# Patient Record
Sex: FEMALE | Race: WHITE | Employment: OTHER
[De-identification: names, ages, dates, MRNs, and addresses within clinical notes are randomized per-mention and may not be internally consistent; named-entity substitution may affect disease eponyms.]

---

## 2017-01-05 ENCOUNTER — OFFICE VISIT (OUTPATIENT)
Dept: ORTHOPEDIC SURGERY | Facility: CLINIC | Age: 62
End: 2017-01-05

## 2017-01-05 DIAGNOSIS — S82.142D TIBIAL PLATEAU FRACTURE, LEFT, CLOSED, WITH ROUTINE HEALING, SUBSEQUENT ENCOUNTER: Primary | ICD-10-CM

## 2017-01-05 PROCEDURE — 99024 POSTOP FOLLOW-UP VISIT: CPT | Performed by: ORTHOPAEDIC SURGERY

## 2017-01-11 ENCOUNTER — CARE COORDINATION (OUTPATIENT)
Dept: INTERNAL MEDICINE | Facility: CLINIC | Age: 62
End: 2017-01-11

## 2017-01-16 ENCOUNTER — CARE COORDINATION (OUTPATIENT)
Dept: CARE COORDINATION | Facility: CLINIC | Age: 62
End: 2017-01-16

## 2017-01-16 RX ORDER — TRAMADOL HYDROCHLORIDE 50 MG/1
50 TABLET ORAL EVERY 6 HOURS PRN
COMMUNITY
End: 2017-04-22 | Stop reason: SDUPTHER

## 2017-01-16 RX ORDER — CHOLECALCIFEROL (VITAMIN D3) 1250 MCG
1 CAPSULE ORAL WEEKLY
COMMUNITY
End: 2017-08-21 | Stop reason: ALTCHOICE

## 2017-01-16 RX ORDER — SENNA PLUS 8.6 MG/1
2 TABLET ORAL DAILY
COMMUNITY
End: 2018-01-19 | Stop reason: ALTCHOICE

## 2017-01-23 ENCOUNTER — OFFICE VISIT (OUTPATIENT)
Dept: INTERNAL MEDICINE | Facility: CLINIC | Age: 62
End: 2017-01-23

## 2017-01-23 VITALS
WEIGHT: 164 LBS | HEIGHT: 61 IN | DIASTOLIC BLOOD PRESSURE: 68 MMHG | RESPIRATION RATE: 14 BRPM | BODY MASS INDEX: 30.96 KG/M2 | SYSTOLIC BLOOD PRESSURE: 132 MMHG

## 2017-01-23 DIAGNOSIS — S50.02XA LEFT ELBOW CONTUSION: ICD-10-CM

## 2017-01-23 DIAGNOSIS — S82.142A TIBIAL PLATEAU FRACTURE, LEFT, CLOSED, INITIAL ENCOUNTER: Primary | ICD-10-CM

## 2017-01-23 DIAGNOSIS — Z12.9 CANCER SCREENING: ICD-10-CM

## 2017-01-23 DIAGNOSIS — J43.1 PANLOBULAR EMPHYSEMA (HCC): ICD-10-CM

## 2017-01-23 DIAGNOSIS — F31.9 BIPOLAR DISORDER WITHOUT PSYCHOTIC FEATURES (HCC): ICD-10-CM

## 2017-01-23 PROCEDURE — G8419 CALC BMI OUT NRM PARAM NOF/U: HCPCS | Performed by: INTERNAL MEDICINE

## 2017-01-23 PROCEDURE — G8926 SPIRO NO PERF OR DOC: HCPCS | Performed by: INTERNAL MEDICINE

## 2017-01-23 PROCEDURE — 3014F SCREEN MAMMO DOC REV: CPT | Performed by: INTERNAL MEDICINE

## 2017-01-23 PROCEDURE — 3017F COLORECTAL CA SCREEN DOC REV: CPT | Performed by: INTERNAL MEDICINE

## 2017-01-23 PROCEDURE — 99213 OFFICE O/P EST LOW 20 MIN: CPT | Performed by: INTERNAL MEDICINE

## 2017-01-23 PROCEDURE — 4004F PT TOBACCO SCREEN RCVD TLK: CPT | Performed by: INTERNAL MEDICINE

## 2017-01-23 PROCEDURE — G8484 FLU IMMUNIZE NO ADMIN: HCPCS | Performed by: INTERNAL MEDICINE

## 2017-01-23 PROCEDURE — 3023F SPIROM DOC REV: CPT | Performed by: INTERNAL MEDICINE

## 2017-01-23 PROCEDURE — G8427 DOCREV CUR MEDS BY ELIG CLIN: HCPCS | Performed by: INTERNAL MEDICINE

## 2017-01-23 RX ORDER — SIMVASTATIN 40 MG
TABLET ORAL
Qty: 90 TABLET | Refills: 3 | Status: SHIPPED | OUTPATIENT
Start: 2017-01-23 | End: 2018-03-07 | Stop reason: SDUPTHER

## 2017-01-23 RX ORDER — DOCUSATE SODIUM 100 MG/1
100 CAPSULE, LIQUID FILLED ORAL 2 TIMES DAILY
Qty: 60 CAPSULE | Refills: 3 | Status: SHIPPED | OUTPATIENT
Start: 2017-01-23 | End: 2017-08-21

## 2017-01-23 RX ORDER — ESCITALOPRAM OXALATE 10 MG/1
10 TABLET ORAL DAILY
Qty: 30 TABLET | Refills: 3 | Status: ON HOLD | OUTPATIENT
Start: 2017-01-23 | End: 2019-01-01 | Stop reason: HOSPADM

## 2017-01-23 RX ORDER — TRAMADOL HYDROCHLORIDE 50 MG/1
50 TABLET ORAL EVERY 6 HOURS PRN
Qty: 30 TABLET | Refills: 0 | Status: SHIPPED | OUTPATIENT
Start: 2017-01-23 | End: 2017-01-30

## 2017-01-23 RX ORDER — ESCITALOPRAM OXALATE 20 MG/1
20 TABLET ORAL DAILY
Qty: 30 TABLET | Refills: 3 | Status: ON HOLD | OUTPATIENT
Start: 2017-01-23 | End: 2019-01-01 | Stop reason: HOSPADM

## 2017-01-23 ASSESSMENT — ENCOUNTER SYMPTOMS
ABDOMINAL PAIN: 0
ALLERGIC/IMMUNOLOGIC NEGATIVE: 1
EYES NEGATIVE: 1
RESPIRATORY NEGATIVE: 1

## 2017-02-02 ENCOUNTER — OFFICE VISIT (OUTPATIENT)
Dept: ORTHOPEDIC SURGERY | Facility: CLINIC | Age: 62
End: 2017-02-02

## 2017-02-02 VITALS — HEIGHT: 61 IN | BODY MASS INDEX: 31.34 KG/M2 | WEIGHT: 166 LBS

## 2017-02-02 DIAGNOSIS — S82.142D TIBIAL PLATEAU FRACTURE, LEFT, CLOSED, WITH ROUTINE HEALING, SUBSEQUENT ENCOUNTER: Primary | ICD-10-CM

## 2017-02-02 PROCEDURE — 99024 POSTOP FOLLOW-UP VISIT: CPT | Performed by: ORTHOPAEDIC SURGERY

## 2017-04-03 ENCOUNTER — CARE COORDINATION (OUTPATIENT)
Dept: CARE COORDINATION | Age: 62
End: 2017-04-03

## 2017-04-03 DIAGNOSIS — E55.9 VITAMIN D DEFICIENCY: Primary | ICD-10-CM

## 2017-04-04 RX ORDER — CHOLECALCIFEROL (VITAMIN D3) 1250 MCG
1 CAPSULE ORAL WEEKLY
Qty: 1 CAPSULE | Status: CANCELLED | OUTPATIENT
Start: 2017-04-04

## 2017-04-04 RX ORDER — B-COMPLEX WITH VITAMIN C
1 TABLET ORAL DAILY
Qty: 90 TABLET | Refills: 3 | Status: SHIPPED | OUTPATIENT
Start: 2017-04-04 | End: 2018-01-19 | Stop reason: ALTCHOICE

## 2017-04-24 RX ORDER — TRAMADOL HYDROCHLORIDE 50 MG/1
TABLET ORAL
Qty: 30 TABLET | Refills: 0 | Status: SHIPPED | OUTPATIENT
Start: 2017-04-24 | End: 2017-06-19 | Stop reason: SDUPTHER

## 2017-05-19 RX ORDER — NICOTINE 21 MG/24HR
1 PATCH, TRANSDERMAL 24 HOURS TRANSDERMAL EVERY 24 HOURS
Qty: 30 PATCH | Refills: 0 | Status: SHIPPED | OUTPATIENT
Start: 2017-05-19 | End: 2017-08-01 | Stop reason: SDUPTHER

## 2017-06-05 RX ORDER — MIDODRINE HYDROCHLORIDE 10 MG/1
TABLET ORAL
Qty: 180 TABLET | Refills: 0 | Status: SHIPPED | OUTPATIENT
Start: 2017-06-05 | End: 2017-09-08 | Stop reason: SDUPTHER

## 2017-06-15 ENCOUNTER — CARE COORDINATION (OUTPATIENT)
Dept: CARE COORDINATION | Age: 62
End: 2017-06-15

## 2017-06-15 RX ORDER — LORAZEPAM 1 MG/1
0.5 TABLET ORAL 2 TIMES DAILY PRN
Refills: 1 | COMMUNITY
Start: 2017-04-22 | End: 2019-01-01 | Stop reason: ALTCHOICE

## 2017-06-19 ENCOUNTER — OFFICE VISIT (OUTPATIENT)
Dept: INTERNAL MEDICINE CLINIC | Age: 62
End: 2017-06-19
Payer: COMMERCIAL

## 2017-06-19 VITALS
HEIGHT: 61 IN | DIASTOLIC BLOOD PRESSURE: 68 MMHG | BODY MASS INDEX: 33.61 KG/M2 | RESPIRATION RATE: 14 BRPM | SYSTOLIC BLOOD PRESSURE: 110 MMHG | WEIGHT: 178 LBS

## 2017-06-19 DIAGNOSIS — M26.609 TEMPOROMANDIBULAR JOINT DISORDER: ICD-10-CM

## 2017-06-19 DIAGNOSIS — F09 MILD COGNITIVE DISORDER: ICD-10-CM

## 2017-06-19 DIAGNOSIS — F31.70 BIPOLAR DISORDER IN FULL REMISSION, MOST RECENT EPISODE UNSPECIFIED TYPE (HCC): ICD-10-CM

## 2017-06-19 DIAGNOSIS — I95.0 IDIOPATHIC HYPOTENSION: Primary | Chronic | ICD-10-CM

## 2017-06-19 PROCEDURE — 3014F SCREEN MAMMO DOC REV: CPT | Performed by: INTERNAL MEDICINE

## 2017-06-19 PROCEDURE — 4004F PT TOBACCO SCREEN RCVD TLK: CPT | Performed by: INTERNAL MEDICINE

## 2017-06-19 PROCEDURE — G8427 DOCREV CUR MEDS BY ELIG CLIN: HCPCS | Performed by: INTERNAL MEDICINE

## 2017-06-19 PROCEDURE — G8419 CALC BMI OUT NRM PARAM NOF/U: HCPCS | Performed by: INTERNAL MEDICINE

## 2017-06-19 PROCEDURE — 99214 OFFICE O/P EST MOD 30 MIN: CPT | Performed by: INTERNAL MEDICINE

## 2017-06-19 PROCEDURE — 3017F COLORECTAL CA SCREEN DOC REV: CPT | Performed by: INTERNAL MEDICINE

## 2017-06-19 RX ORDER — TRAMADOL HYDROCHLORIDE 50 MG/1
TABLET ORAL
Qty: 30 TABLET | Refills: 0 | Status: SHIPPED | OUTPATIENT
Start: 2017-06-19 | End: 2017-07-18 | Stop reason: SDUPTHER

## 2017-06-19 ASSESSMENT — PATIENT HEALTH QUESTIONNAIRE - PHQ9
1. LITTLE INTEREST OR PLEASURE IN DOING THINGS: 1
SUM OF ALL RESPONSES TO PHQ QUESTIONS 1-9: 2
2. FEELING DOWN, DEPRESSED OR HOPELESS: 1
SUM OF ALL RESPONSES TO PHQ9 QUESTIONS 1 & 2: 2

## 2017-06-19 ASSESSMENT — ENCOUNTER SYMPTOMS
ABDOMINAL PAIN: 0
DIFFICULTY BREATHING: 1
NAUSEA: 0

## 2017-06-19 ASSESSMENT — COPD QUESTIONNAIRES: COPD: 1

## 2017-07-19 RX ORDER — TRAMADOL HYDROCHLORIDE 50 MG/1
TABLET ORAL
Qty: 30 TABLET | Refills: 0 | Status: SHIPPED | OUTPATIENT
Start: 2017-07-19 | End: 2017-09-19 | Stop reason: SDUPTHER

## 2017-08-21 ENCOUNTER — CARE COORDINATION (OUTPATIENT)
Dept: CARE COORDINATION | Age: 62
End: 2017-08-21

## 2017-08-25 ENCOUNTER — OFFICE VISIT (OUTPATIENT)
Dept: PSYCHOLOGY | Age: 62
End: 2017-08-25
Payer: COMMERCIAL

## 2017-08-25 DIAGNOSIS — F33.1 DEPRESSION, MAJOR, RECURRENT, MODERATE (HCC): ICD-10-CM

## 2017-08-25 DIAGNOSIS — F17.219 CIGARETTE NICOTINE DEPENDENCE WITH NICOTINE-INDUCED DISORDER: Primary | ICD-10-CM

## 2017-08-25 PROCEDURE — 90791 PSYCH DIAGNOSTIC EVALUATION: CPT | Performed by: SOCIAL WORKER

## 2017-08-25 ASSESSMENT — PATIENT HEALTH QUESTIONNAIRE - PHQ9
9. THOUGHTS THAT YOU WOULD BE BETTER OFF DEAD, OR OF HURTING YOURSELF: 0
4. FEELING TIRED OR HAVING LITTLE ENERGY: 3
1. LITTLE INTEREST OR PLEASURE IN DOING THINGS: 0
10. IF YOU CHECKED OFF ANY PROBLEMS, HOW DIFFICULT HAVE THESE PROBLEMS MADE IT FOR YOU TO DO YOUR WORK, TAKE CARE OF THINGS AT HOME, OR GET ALONG WITH OTHER PEOPLE: 0
2. FEELING DOWN, DEPRESSED OR HOPELESS: 3
3. TROUBLE FALLING OR STAYING ASLEEP: 0
8. MOVING OR SPEAKING SO SLOWLY THAT OTHER PEOPLE COULD HAVE NOTICED. OR THE OPPOSITE, BEING SO FIGETY OR RESTLESS THAT YOU HAVE BEEN MOVING AROUND A LOT MORE THAN USUAL: 2
7. TROUBLE CONCENTRATING ON THINGS, SUCH AS READING THE NEWSPAPER OR WATCHING TELEVISION: 3
5. POOR APPETITE OR OVEREATING: 3
SUM OF ALL RESPONSES TO PHQ QUESTIONS 1-9: 14
SUM OF ALL RESPONSES TO PHQ9 QUESTIONS 1 & 2: 3
6. FEELING BAD ABOUT YOURSELF - OR THAT YOU ARE A FAILURE OR HAVE LET YOURSELF OR YOUR FAMILY DOWN: 0

## 2017-09-08 RX ORDER — MIDODRINE HYDROCHLORIDE 10 MG/1
TABLET ORAL
Qty: 180 TABLET | Refills: 2 | Status: SHIPPED | OUTPATIENT
Start: 2017-09-08 | End: 2018-06-05 | Stop reason: SDUPTHER

## 2017-09-19 ENCOUNTER — OFFICE VISIT (OUTPATIENT)
Dept: INTERNAL MEDICINE CLINIC | Age: 62
End: 2017-09-19
Payer: COMMERCIAL

## 2017-09-19 VITALS
DIASTOLIC BLOOD PRESSURE: 60 MMHG | SYSTOLIC BLOOD PRESSURE: 112 MMHG | HEART RATE: 90 BPM | WEIGHT: 177 LBS | HEIGHT: 61 IN | BODY MASS INDEX: 33.42 KG/M2 | OXYGEN SATURATION: 96 %

## 2017-09-19 DIAGNOSIS — J43.1 PANLOBULAR EMPHYSEMA (HCC): ICD-10-CM

## 2017-09-19 DIAGNOSIS — M25.562 CHRONIC PAIN OF LEFT KNEE: ICD-10-CM

## 2017-09-19 DIAGNOSIS — Z23 NEED FOR VACCINATION: Primary | ICD-10-CM

## 2017-09-19 DIAGNOSIS — F17.200 SMOKER: ICD-10-CM

## 2017-09-19 DIAGNOSIS — G89.29 CHRONIC PAIN OF LEFT KNEE: ICD-10-CM

## 2017-09-19 DIAGNOSIS — Z00.00 ROUTINE HEALTH MAINTENANCE: ICD-10-CM

## 2017-09-19 PROCEDURE — 99214 OFFICE O/P EST MOD 30 MIN: CPT | Performed by: INTERNAL MEDICINE

## 2017-09-19 PROCEDURE — 90686 IIV4 VACC NO PRSV 0.5 ML IM: CPT | Performed by: INTERNAL MEDICINE

## 2017-09-19 PROCEDURE — G0008 ADMIN INFLUENZA VIRUS VAC: HCPCS | Performed by: INTERNAL MEDICINE

## 2017-09-19 RX ORDER — TRAMADOL HYDROCHLORIDE 50 MG/1
TABLET ORAL
Qty: 30 TABLET | Refills: 0 | Status: SHIPPED | OUTPATIENT
Start: 2017-09-19 | End: 2018-01-19 | Stop reason: SDUPTHER

## 2017-09-19 ASSESSMENT — ENCOUNTER SYMPTOMS
SHORTNESS OF BREATH: 1
EYE ITCHING: 0
WHEEZING: 1
COUGH: 0
COLOR CHANGE: 0
CONSTIPATION: 0
ABDOMINAL PAIN: 0
EYE REDNESS: 0
ABDOMINAL DISTENTION: 0
CHEST TIGHTNESS: 0
CHOKING: 0
BACK PAIN: 0
DIARRHEA: 0
APNEA: 0
BLOOD IN STOOL: 0
EYE DISCHARGE: 0
EYE PAIN: 0

## 2017-09-29 ENCOUNTER — CARE COORDINATION (OUTPATIENT)
Dept: CARE COORDINATION | Age: 62
End: 2017-09-29

## 2017-10-08 ENCOUNTER — HOSPITAL ENCOUNTER (EMERGENCY)
Age: 62
Discharge: HOME | End: 2017-10-08
Attending: EMERGENCY MEDICINE
Payer: COMMERCIAL

## 2017-10-08 VITALS
WEIGHT: 180 LBS | RESPIRATION RATE: 17 BRPM | OXYGEN SATURATION: 95 % | HEART RATE: 106 BPM | HEIGHT: 61 IN | BODY MASS INDEX: 33.99 KG/M2 | TEMPERATURE: 98 F | DIASTOLIC BLOOD PRESSURE: 56 MMHG | SYSTOLIC BLOOD PRESSURE: 85 MMHG

## 2017-10-08 DIAGNOSIS — B02.9 HERPES ZOSTER WITHOUT COMPLICATION: Primary | ICD-10-CM

## 2017-10-08 PROCEDURE — 6370000000 HC RX 637 (ALT 250 FOR IP): Performed by: EMERGENCY MEDICINE

## 2017-10-08 PROCEDURE — 99282 EMERGENCY DEPT VISIT SF MDM: CPT

## 2017-10-08 RX ORDER — HYDROCODONE BITARTRATE AND ACETAMINOPHEN 5; 325 MG/1; MG/1
1 TABLET ORAL EVERY 8 HOURS PRN
Qty: 12 TABLET | Refills: 0 | Status: SHIPPED | OUTPATIENT
Start: 2017-10-08 | End: 2017-10-11 | Stop reason: SDUPTHER

## 2017-10-08 RX ORDER — TETRACAINE HYDROCHLORIDE 5 MG/ML
1 SOLUTION OPHTHALMIC ONCE
Status: COMPLETED | OUTPATIENT
Start: 2017-10-08 | End: 2017-10-08

## 2017-10-08 RX ORDER — HYDROCODONE BITARTRATE AND ACETAMINOPHEN 5; 325 MG/1; MG/1
1 TABLET ORAL ONCE
Status: COMPLETED | OUTPATIENT
Start: 2017-10-08 | End: 2017-10-08

## 2017-10-08 RX ORDER — HYDROCODONE BITARTRATE AND ACETAMINOPHEN 5; 325 MG/1; MG/1
1 TABLET ORAL EVERY 8 HOURS PRN
Qty: 12 TABLET | Refills: 0 | Status: SHIPPED | OUTPATIENT
Start: 2017-10-08 | End: 2017-10-08

## 2017-10-08 RX ORDER — VALACYCLOVIR HYDROCHLORIDE 500 MG/1
1000 TABLET, FILM COATED ORAL 3 TIMES DAILY
Qty: 21 TABLET | Refills: 0 | Status: SHIPPED | OUTPATIENT
Start: 2017-10-08 | End: 2017-10-15

## 2017-10-08 RX ORDER — VALACYCLOVIR HYDROCHLORIDE 500 MG/1
1000 TABLET, FILM COATED ORAL ONCE
Status: COMPLETED | OUTPATIENT
Start: 2017-10-08 | End: 2017-10-08

## 2017-10-08 RX ORDER — IBUPROFEN 600 MG/1
600 TABLET ORAL EVERY 8 HOURS PRN
Qty: 30 TABLET | Refills: 0 | Status: SHIPPED | OUTPATIENT
Start: 2017-10-08 | End: 2019-01-01

## 2017-10-08 RX ADMIN — HYDROCODONE BITARTRATE AND ACETAMINOPHEN 1 TABLET: 5; 325 TABLET ORAL at 18:10

## 2017-10-08 RX ADMIN — VALACYCLOVIR 1000 MG: 500 TABLET, FILM COATED ORAL at 18:11

## 2017-10-08 RX ADMIN — TETRACAINE HYDROCHLORIDE 1 DROP: 5 SOLUTION OPHTHALMIC at 19:12

## 2017-10-08 RX ADMIN — FLUORESCEIN SODIUM 1 MG: 1 STRIP OPHTHALMIC at 19:14

## 2017-10-08 ASSESSMENT — PAIN DESCRIPTION - ORIENTATION: ORIENTATION: RIGHT

## 2017-10-08 ASSESSMENT — PAIN SCALES - GENERAL
PAINLEVEL_OUTOF10: 9
PAINLEVEL_OUTOF10: 8

## 2017-10-08 ASSESSMENT — PAIN DESCRIPTION - LOCATION: LOCATION: EAR;EYE

## 2017-10-08 NOTE — ED PROVIDER NOTES
[address]  eMERGENCY dEPARTMENT eNCOUnter    Pt Name: Espinoza De León  MRN: [id number]  Birthdate 1955  Date of evaluation: 10/8/17  CHIEF COMPLAINT       Chief Complaint   Patient presents with    Rash     HISTORY OF PRESENT ILLNESS   HPI  41-year-old female past medical history as listed below presents to ER with right forehead, parietal and R ear pain. Patient states the pain started approximately 2 days ago prior to the rash. Rash is vesicular in appearance and very painful. Patient denies fevers, decreased hearing, changes in vision or any other complaints at this time  REVIEW OF SYSTEMS     Review of Systems   All other systems reviewed and are negative.     PAST MEDICAL HISTORY     Past Medical History:   Diagnosis Date    Atypical depressive disorder     Hypotension, unspecified     Mitral valve disorders     Obstructive chronic bronchitis with exacerbation (HCC)     Other and unspecified hyperlipidemia     Other malaise and fatigue     Temporomandibular joint disorders, unspecified     Unspecified breast disorder     Unspecified vitamin D deficiency      SURGICAL HISTORY       Past Surgical History:   Procedure Laterality Date    IRIS AND BSO  1995     CURRENT MEDICATIONS       Discharge Medication List as of 10/8/2017  6:04 PM      CONTINUE these medications which have NOT CHANGED    Details   NICOTINE STEP 1 21 MG/24HR UNWRAP AND APPLY 1 PATCH TO SKIN EVERY 24 HOURS, Disp-28 patch, R-3Normal      traMADol (ULTRAM) 50 MG tablet TAKE 1 TABLET BY MOUTH EVERY 6 HOURS AS NEEDED FOR PAIN, Disp-30 tablet, R-0Print      midodrine (PROAMATINE) 10 MG tablet TAKE 1 TABLET BY MOUTH TWICE DAILY, Disp-180 tablet, R-2Normal      LORazepam (ATIVAN) 1 MG tablet Take 1 mg by mouth daily as needed, R-1Historical Med      B Complex Vitamins (VITAMIN B COMPLEX) TABS Take 1 tablet by mouth daily, Disp-90 tablet, R-3Normal      !! escitalopram (LEXAPRO) 10 MG tablet Take 1 tablet by mouth daily Indications: (Pt takes a 20 mg + a 10 mg = 30 mg total per day), Disp-30 tablet, R-3      simvastatin (ZOCOR) 40 MG tablet TAKE 1 TABLET BY MOUTH EVERY NIGHT AT BEDTIME, Disp-90 tablet, R-3      !! escitalopram (LEXAPRO) 20 MG tablet Take 1 tablet by mouth daily Indications: (Pt takes a 20 mg + a 10 mg = 30 mg total per day), Disp-30 tablet, R-3      senna (SENOKOT) 8.6 MG tablet Take 2 tablets by mouth daily      QUEtiapine (SEROQUEL) 100 MG tablet Take 100-200 mg by mouth nightly      QUEtiapine (SEROQUEL XR) 300 MG extended release tablet Take 300 mg by mouth daily      budesonide-formoterol (SYMBICORT) 160-4.5 MCG/ACT AERO Inhale 2 puffs into the lungs 2 times daily, Disp-1 Inhaler, R-2      COMBIVENT RESPIMAT  MCG/ACT AERS inhaler INHALE 1 PUFF INTO THE LUNGS FOUR TIMES DAILY, Disp-4 g, R-5       !! - Potential duplicate medications found. Please discuss with provider. ALLERGIES     is allergic to vesicare [solifenacin]. FAMILY HISTORY     has no family status information on file. SOCIAL HISTORY      reports that she has been smoking Cigarettes. She has a 10.50 pack-year smoking history. She has never used smokeless tobacco. She reports that she does not drink alcohol or use drugs. PHYSICAL EXAM     INITIAL VITALS: BP (!) 85/56   Pulse 106   Temp 98 °F (36.7 °C)   Resp 17   Ht 5' 1\" (1.549 m)   Wt 180 lb (81.6 kg)   SpO2 95%   BMI 34.01 kg/m²    Physical Exam   Constitutional: She is oriented to person, place, and time. She appears well-developed and well-nourished. No distress. HENT:   Head: Normocephalic and atraumatic. Right Ear: External ear normal.   Nose: Nose normal.   Mouth/Throat: Oropharynx is clear and moist.   Vesicular rash noted over right forehead and right parietal area. No nasal or ocular involvement. Eyes: Conjunctivae and EOM are normal. Pupils are equal, round, and reactive to light. Neck: Normal range of motion. Neck supple.    Cardiovascular: Normal rate, regular fluorescein ophthalmic strip 1 mg    valACYclovir (VALTREX) tablet 1,000 mg    valACYclovir (VALTREX) 500 MG tablet     Sig: Take 2 tablets by mouth 3 times daily for 7 days     Dispense:  21 tablet     Refill:  0    DISCONTD: HYDROcodone-acetaminophen (NORCO) 5-325 MG per tablet     Sig: Take 1 tablet by mouth every 8 hours as needed for Pain . Dispense:  12 tablet     Refill:  0    ibuprofen (ADVIL;MOTRIN) 600 MG tablet     Sig: Take 1 tablet by mouth every 8 hours as needed for Pain     Dispense:  30 tablet     Refill:  0    HYDROcodone-acetaminophen (NORCO) 5-325 MG per tablet     Sig: Take 1 tablet by mouth every 8 hours as needed for Pain . Dispense:  12 tablet     Refill:  0     CONSULTS:  None    FINAL IMPRESSION      1. Herpes zoster without complication          DISPOSITION/PLAN   DISPOSITION Decision to Discharge    PATIENT REFERRED TO:  No follow-up provider specified.   DISCHARGE MEDICATIONS:  Discharge Medication List as of 10/8/2017  6:04 PM      START taking these medications    Details   valACYclovir (VALTREX) 500 MG tablet Take 2 tablets by mouth 3 times daily for 7 days, Disp-21 tablet, R-0Print      ibuprofen (ADVIL;MOTRIN) 600 MG tablet Take 1 tablet by mouth every 8 hours as needed for Pain, Disp-30 tablet, R-0Print      HYDROcodone-acetaminophen (NORCO) 5-325 MG per tablet Take 1 tablet by mouth every 8 hours as needed for Pain ., Disp-12 tablet, R-0Print           Yifan Osorio MD  Attending Emergency Physician                      Yifan Osorio MD  10/08/17 [id number]

## 2017-10-11 ENCOUNTER — OFFICE VISIT (OUTPATIENT)
Dept: INTERNAL MEDICINE CLINIC | Age: 62
End: 2017-10-11
Payer: COMMERCIAL

## 2017-10-11 ENCOUNTER — CARE COORDINATION (OUTPATIENT)
Dept: CARE COORDINATION | Age: 62
End: 2017-10-11

## 2017-10-11 VITALS
BODY MASS INDEX: 32.47 KG/M2 | HEART RATE: 111 BPM | DIASTOLIC BLOOD PRESSURE: 68 MMHG | OXYGEN SATURATION: 96 % | SYSTOLIC BLOOD PRESSURE: 110 MMHG | HEIGHT: 61 IN | WEIGHT: 172 LBS

## 2017-10-11 DIAGNOSIS — B02.9 HERPES ZOSTER WITHOUT COMPLICATION: ICD-10-CM

## 2017-10-11 DIAGNOSIS — Z13.220 SCREENING FOR HYPERLIPIDEMIA: ICD-10-CM

## 2017-10-11 DIAGNOSIS — Z12.11 SCREENING FOR COLON CANCER: ICD-10-CM

## 2017-10-11 PROCEDURE — 99213 OFFICE O/P EST LOW 20 MIN: CPT | Performed by: NURSE PRACTITIONER

## 2017-10-11 RX ORDER — PREDNISONE 20 MG/1
20 TABLET ORAL 2 TIMES DAILY
Qty: 10 TABLET | Refills: 0 | Status: SHIPPED | OUTPATIENT
Start: 2017-10-11 | End: 2017-10-16

## 2017-10-11 RX ORDER — HYDROCODONE BITARTRATE AND ACETAMINOPHEN 5; 325 MG/1; MG/1
1 TABLET ORAL EVERY 8 HOURS PRN
Qty: 12 TABLET | Refills: 0 | Status: SHIPPED | OUTPATIENT
Start: 2017-10-11 | End: 2017-10-15

## 2017-10-11 NOTE — PROGRESS NOTES
Visit Information    Have you changed or started any medications since your last visit including any over-the-counter medicines, vitamins, or herbal medicines? no   Are you having any side effects from any of your medications? -  no  Have you stopped taking any of your medications? Is so, why? -  no    Have you seen any other physician or provider since your last visit? No  Have you had any other diagnostic tests since your last visit? No  Have you been seen in the emergency room and/or had an admission to a hospital since we last saw you? No  Have you had your routine dental cleaning in the past 6 months? no    Have you activated your [employer] account? If not, what are your barriers? No: declined      Patient Care Team:  Pepe Bryan MD as PCP - General (Internal Medicine)  Luis Carlos Vazquez MD as PCP - S Attributed Provider  Erin Guardado RN as Care Coordinator  Yovany Dill MD as Consulting Physician (Orthopedic Surgery)  Svetlana Jacobson MD as Consulting Physician (Urology)    Medical History Review  Past Medical, Family, and Social History reviewed and does not contribute to the patient presenting condition    Health Maintenance   Topic Date Due    Hepatitis C screen  1955    HIV screen  09/02/1970    DTaP/Tdap/Td vaccine (1 - Tdap) 09/02/1974    Colon Cancer Screen FIT/FOBT  06/05/2013    Zostavax vaccine  09/02/2015    Lipid screen  05/17/2017    Diabetes screen  01/05/2019    Breast cancer screen  01/25/2019    Flu vaccine  Completed    Pneumococcal med risk  Completed       [address] [address]  Dept: [phone]  Dept Fax: [phone]    Annie Smith is a 58 y.o. female who presents today for her medical conditions/complaints as noted below.   Annie Smith is c/o of Rash (went to ER was Dx with shingles is still in a lot of pain,  )        HPI:     Patient presents with:  Rash: went to ER was Dx with shingles is still in a

## 2017-10-11 NOTE — CARE COORDINATION
RNCC met with patient her for f/u ED visit on 10/8/17 with shingles on her right forehead and eye. She was started on pain med, valtrex and ibuprofen. She states she is still having a lot of discomfort and eye pain. She was seen by CNP and started on prednisone and referral to eye dr today - asap d/t closeness of lesions to eye. Return for f/u in 2 weeks.      Future Appointments  Date Time Provider Caitlyn Rocio   10/25/2017 2:30 PM Karolina Oquendo NP [facility]   10/30/2017 1:00  [facility]  [facility]   1/19/2018 1:30 PM Semaj Escoto, RN  Ambulatory Care Coordinator

## 2017-10-25 ENCOUNTER — HOSPITAL ENCOUNTER (OUTPATIENT)
Age: 62
Setting detail: SPECIMEN
Discharge: HOME | End: 2017-10-25
Payer: COMMERCIAL

## 2017-10-25 ENCOUNTER — OFFICE VISIT (OUTPATIENT)
Dept: INTERNAL MEDICINE CLINIC | Age: 62
End: 2017-10-25
Payer: COMMERCIAL

## 2017-10-25 VITALS
WEIGHT: 171.96 LBS | DIASTOLIC BLOOD PRESSURE: 76 MMHG | BODY MASS INDEX: 32.47 KG/M2 | HEIGHT: 61 IN | SYSTOLIC BLOOD PRESSURE: 122 MMHG

## 2017-10-25 DIAGNOSIS — B02.9 HERPES ZOSTER WITHOUT COMPLICATION: Primary | ICD-10-CM

## 2017-10-25 DIAGNOSIS — R35.0 URINARY FREQUENCY: ICD-10-CM

## 2017-10-25 LAB
-: NORMAL
AMORPHOUS: NORMAL
BACTERIA: NORMAL
BILIRUBIN URINE: NEGATIVE
CASTS UA: NORMAL /LPF (ref 0–8)
COLOR: YELLOW
COMMENT UA: ABNORMAL
CRYSTALS, UA: NORMAL /HPF
EPITHELIAL CELLS UA: NORMAL /HPF (ref 0–5)
GLUCOSE URINE: NEGATIVE
KETONES, URINE: NEGATIVE
LEUKOCYTE ESTERASE, URINE: ABNORMAL
MUCUS: NORMAL
NITRITE, URINE: NEGATIVE
OTHER OBSERVATIONS UA: NORMAL
PH UA: 5 (ref 5–8)
PROTEIN UA: NEGATIVE
RBC UA: NORMAL /HPF (ref 0–4)
RENAL EPITHELIAL, UA: NORMAL /HPF
SPECIFIC GRAVITY UA: 1 (ref 1–1.03)
TRICHOMONAS: NORMAL
TURBIDITY: CLEAR
URINE HGB: NEGATIVE
UROBILINOGEN, URINE: NORMAL
WBC UA: NORMAL /HPF (ref 0–5)
YEAST: NORMAL

## 2017-10-25 PROCEDURE — 99213 OFFICE O/P EST LOW 20 MIN: CPT | Performed by: NURSE PRACTITIONER

## 2017-10-25 RX ORDER — TRAMADOL HYDROCHLORIDE 50 MG/1
TABLET ORAL
Qty: 30 TABLET | Refills: 0 | Status: CANCELLED | OUTPATIENT
Start: 2017-10-25

## 2017-10-25 ASSESSMENT — ENCOUNTER SYMPTOMS
COUGH: 0
ABDOMINAL PAIN: 0
SHORTNESS OF BREATH: 0
EYE DISCHARGE: 0
BLOOD IN STOOL: 0
RHINORRHEA: 0
SINUS PRESSURE: 0
WHEEZING: 0

## 2017-10-25 NOTE — PROGRESS NOTES
Take 1 mg by mouth daily as needed  1    B Complex Vitamins (VITAMIN B COMPLEX) TABS Take 1 tablet by mouth daily 90 tablet 3    escitalopram (LEXAPRO) 10 MG tablet Take 1 tablet by mouth daily Indications: (Pt takes a 20 mg + a 10 mg = 30 mg total per day) 30 tablet 3    simvastatin (ZOCOR) 40 MG tablet TAKE 1 TABLET BY MOUTH EVERY NIGHT AT BEDTIME 90 tablet 3    escitalopram (LEXAPRO) 20 MG tablet Take 1 tablet by mouth daily Indications: (Pt takes a 20 mg + a 10 mg = 30 mg total per day) 30 tablet 3    senna (SENOKOT) 8.6 MG tablet Take 2 tablets by mouth daily      QUEtiapine (SEROQUEL) 100 MG tablet Take 100-200 mg by mouth nightly      QUEtiapine (SEROQUEL XR) 300 MG extended release tablet Take 300 mg by mouth daily      budesonide-formoterol (SYMBICORT) 160-4.5 MCG/ACT AERO Inhale 2 puffs into the lungs 2 times daily 1 Inhaler 2    COMBIVENT RESPIMAT  MCG/ACT AERS inhaler INHALE 1 PUFF INTO THE LUNGS FOUR TIMES DAILY (Patient taking differently: INHALE 1 PUFF INTO THE LUNGS FOUR TIMES DAILY as needed) 4 g 5     No current facility-administered medications for this visit. Allergies   Allergen Reactions    Vesicare [Solifenacin] Nausea And Vomiting         Subjective:      Review of Systems   Constitutional: Negative for activity change, chills, fatigue and fever. Overall weakness    HENT: Negative for congestion, postnasal drip, rhinorrhea and sinus pressure. Eyes: Negative for discharge and visual disturbance. Respiratory: Negative for cough, shortness of breath and wheezing. Cardiovascular: Negative for chest pain and leg swelling. Gastrointestinal: Negative for abdominal pain and blood in stool. Endocrine: Negative for cold intolerance and heat intolerance. Genitourinary: Positive for decreased urine volume, dysuria, frequency and urgency. Negative for flank pain, hematuria and pelvic pain. Odor   Musculoskeletal: Negative for joint swelling and myalgias. involvemetn per optom    2. freq UA sent today  UA W/REFLEX CULTURE       Marlebronn Solders received counseling on Urinary frequency the following healthy behaviors: medication adherence  Reviewed prior labs and health maintenance  Continue current medications, diet and exercise. Discussed use, benefit, and side effects of prescribed medications. Barriers to medication compliance addressed. Patient given educational materials - see patient instructions  Was a self-tracking handout given in paper form or via [employer]? Yes    Requested Prescriptions      No prescriptions requested or ordered in this encounter       All patient questions answered. Patient voiced understanding. Quality Measures    Body mass index is 32.51 kg/m². Elevated. Weight control planned discussed Healthy diet and regular exercise. BP: 122/76 Blood pressure is normal. Treatment plan consists of No treatment change needed. Lab Results   Component Value Date    LDLCHOLESTEROL 78 05/17/2012    (goal LDL reduction with dx if diabetes is 50% LDL reduction)      PHQ Scores 8/25/2017 6/19/2017 8/1/2016 5/4/2016   PHQ2 Score 3 2 0 0   PHQ9 Score 14 2 0 0     Interpretation of Total Score Depression Severity: 1-4 = Minimal depression, 5-9 = Mild depression, 10-14 = Moderate depression, 15-19 = Moderately severe depression, 20-27 = Severe depression    Return if symptoms worsen or fail to improve. Orders Placed This Encounter   Procedures    UA W/REFLEX CULTURE     Standing Status:   Future     Standing Expiration Date:   10/26/2018     No orders of the defined types were placed in this encounter. Patient given verbal and/or written educational instructions. Follow up as directed. I have reviewed and agree with the nursing documentation.     Electronically signed by John Zee NP on 10/25/2017 at 2:39 PM

## 2017-10-26 LAB
CULTURE: NORMAL
CULTURE: NORMAL
Lab: NORMAL
SPECIMEN DESCRIPTION: NORMAL
STATUS: NORMAL

## 2017-10-30 ENCOUNTER — HOSPITAL ENCOUNTER (OUTPATIENT)
Age: 62
Discharge: HOME | End: 2017-10-30
Payer: COMMERCIAL

## 2017-10-30 ENCOUNTER — HOSPITAL ENCOUNTER (OUTPATIENT)
Dept: PULMONOLOGY | Age: 62
Discharge: HOME | End: 2017-10-30
Payer: COMMERCIAL

## 2017-10-30 DIAGNOSIS — Z00.00 ROUTINE HEALTH MAINTENANCE: ICD-10-CM

## 2017-10-30 DIAGNOSIS — J43.1 PANLOBULAR EMPHYSEMA (HCC): ICD-10-CM

## 2017-10-30 LAB
ANION GAP SERPL CALCULATED.3IONS-SCNC: 13 MMOL/L (ref 9–17)
BUN BLDV-MCNC: 9 MG/DL (ref 8–23)
BUN/CREAT BLD: ABNORMAL (ref 9–20)
CALCIUM SERPL-MCNC: 8.9 MG/DL (ref 8.6–10.4)
CHLORIDE BLD-SCNC: 102 MMOL/L (ref 98–107)
CO2: 24 MMOL/L (ref 20–31)
CREAT SERPL-MCNC: 0.79 MG/DL (ref 0.5–0.9)
GFR AFRICAN AMERICAN: >60 ML/MIN
GFR NON-AFRICAN AMERICAN: >60 ML/MIN
GFR SERPL CREATININE-BSD FRML MDRD: ABNORMAL ML/MIN/{1.73_M2}
GFR SERPL CREATININE-BSD FRML MDRD: ABNORMAL ML/MIN/{1.73_M2}
GLUCOSE BLD-MCNC: 109 MG/DL (ref 70–99)
HCT VFR BLD CALC: 38.9 % (ref 36–46)
HEMOGLOBIN: 12.8 G/DL (ref 12–16)
MCH RBC QN AUTO: 29.1 PG (ref 26–34)
MCHC RBC AUTO-ENTMCNC: 32.8 G/DL (ref 31–37)
MCV RBC AUTO: 88.8 FL (ref 80–100)
PDW BLD-RTO: 16.8 % (ref 11.5–14.9)
PLATELET # BLD: 306 K/UL (ref 150–450)
PMV BLD AUTO: 7.3 FL (ref 6–12)
POTASSIUM SERPL-SCNC: 4.1 MMOL/L (ref 3.7–5.3)
RBC # BLD: 4.38 M/UL (ref 4–5.2)
SODIUM BLD-SCNC: 139 MMOL/L (ref 135–144)
WBC # BLD: 12.1 K/UL (ref 3.5–11)

## 2017-10-30 PROCEDURE — 94729 DIFFUSING CAPACITY: CPT

## 2017-10-30 PROCEDURE — 85027 COMPLETE CBC AUTOMATED: CPT

## 2017-10-30 PROCEDURE — 94728 AIRWY RESIST BY OSCILLOMETRY: CPT

## 2017-10-30 PROCEDURE — 94727 GAS DIL/WSHOT DETER LNG VOL: CPT

## 2017-10-30 PROCEDURE — 94726 PLETHYSMOGRAPHY LUNG VOLUMES: CPT

## 2017-10-30 PROCEDURE — 83036 HEMOGLOBIN GLYCOSYLATED A1C: CPT

## 2017-10-30 PROCEDURE — 36415 COLL VENOUS BLD VENIPUNCTURE: CPT

## 2017-10-30 PROCEDURE — 80048 BASIC METABOLIC PNL TOTAL CA: CPT

## 2017-10-30 PROCEDURE — 94060 EVALUATION OF WHEEZING: CPT

## 2017-10-30 PROCEDURE — 6370000000 HC RX 637 (ALT 250 FOR IP): Performed by: INTERNAL MEDICINE

## 2017-10-30 RX ORDER — IPRATROPIUM BROMIDE AND ALBUTEROL SULFATE 2.5; .5 MG/3ML; MG/3ML
1 SOLUTION RESPIRATORY (INHALATION) ONCE
Status: COMPLETED | OUTPATIENT
Start: 2017-10-30 | End: 2017-10-30

## 2017-10-30 RX ADMIN — IPRATROPIUM BROMIDE AND ALBUTEROL SULFATE 1 AMPULE: .5; 3 SOLUTION RESPIRATORY (INHALATION) at 14:01

## 2017-10-31 LAB
ESTIMATED AVERAGE GLUCOSE: 114 MG/DL
HBA1C MFR BLD: 5.6 % (ref 4–6)

## 2017-12-07 ENCOUNTER — CARE COORDINATION (OUTPATIENT)
Dept: CARE COORDINATION | Age: 62
End: 2017-12-07

## 2017-12-07 NOTE — CARE COORDINATION
Ambulatory Care Coordination Note  12/7/2017  CM Risk Score: 5  Dimas Mortality Risk Score:      ACC: Yuli Dhillon RN    Summary Note: RNCC called and spoke to patient who states she is doing good. States her shingles all cleared up. She denied feeling any increase in sob, chest pain . She staes she has all her medications. Reminder for next appt with pcp and that we do have same day appointments. RNCC will reach out to patient in 2-3 weeks for follow up. Patient encouraged to contact [address] [address] for any questions or concerns regarding his health care, medications, or treatment plan, patient verbalized understanding. RNCC instructed patient that any concerns or issues will be addressed with patient's PCP and any new orders or instruction will be relayed to patient via [address] or [employer] once received. [address] contact information given to patient. Care Coordination Interventions    Program Enrollment:  Complex Care  Referral from Primary Care Provider:  Yes  Suggested Interventions and Community Resources  No Identified Needs  Behavorial Health:  Completed (Comment: [employer])  Zone Management Tools:  Completed (Comment: mailed COPD zone form.)  Other Services or Interventions:  referral to Behavioral Therapist-Ekta Brooks         Goals Addressed             Most Recent     Patient Stated (pt-stated)   On track (12/7/2017)             Patient will try to stop smoking. Barriers: lack of motivation and lack of support  Plan for overcoming my barriers: enc her sister to not smoke inside the house.  nicotine patches at pharm and use.   Confidence: 5/10  Anticipated Goal Completion Date: 11/30/17       Reduce Falls    On track (12/7/2017)             I will reduce my risk of falls by the following: Remove rugs or use non slip rugs  Install grab bars in bathroom  Use walking aids like cane or walker    Barriers: lack of motivation  Plan for overcoming my barriers: get someone to install grab bars in bathroom. Confidence: 6/10  Anticipated Goal Completion Date: 11/30/17            Prior to Admission medications    Medication Sig Start Date End Date Taking?  Authorizing Provider   ibuprofen (ADVIL;MOTRIN) 600 MG tablet Take 1 tablet by mouth every 8 hours as needed for Pain 10/8/17  Yes Ash Spencer MD   NICOTINE STEP 1 21 MG/24HR UNWRAP AND APPLY 1 PATCH TO SKIN EVERY 24 HOURS 10/6/17  Yes Andie Montejo MD   traMADol (ULTRAM) 50 MG tablet TAKE 1 TABLET BY MOUTH EVERY 6 HOURS AS NEEDED FOR PAIN 9/19/17  Yes Yuni Garcia MD   midodrine (PROAMATINE) 10 MG tablet TAKE 1 TABLET BY MOUTH TWICE DAILY 9/8/17  Yes Yuni Garcia MD   LORazepam (ATIVAN) 1 MG tablet Take 1 mg by mouth daily as needed 4/22/17  Yes Historical Provider, MD   B Complex Vitamins (VITAMIN B COMPLEX) TABS Take 1 tablet by mouth daily 4/4/17  Yes Andie Montejo MD   escitalopram (LEXAPRO) 10 MG tablet Take 1 tablet by mouth daily Indications: (Pt takes a 20 mg + a 10 mg = 30 mg total per day) 1/23/17  Yes Andie Montejo MD   simvastatin (ZOCOR) 40 MG tablet TAKE 1 TABLET BY MOUTH EVERY NIGHT AT BEDTIME 1/23/17  Yes Andie Montejo MD   escitalopram (LEXAPRO) 20 MG tablet Take 1 tablet by mouth daily Indications: (Pt takes a 20 mg + a 10 mg = 30 mg total per day) 1/23/17  Yes Andie Montejo MD   senna (SENOKOT) 8.6 MG tablet Take 2 tablets by mouth daily   Yes Historical Provider, MD   QUEtiapine (SEROQUEL) 100 MG tablet Take 100-200 mg by mouth nightly 8/23/16  Yes Historical Provider, MD   QUEtiapine (SEROQUEL XR) 300 MG extended release tablet Take 300 mg by mouth daily   Yes Historical Provider, MD   budesonide-formoterol (SYMBICORT) 160-4.5 MCG/ACT AERO Inhale 2 puffs into the lungs 2 times daily 9/20/16  Yes Andie Montejo MD   COMBIVENT RESPIMAT  MCG/ACT AERS inhaler INHALE 1 PUFF INTO THE LUNGS FOUR TIMES DAILY  Patient taking differently: INHALE 1 PUFF INTO THE LUNGS FOUR TIMES DAILY as needed 8/31/16  Yes

## 2018-01-01 ENCOUNTER — CARE COORDINATION (OUTPATIENT)
Dept: CARE COORDINATION | Age: 63
End: 2018-01-01

## 2018-01-01 ENCOUNTER — TELEPHONE (OUTPATIENT)
Dept: INTERNAL MEDICINE CLINIC | Age: 63
End: 2018-01-01

## 2018-01-01 ENCOUNTER — HOSPITAL ENCOUNTER (OUTPATIENT)
Dept: CT IMAGING | Age: 63
Discharge: HOME | End: 2018-10-31
Payer: COMMERCIAL

## 2018-01-01 DIAGNOSIS — J43.1 PANLOBULAR EMPHYSEMA (HCC): ICD-10-CM

## 2018-01-01 DIAGNOSIS — Z87.891 PERSONAL HISTORY OF NICOTINE DEPENDENCE: ICD-10-CM

## 2018-01-01 DIAGNOSIS — J32.9 SINUSITIS, UNSPECIFIED CHRONICITY, UNSPECIFIED LOCATION: Primary | ICD-10-CM

## 2018-01-01 PROCEDURE — G0297 LDCT FOR LUNG CA SCREEN: HCPCS

## 2018-01-01 RX ORDER — BUDESONIDE AND FORMOTEROL FUMARATE DIHYDRATE 160; 4.5 UG/1; UG/1
AEROSOL RESPIRATORY (INHALATION)
Qty: 10.2 G | Refills: 6 | Status: SHIPPED | OUTPATIENT
Start: 2018-01-01 | End: 2019-01-01 | Stop reason: SDUPTHER

## 2018-01-01 RX ORDER — AZITHROMYCIN 250 MG/1
TABLET, FILM COATED ORAL
Qty: 1 PACKET | Refills: 0 | Status: SHIPPED | OUTPATIENT
Start: 2018-01-01 | End: 2018-01-01

## 2018-01-05 ENCOUNTER — CARE COORDINATION (OUTPATIENT)
Dept: CARE COORDINATION | Age: 63
End: 2018-01-05

## 2018-01-05 ASSESSMENT — ENCOUNTER SYMPTOMS: DYSPNEA ASSOCIATED WITH: EXERTION

## 2018-01-05 NOTE — CARE COORDINATION
tablet Take 1 tablet by mouth daily Indications: (Pt takes a 20 mg + a 10 mg = 30 mg total per day) 1/23/17   Juan José Cabrera MD   simvastatin (ZOCOR) 40 MG tablet TAKE 1 TABLET BY MOUTH EVERY NIGHT AT BEDTIME 1/23/17   Juan José Cabrera MD   escitalopram (LEXAPRO) 20 MG tablet Take 1 tablet by mouth daily Indications: (Pt takes a 20 mg + a 10 mg = 30 mg total per day) 1/23/17   Juan José Cabrera MD   senna (SENOKOT) 8.6 MG tablet Take 2 tablets by mouth daily    Historical Provider, MD   QUEtiapine (SEROQUEL) 100 MG tablet Take 100-200 mg by mouth nightly 8/23/16   Historical Provider, MD   QUEtiapine (SEROQUEL XR) 300 MG extended release tablet Take 300 mg by mouth daily    Historical Provider, MD   budesonide-formoterol (SYMBICORT) 160-4.5 MCG/ACT AERO Inhale 2 puffs into the lungs 2 times daily 9/20/16   Juan José Cabrera MD   COMBIVENT RESPIMAT  MCG/ACT AERS inhaler INHALE 1 PUFF INTO THE LUNGS FOUR TIMES DAILY  Patient taking differently: INHALE 1 PUFF INTO THE LUNGS FOUR TIMES DAILY as needed 8/31/16   Juan José Cabrera MD       Future Appointments  Date Time Provider Caitlyn Chan   1/19/2018 1:30 PM Ervin Smith MD [address] [place] [address]

## 2018-01-08 RX ORDER — BUDESONIDE AND FORMOTEROL FUMARATE DIHYDRATE 160; 4.5 UG/1; UG/1
AEROSOL RESPIRATORY (INHALATION)
Qty: 10.2 G | Refills: 0 | Status: SHIPPED | OUTPATIENT
Start: 2018-01-08 | End: 2018-03-26 | Stop reason: SDUPTHER

## 2018-01-19 ENCOUNTER — OFFICE VISIT (OUTPATIENT)
Dept: INTERNAL MEDICINE CLINIC | Age: 63
End: 2018-01-19
Payer: COMMERCIAL

## 2018-01-19 VITALS
WEIGHT: 187 LBS | SYSTOLIC BLOOD PRESSURE: 118 MMHG | BODY MASS INDEX: 35.3 KG/M2 | DIASTOLIC BLOOD PRESSURE: 70 MMHG | OXYGEN SATURATION: 97 % | HEART RATE: 91 BPM | HEIGHT: 61 IN

## 2018-01-19 DIAGNOSIS — Z12.39 SCREENING FOR BREAST CANCER: ICD-10-CM

## 2018-01-19 DIAGNOSIS — F31.70 BIPOLAR DISORDER IN FULL REMISSION, MOST RECENT EPISODE UNSPECIFIED TYPE (HCC): Primary | ICD-10-CM

## 2018-01-19 DIAGNOSIS — M25.562 CHRONIC PAIN OF LEFT KNEE: ICD-10-CM

## 2018-01-19 DIAGNOSIS — J43.1 PANLOBULAR EMPHYSEMA (HCC): ICD-10-CM

## 2018-01-19 DIAGNOSIS — Z12.11 SCREENING FOR COLON CANCER: ICD-10-CM

## 2018-01-19 DIAGNOSIS — J43.9 PULMONARY EMPHYSEMA, UNSPECIFIED EMPHYSEMA TYPE (HCC): ICD-10-CM

## 2018-01-19 DIAGNOSIS — G89.29 CHRONIC PAIN OF LEFT KNEE: ICD-10-CM

## 2018-01-19 PROCEDURE — 99214 OFFICE O/P EST MOD 30 MIN: CPT | Performed by: INTERNAL MEDICINE

## 2018-01-19 RX ORDER — NEOMYCIN SULFATE, POLYMYXIN B SULFATE AND DEXAMETHASONE 3.5; 10000; 1 MG/ML; [USP'U]/ML; MG/ML
SUSPENSION/ DROPS OPHTHALMIC
Refills: 0 | COMMUNITY
Start: 2018-01-11 | End: 2018-04-24 | Stop reason: ALTCHOICE

## 2018-01-19 RX ORDER — QUETIAPINE 200 MG/1
TABLET, FILM COATED, EXTENDED RELEASE ORAL
Refills: 0 | COMMUNITY
Start: 2018-01-16 | End: 2018-07-30 | Stop reason: SDUPTHER

## 2018-01-19 RX ORDER — TRAMADOL HYDROCHLORIDE 50 MG/1
TABLET ORAL
Qty: 30 TABLET | Refills: 0 | Status: SHIPPED | OUTPATIENT
Start: 2018-01-19 | End: 2018-02-19

## 2018-01-19 ASSESSMENT — ENCOUNTER SYMPTOMS
DIARRHEA: 0
COLOR CHANGE: 0
CONSTIPATION: 0
SHORTNESS OF BREATH: 1
CHOKING: 0
BLOOD IN STOOL: 0
EYE PAIN: 0
EYE DISCHARGE: 0
BACK PAIN: 0
APNEA: 0
EYE ITCHING: 0
ABDOMINAL PAIN: 0
EYE REDNESS: 0
ABDOMINAL DISTENTION: 0
CHEST TIGHTNESS: 0
COUGH: 0

## 2018-01-29 ENCOUNTER — HOSPITAL ENCOUNTER (OUTPATIENT)
Dept: WOMENS IMAGING | Age: 63
Discharge: HOME | End: 2018-01-29
Payer: COMMERCIAL

## 2018-01-29 DIAGNOSIS — Z12.39 SCREENING FOR BREAST CANCER: ICD-10-CM

## 2018-01-29 PROCEDURE — 77063 BREAST TOMOSYNTHESIS BI: CPT

## 2018-02-02 ENCOUNTER — TELEPHONE (OUTPATIENT)
Dept: INTERNAL MEDICINE CLINIC | Age: 63
End: 2018-02-02

## 2018-02-02 NOTE — TELEPHONE ENCOUNTER
Called patient regarding overdue FIT kit, she states she messed it up.   She will have someone pickup another kit

## 2018-03-01 ENCOUNTER — CARE COORDINATION (OUTPATIENT)
Dept: CARE COORDINATION | Age: 63
End: 2018-03-01

## 2018-03-08 RX ORDER — SIMVASTATIN 40 MG
TABLET ORAL
Qty: 90 TABLET | Refills: 0 | Status: SHIPPED | OUTPATIENT
Start: 2018-03-08 | End: 2019-01-01

## 2018-03-26 ENCOUNTER — TELEPHONE (OUTPATIENT)
Dept: INTERNAL MEDICINE CLINIC | Age: 63
End: 2018-03-26

## 2018-03-26 DIAGNOSIS — N39.0 URINARY TRACT INFECTION WITHOUT HEMATURIA, SITE UNSPECIFIED: Primary | ICD-10-CM

## 2018-03-26 RX ORDER — BUDESONIDE AND FORMOTEROL FUMARATE DIHYDRATE 160; 4.5 UG/1; UG/1
AEROSOL RESPIRATORY (INHALATION)
Qty: 10.2 G | Refills: 0 | Status: SHIPPED | OUTPATIENT
Start: 2018-03-26 | End: 2018-04-24 | Stop reason: SDUPTHER

## 2018-03-26 RX ORDER — CIPROFLOXACIN 500 MG/1
500 TABLET, FILM COATED ORAL 2 TIMES DAILY
Qty: 10 TABLET | Refills: 0 | OUTPATIENT
Start: 2018-03-26 | End: 2018-04-02

## 2018-03-26 NOTE — TELEPHONE ENCOUNTER
Sick Call    Jarrett Bejarano   1955   [id number]   Keanu Schumacher MD   Allergies   Allergen Reactions   Paige Jurado [Solifenacin] Nausea And Vomiting        Last Visit: 1/2018  Reason for No Same Day Appt: none avail    Complaint:c/o bladder pressure, and states that she is not able to void much urine. Wants an antibiotic.       Ph[facility]: [facility]

## 2018-03-26 NOTE — TELEPHONE ENCOUNTER
Medication called into pt pharmacy. Called pt and informed her medication was sent to pharmacy. Pt understood.

## 2018-03-29 ENCOUNTER — CARE COORDINATION (OUTPATIENT)
Dept: CARE COORDINATION | Age: 63
End: 2018-03-29

## 2018-03-29 NOTE — CARE COORDINATION
Ambulatory Care Coordination Note  3/29/2018  CM Risk Score: 5  Dimas Mortality Risk Score:      ACC: Ryder Arreola RN    Summary Note: Patient stated that she feels that she has improved, she is still having some pressure with urination. She has been on CIPRO for 2 days and will call if her symptoms do not resolve. She stated that she is feeling better and has no concerns at this time. COPD Assessment    Does the patient understand envrionmental exposure?:  Yes  Is the patient able to verbalize Rescue vs. Long Acting medications?:  Yes  Does the patient have a nebulizer?:  No  Does the patient use a space with inhaled medications?:  Yes     No patient-reported symptoms         Symptoms:                   Care Coordination Interventions    Program Enrollment:  Rising Risk  Referral from Primary Care Provider:  Yes  Suggested Interventions and Community Resources  No Identified Needs  Behavorial Health:  Completed (Comment: [facility])  Smoking Cessation:  Completed  Zone Management Tools:  Completed (Comment: mailed COPD zone form.)  Other Services or Interventions:  referral to Behavioral Therapist-Ekta Brooks         Goals Addressed             Most Recent     Reduce Falls    No change (3/29/2018)             I will reduce my risk of falls by the following: Remove rugs or use non slip rugs  Install grab bars in bathroom  Use walking aids like cane or walker    Barriers: lack of motivation  Plan for overcoming my barriers: get someone to install grab bars in bathroom. Confidence: 6/10  Anticipated Goal Completion Date: 11/30/17            Prior to Admission medications    Medication Sig Start Date End Date Taking?  Authorizing Provider   SYMBICORT 160-4.5 MCG/ACT AERO INHALE 2 PUFFS INTO THE LUNGS TWICE DAILY 3/26/18   Zoe Cooper MD   ciprofloxacin (CIPRO) 500 MG tablet Take 1 tablet by mouth 2 times daily for 7 days 3/26/18 4/2/18  Beatrice Dillard MD   simvastatin (ZOCOR) 40 MG tablet TAKE 1 TABLET BY MOUTH EVERY NIGHT AT BEDTIME 3/8/18   Keanu Schumacher MD   neomycin-polymyxin-dexameth (MAXITROL) 3.5-40814-4.1 ophthalmic suspension INSTILL 1 DROP INTO THE RIGHT EYE QID 1/11/18   Historical Provider, MD   QUEtiapine (SEROQUEL XR) 200 MG extended release tablet TK 1 T PO QD 1/16/18   Historical Provider, MD   albuterol sulfate (PROAIR RESPICLICK) 080 (90 Base) MCG/ACT aerosol powder inhalation Inhale 1 puff into the lungs every 4 hours as needed for Wheezing or Shortness of Breath    Historical Provider, MD   ibuprofen (ADVIL;MOTRIN) 600 MG tablet Take 1 tablet by mouth every 8 hours as needed for Pain 10/8/17   Rosibel Gallegos MD   NICOTINE STEP 1 21 MG/24HR UNWRAP AND APPLY 1 PATCH TO SKIN EVERY 24 HOURS 10/6/17   Joe Hendrickson MD   midodrine (PROAMATINE) 10 MG tablet TAKE 1 TABLET BY MOUTH TWICE DAILY 9/8/17   Keanu Schumacher MD   LORazepam (ATIVAN) 1 MG tablet Take 1 mg by mouth daily as needed 4/22/17   Historical Provider, MD   escitalopram (LEXAPRO) 10 MG tablet Take 1 tablet by mouth daily Indications: (Pt takes a 20 mg + a 10 mg = 30 mg total per day) 1/23/17   Joe Hendrickson MD   escitalopram (LEXAPRO) 20 MG tablet Take 1 tablet by mouth daily Indications: (Pt takes a 20 mg + a 10 mg = 30 mg total per day) 1/23/17   Joe Hendrickson MD   QUEtiapine (SEROQUEL) 100 MG tablet Take 100-200 mg by mouth nightly 8/23/16   Historical Provider, MD   COMBIVENT RESPIMAT  MCG/ACT AERS inhaler INHALE 1 PUFF INTO THE LUNGS FOUR TIMES DAILY  Patient taking differently: INHALE 1 PUFF INTO THE LUNGS FOUR TIMES DAILY as needed 8/31/16   Joe Hendrickson MD       Future Appointments  Date Time Provider Caitlyn Chan   4/24/2018 2:00 PM [facility]

## 2018-04-24 ENCOUNTER — OFFICE VISIT (OUTPATIENT)
Dept: INTERNAL MEDICINE CLINIC | Age: 63
End: 2018-04-24
Payer: COMMERCIAL

## 2018-04-24 ENCOUNTER — CARE COORDINATION (OUTPATIENT)
Dept: CARE COORDINATION | Age: 63
End: 2018-04-24

## 2018-04-24 VITALS
HEART RATE: 81 BPM | OXYGEN SATURATION: 94 % | DIASTOLIC BLOOD PRESSURE: 78 MMHG | RESPIRATION RATE: 13 BRPM | WEIGHT: 186.8 LBS | HEIGHT: 60 IN | SYSTOLIC BLOOD PRESSURE: 132 MMHG | BODY MASS INDEX: 36.67 KG/M2

## 2018-04-24 DIAGNOSIS — Z72.0 TOBACCO ABUSE: ICD-10-CM

## 2018-04-24 DIAGNOSIS — F32.89 ATYPICAL DEPRESSIVE DISORDER: ICD-10-CM

## 2018-04-24 DIAGNOSIS — M54.50 MIDLINE LOW BACK PAIN WITHOUT SCIATICA, UNSPECIFIED CHRONICITY: ICD-10-CM

## 2018-04-24 DIAGNOSIS — J43.1 PANLOBULAR EMPHYSEMA (HCC): Primary | ICD-10-CM

## 2018-04-24 PROCEDURE — 99214 OFFICE O/P EST MOD 30 MIN: CPT | Performed by: INTERNAL MEDICINE

## 2018-04-24 RX ORDER — NICOTINE 21 MG/24HR
1 PATCH, TRANSDERMAL 24 HOURS TRANSDERMAL EVERY 24 HOURS
Qty: 30 PATCH | Refills: 3 | Status: SHIPPED | OUTPATIENT
Start: 2018-04-24 | End: 2019-01-01

## 2018-04-24 RX ORDER — TRAMADOL HYDROCHLORIDE 50 MG/1
50 TABLET ORAL EVERY 6 HOURS PRN
COMMUNITY
End: 2018-07-30

## 2018-04-24 RX ORDER — CEPHALEXIN 500 MG/1
500 CAPSULE ORAL DAILY
Refills: 0 | COMMUNITY
Start: 2018-04-05 | End: 2018-07-30 | Stop reason: ALTCHOICE

## 2018-04-24 RX ORDER — TRAMADOL HYDROCHLORIDE 50 MG/1
50 TABLET ORAL EVERY 6 HOURS PRN
Qty: 20 TABLET | Refills: 0 | Status: SHIPPED | OUTPATIENT
Start: 2018-04-24 | End: 2018-04-29

## 2018-04-24 RX ORDER — BUDESONIDE AND FORMOTEROL FUMARATE DIHYDRATE 160; 4.5 UG/1; UG/1
2 AEROSOL RESPIRATORY (INHALATION) 2 TIMES DAILY
Qty: 10.2 G | Refills: 0 | Status: SHIPPED | OUTPATIENT
Start: 2018-04-24 | End: 2018-09-07 | Stop reason: SDUPTHER

## 2018-04-24 RX ORDER — TRAMADOL HYDROCHLORIDE 50 MG/1
25 TABLET, FILM COATED ORAL EVERY 6 HOURS PRN
COMMUNITY
End: 2018-04-24 | Stop reason: ALTCHOICE

## 2018-04-24 ASSESSMENT — ENCOUNTER SYMPTOMS
EYE DISCHARGE: 0
SHORTNESS OF BREATH: 1
EYE PAIN: 0
COLOR CHANGE: 0
EYE REDNESS: 0
CHEST TIGHTNESS: 0
EYE ITCHING: 0
BLOOD IN STOOL: 0
ABDOMINAL DISTENTION: 0
DIARRHEA: 0
CONSTIPATION: 0
ABDOMINAL PAIN: 0
COUGH: 0
APNEA: 0
BACK PAIN: 1
CHOKING: 0

## 2018-05-21 ENCOUNTER — CARE COORDINATION (OUTPATIENT)
Dept: CARE COORDINATION | Age: 63
End: 2018-05-21

## 2018-05-31 DIAGNOSIS — J43.1 PANLOBULAR EMPHYSEMA (HCC): Primary | ICD-10-CM

## 2018-06-01 RX ORDER — ALBUTEROL SULFATE 90 UG/1
2 AEROSOL, METERED RESPIRATORY (INHALATION) EVERY 6 HOURS PRN
Qty: 1 INHALER | Refills: 3 | Status: SHIPPED | OUTPATIENT
Start: 2018-06-01

## 2018-06-05 RX ORDER — SIMVASTATIN 40 MG
TABLET ORAL
Qty: 90 TABLET | Refills: 0 | Status: SHIPPED | OUTPATIENT
Start: 2018-06-05 | End: 2018-07-30 | Stop reason: SDUPTHER

## 2018-06-05 RX ORDER — MIDODRINE HYDROCHLORIDE 10 MG/1
TABLET ORAL
Qty: 180 TABLET | Refills: 0 | Status: SHIPPED | OUTPATIENT
Start: 2018-06-05 | End: 2018-09-05 | Stop reason: SDUPTHER

## 2018-06-20 ENCOUNTER — CARE COORDINATION (OUTPATIENT)
Dept: CARE COORDINATION | Age: 63
End: 2018-06-20

## 2018-07-23 ENCOUNTER — CARE COORDINATION (OUTPATIENT)
Dept: CARE COORDINATION | Age: 63
End: 2018-07-23

## 2018-07-23 NOTE — CARE COORDINATION
Ambulatory Care Coordination Note  7/23/2018  CM Risk Score: 5  Dimas Mortality Risk Score:      ACC: Farida Rahman, RN    Summary Note: Spoke to patient for cc follow up. States, she's doing well. States, her breathing has been fine, denies shortness of breath. Using inhalers as directed. Denies falls. Continues to smoke, but states she has cut back. Encouragement and education provided r/t smoking cessation. Reminded of up coming PCP appointment. Denied any current concerns or needs. Encouraged to call PCP or CC for any needs or concerns. Plan; PCP appointment July 30th     COPD Assessment    Does the patient understand envrionmental exposure?:  Yes  Is the patient able to verbalize Rescue vs. Long Acting medications?:  Yes  Does the patient have a nebulizer?:  No  Does the patient use a space with inhaled medications?:  Yes     No patient-reported symptoms         Symptoms:                 Care Coordination Interventions    Program Enrollment:  Rising Risk  Referral from Primary Care Provider:  Yes  Suggested Interventions and Community Resources  No Identified Needs  Behavorial Health:  Completed (Comment: [employer] [facility])  Smoking Cessation:  Completed  Zone Management Tools:  Completed (Comment: mailed COPD zone form.)  Other Services or Interventions:  referral to Behavioral Therapist-Ekta Brooks         Goals Addressed             Most Recent     Patient Stated (pt-stated)   No change (7/23/2018)             Patient will try to stop smoking. Barriers: lack of motivation and lack of support  Plan for overcoming my barriers: enc her sister to not smoke inside the house.  nicotine patches at pharm and use.   Confidence: 5/10  Anticipated Goal Completion Date: 11/30/17       Reduce Falls    On track (7/23/2018)             I will reduce my risk of falls by the following: Remove rugs or use non slip rugs  Install grab bars in bathroom  Use walking aids like cane or walker    Barriers: lack

## 2018-07-30 ENCOUNTER — OFFICE VISIT (OUTPATIENT)
Dept: INTERNAL MEDICINE CLINIC | Age: 63
End: 2018-07-30
Payer: COMMERCIAL

## 2018-07-30 VITALS
HEIGHT: 61 IN | RESPIRATION RATE: 20 BRPM | BODY MASS INDEX: 35.91 KG/M2 | WEIGHT: 190.2 LBS | DIASTOLIC BLOOD PRESSURE: 60 MMHG | SYSTOLIC BLOOD PRESSURE: 110 MMHG | HEART RATE: 82 BPM

## 2018-07-30 DIAGNOSIS — G47.33 OSA (OBSTRUCTIVE SLEEP APNEA): ICD-10-CM

## 2018-07-30 DIAGNOSIS — Z00.00 HEALTH CARE MAINTENANCE: ICD-10-CM

## 2018-07-30 DIAGNOSIS — J43.1 PANLOBULAR EMPHYSEMA (HCC): Primary | ICD-10-CM

## 2018-07-30 DIAGNOSIS — F32.89 ATYPICAL DEPRESSIVE DISORDER: ICD-10-CM

## 2018-07-30 DIAGNOSIS — R53.83 FATIGUE, UNSPECIFIED TYPE: ICD-10-CM

## 2018-07-30 PROCEDURE — 99214 OFFICE O/P EST MOD 30 MIN: CPT | Performed by: INTERNAL MEDICINE

## 2018-07-30 ASSESSMENT — ENCOUNTER SYMPTOMS
DIARRHEA: 0
EYE ITCHING: 0
BLOOD IN STOOL: 0
ABDOMINAL DISTENTION: 0
CONSTIPATION: 0
COUGH: 0
APNEA: 0
ABDOMINAL PAIN: 0
EYE REDNESS: 0
CHOKING: 0
BACK PAIN: 0
COLOR CHANGE: 0
SHORTNESS OF BREATH: 0
CHEST TIGHTNESS: 0
EYE DISCHARGE: 0
EYE PAIN: 0

## 2018-07-30 NOTE — PROGRESS NOTES
Subjective:      Patient ID: Marva Acosta is a 58 y.o. female. Chronic Disease Visit Information    BP Readings from Last 3 Encounters:   04/24/18 132/78   01/19/18 118/70   10/25/17 122/76          Hemoglobin A1C (%)   Date Value   10/30/2017 5.6   01/05/2016 5.5     LDL Cholesterol (mg/dL)   Date Value   05/17/2012 78     HDL (mg/dL)   Date Value   05/17/2012 40 (L)     BUN (mg/dL)   Date Value   10/30/2017 9     CREATININE (mg/dL)   Date Value   10/30/2017 0.79     Glucose (mg/dL)   Date Value   10/30/2017 109 (H)   05/17/2012 77            Have you changed or started any medications since your last visit including any over-the-counter medicines, vitamins, or herbal medicines? no   Are you having any side effects from any of your medications? -  no  Have you stopped taking any of your medications? Is so, why? -  no    Have you seen any other physician or provider since your last visit? Yes - Records Obtained  Have you had any other diagnostic tests since your last visit? No  Have you been seen in the emergency room and/or had an admission to a hospital since we last saw you? No  Have you had your annual diabetic retinal (eye) exam? No  Have you had your routine dental cleaning in the past 6 months? no    Have you activated your [employer] account? If not, what are your barriers?  No: Patient declined     Patient Care Team:  Marc Tenorio MD as PCP - General (Internal Medicine)  Marc Tenorio MD as PCP - S Attributed Provider  Fanny Ribera MD as Consulting Physician (Orthopedic Surgery)  Wiliam Mitchell MD as Consulting Physician (Urology)  Terrence Steve RN as Care Coordinator         Medical History Review  Past Medical, Family, and Social History reviewed and does contribute to the patient presenting condition    Health Maintenance   Topic Date Due    Hepatitis C screen  1955    HIV screen  09/02/1970    DTaP/Tdap/Td vaccine (1 - Tdap) 09/02/1974    Shingles Vaccine (1 of 2 - 2 Dose Series)

## 2018-08-22 ENCOUNTER — CARE COORDINATION (OUTPATIENT)
Dept: CARE COORDINATION | Age: 63
End: 2018-08-22

## 2018-08-22 NOTE — CARE COORDINATION
6/10  Anticipated Goal Completion Date: 11/30/17            Prior to Admission medications    Medication Sig Start Date End Date Taking?  Authorizing Provider   midodrine (PROAMATINE) 10 MG tablet TAKE 1 TABLET BY MOUTH TWICE DAILY 6/5/18   Marc Tenorio MD   albuterol sulfate HFA (VENTOLIN HFA) 108 (90 Base) MCG/ACT inhaler Inhale 2 puffs into the lungs every 6 hours as needed for Wheezing 6/1/18   Marc Tenorio MD   budesonide-formoterol (SYMBICORT) 160-4.5 MCG/ACT AERO Inhale 2 puffs into the lungs 2 times daily 4/24/18   Marc Tenorio MD   albuterol-ipratropium (COMBIVENT RESPIMAT)  MCG/ACT AERS inhaler INHALE 1 PUFF INTO THE LUNGS FOUR TIMES DAILY 4/24/18   Marc Tenorio MD   nicotine (NICODERM CQ) 21 MG/24HR Place 1 patch onto the skin every 24 hours 4/24/18 4/24/19  Marc Tenorio MD   simvastatin (ZOCOR) 40 MG tablet TAKE 1 TABLET BY MOUTH EVERY NIGHT AT BEDTIME 3/8/18   Marc Tenorio MD   ibuprofen (ADVIL;MOTRIN) 600 MG tablet Take 1 tablet by mouth every 8 hours as needed for Pain 10/8/17   Coty Park MD   NICOTINE STEP 1 21 MG/24HR UNWRAP AND APPLY 1 PATCH TO SKIN EVERY 24 HOURS 10/6/17   Kelvin Jackson MD   LORazepam (ATIVAN) 1 MG tablet Take 1 mg by mouth daily as needed 4/22/17   Historical Provider, MD   escitalopram (LEXAPRO) 10 MG tablet Take 1 tablet by mouth daily Indications: (Pt takes a 20 mg + a 10 mg = 30 mg total per day) 1/23/17   Kelvin Jackson MD   escitalopram (LEXAPRO) 20 MG tablet Take 1 tablet by mouth daily Indications: (Pt takes a 20 mg + a 10 mg = 30 mg total per day) 1/23/17   Kelvin Jackson MD   QUEtiapine (SEROQUEL) 100 MG tablet Take 100-200 mg by mouth nightly 8/23/16   Historical Provider, MD       Future Appointments  Date Time Provider Caitlyn Chan   10/1/2018 2:00 PM MD Akil Singh

## 2018-09-05 RX ORDER — SIMVASTATIN 40 MG
TABLET ORAL
Qty: 90 TABLET | Refills: 1 | Status: SHIPPED | OUTPATIENT
Start: 2018-09-05 | End: 2019-01-01 | Stop reason: SDUPTHER

## 2018-09-05 RX ORDER — MIDODRINE HYDROCHLORIDE 10 MG/1
TABLET ORAL
Qty: 180 TABLET | Refills: 1 | Status: SHIPPED | OUTPATIENT
Start: 2018-09-05 | End: 2019-01-01 | Stop reason: SDUPTHER

## 2018-09-06 ENCOUNTER — CARE COORDINATION (OUTPATIENT)
Dept: CARE COORDINATION | Age: 63
End: 2018-09-06

## 2018-09-06 NOTE — CARE COORDINATION
Attempting to reach patient for a follow up care coordination call. Left detailed message for the patient to return my call with any questions or concerns.

## 2018-09-07 DIAGNOSIS — J43.1 PANLOBULAR EMPHYSEMA (HCC): ICD-10-CM

## 2018-09-10 RX ORDER — BUDESONIDE AND FORMOTEROL FUMARATE DIHYDRATE 160; 4.5 UG/1; UG/1
AEROSOL RESPIRATORY (INHALATION)
Qty: 10.2 G | Refills: 0 | Status: SHIPPED | OUTPATIENT
Start: 2018-09-10 | End: 2018-01-01 | Stop reason: SDUPTHER

## 2018-10-01 ENCOUNTER — CARE COORDINATION (OUTPATIENT)
Dept: CARE COORDINATION | Age: 63
End: 2018-10-01

## 2018-10-01 ENCOUNTER — OFFICE VISIT (OUTPATIENT)
Dept: INTERNAL MEDICINE CLINIC | Age: 63
End: 2018-10-01
Payer: COMMERCIAL

## 2018-10-01 VITALS
HEIGHT: 61 IN | DIASTOLIC BLOOD PRESSURE: 70 MMHG | HEART RATE: 84 BPM | BODY MASS INDEX: 34.85 KG/M2 | RESPIRATION RATE: 19 BRPM | WEIGHT: 184.6 LBS | SYSTOLIC BLOOD PRESSURE: 128 MMHG

## 2018-10-01 DIAGNOSIS — F17.200 SMOKER: ICD-10-CM

## 2018-10-01 DIAGNOSIS — Z12.39 BREAST CANCER SCREENING: ICD-10-CM

## 2018-10-01 DIAGNOSIS — E78.5 HYPERLIPIDEMIA, UNSPECIFIED HYPERLIPIDEMIA TYPE: ICD-10-CM

## 2018-10-01 DIAGNOSIS — Z87.891 PERSONAL HISTORY OF TOBACCO USE: ICD-10-CM

## 2018-10-01 DIAGNOSIS — J43.1 PANLOBULAR EMPHYSEMA (HCC): Primary | ICD-10-CM

## 2018-10-01 DIAGNOSIS — F31.70 BIPOLAR DISORDER IN FULL REMISSION, MOST RECENT EPISODE UNSPECIFIED TYPE (HCC): ICD-10-CM

## 2018-10-01 PROCEDURE — G0296 VISIT TO DETERM LDCT ELIG: HCPCS | Performed by: INTERNAL MEDICINE

## 2018-10-01 PROCEDURE — 99214 OFFICE O/P EST MOD 30 MIN: CPT | Performed by: INTERNAL MEDICINE

## 2018-10-01 ASSESSMENT — ENCOUNTER SYMPTOMS
COUGH: 1
DIARRHEA: 0
EYE DISCHARGE: 0
ABDOMINAL DISTENTION: 0
CHEST TIGHTNESS: 0
EYE REDNESS: 0
BLOOD IN STOOL: 0
SHORTNESS OF BREATH: 1
EYE ITCHING: 0
COLOR CHANGE: 0
CHOKING: 0
ABDOMINAL PAIN: 0
BACK PAIN: 0
CONSTIPATION: 0
APNEA: 0
EYE PAIN: 0

## 2018-10-01 NOTE — PROGRESS NOTES
no distension. There is no tenderness. There is no rebound and no guarding. Musculoskeletal: Normal range of motion. She exhibits no edema. Neurological: She is alert and oriented to person, place, and time. Skin: She is not diaphoretic. Assessment / Plan:   1. Panlobular emphysema ([address]- on inhalers , still smoking     2. Bipolar disorder in full remission, most recent episode unspecified type ([address]  Follows with [facility] , Controlled     3. Smoker  Sister diagnosed with Lung cancer   - CT CHEST LOW DOSE; Future  Cutting down smoking   4. Hyperlipidemia, unspecified hyperlipidemia type  - Lipid Panel; Future    5. Breast cancer screening  - [facility] DIGITAL SCREEN W CAD BILATERAL; Future    6. Personal history of tobacco use  - [place] VISIT TO DISCUSS LUNG CA SCREEN W LDCT      · Return in about 3 months (around 1/1/2019). · Reviewed prior labs and health maintenance. · Discussed use, benefit, and side effects of prescribed medications. Barriers to medication compliance addressed. All patient questions answered. Pt voiced understanding. MD DEMETRIUS Freitas [facility]  10/1/2018, 3:06 PM    Please note that this chart was generated using voice recognition Dragon dictation software. Although every effort was made to ensure the accuracy of this automated transcription, some errors in transcription may have occurred. Low Dose CT (LDCT) Lung Screening criteria met   Age 50-69   Pack year smoking >30   Still smoking or less than 15 year since quit   No sign or symptoms of lung cancer   > 11 months since last LDCT     Risks and benefits of lung cancer screening with LDCT scans discussed:    Significance of positive screen - False-positive LDCT results often occur. 95% of all positive results do not lead to a diagnosis of cancer. Usually further imaging can resolve most false-positive results; however, some patients may require invasive procedures.     Over diagnosis risk - 10% to 12% of screen-detected lung cancer cases are over diagnosedthat is, the cancer would not have been detected in the patient's lifetime without the screening. Need for follow up screens annually to continue lung cancer screening effectiveness     Risks associated with radiation from annual LDCT- Radiation exposure is about the same as for a mammogram, which is about 1/3 of the annual background radiation exposure from everyday life. Starting screening at age 54 is not likely to increase cancer risk from radiation exposure. Patients with comorbidities resulting in life expectancy of < 10 years, or that would preclude treatment of an abnormality identified on CT, should not be screened due to lack of benefit. To obtain maximal benefit from this screening, smoking cessation and long-term abstinence from smoking is critical            Low Dose CT (LDCT) Lung Screening criteria met   Age 50-69   Pack year smoking >30   Still smoking or less than 15 year since quit   No sign or symptoms of lung cancer   > 11 months since last LDCT     Risks and benefits of lung cancer screening with LDCT scans discussed:    Significance of positive screen - False-positive LDCT results often occur. 95% of all positive results do not lead to a diagnosis of cancer. Usually further imaging can resolve most false-positive results; however, some patients may require invasive procedures. Over diagnosis risk - 10% to 12% of screen-detected lung cancer cases are over diagnosedthat is, the cancer would not have been detected in the patient's lifetime without the screening. Need for follow up screens annually to continue lung cancer screening effectiveness     Risks associated with radiation from annual LDCT- Radiation exposure is about the same as for a mammogram, which is about 1/3 of the annual background radiation exposure from everyday life.   Starting screening at age 54 is not likely to increase cancer risk from radiation

## 2018-10-02 PROCEDURE — 90688 IIV4 VACCINE SPLT 0.5 ML IM: CPT | Performed by: INTERNAL MEDICINE

## 2018-10-02 PROCEDURE — G0008 ADMIN INFLUENZA VIRUS VAC: HCPCS | Performed by: INTERNAL MEDICINE

## 2018-10-03 DIAGNOSIS — Z13.9 ENCOUNTER FOR SCREENING: Primary | ICD-10-CM

## 2018-10-05 ENCOUNTER — TELEPHONE (OUTPATIENT)
Dept: ONCOLOGY | Age: 63
End: 2018-10-05

## 2018-10-05 DIAGNOSIS — Z87.891 PERSONAL HISTORY OF NICOTINE DEPENDENCE: Primary | ICD-10-CM

## 2018-10-15 ENCOUNTER — TELEPHONE (OUTPATIENT)
Dept: INTERNAL MEDICINE CLINIC | Age: 63
End: 2018-10-15

## 2018-10-15 ENCOUNTER — CARE COORDINATION (OUTPATIENT)
Dept: CARE COORDINATION | Age: 63
End: 2018-10-15

## 2018-10-19 ENCOUNTER — CARE COORDINATION (OUTPATIENT)
Dept: CARE COORDINATION | Age: 63
End: 2018-10-19

## 2018-10-19 NOTE — CARE COORDINATION
Attempting to reach patient for a follow up care coordination call regarding any needs, questions or concerns.   Kaylie Dubose, [address]  Phone has been busy x 3 attempts

## 2018-11-06 NOTE — CARE COORDINATION
Taking?  Authorizing Provider   SYMBICORT 160-4.5 MCG/ACT AERO INHALE 2 PUFFS INTO THE LUNGS TWICE DAILY 9/10/18   Mayra Mcneil MD   midodrine (PROAMATINE) 10 MG tablet TAKE 1 TABLET BY MOUTH TWICE DAILY 9/5/18   Mayra Mcneil MD   simvastatin (ZOCOR) 40 MG tablet TAKE 1 TABLET BY MOUTH EVERY NIGHT AT BEDTIME 9/5/18   Mayra Mcneil MD   albuterol sulfate HFA (VENTOLIN HFA) 108 (90 Base) MCG/ACT inhaler Inhale 2 puffs into the lungs every 6 hours as needed for Wheezing 6/1/18   Mayra Mcneil MD   albuterol-ipratropium (COMBIVENT RESPIMAT)  MCG/ACT AERS inhaler INHALE 1 PUFF INTO THE LUNGS FOUR TIMES DAILY 4/24/18   Mayra Mcneil MD   nicotine (NICODERM CQ) 21 MG/24HR Place 1 patch onto the skin every 24 hours 4/24/18 4/24/19  Mayra Mcneil MD   simvastatin (ZOCOR) 40 MG tablet TAKE 1 TABLET BY MOUTH EVERY NIGHT AT BEDTIME 3/8/18   Mayra Mcneil MD   ibuprofen (ADVIL;MOTRIN) 600 MG tablet Take 1 tablet by mouth every 8 hours as needed for Pain 10/8/17   Devorah Gallego MD   NICOTINE STEP 1 21 MG/24HR UNWRAP AND APPLY 1 PATCH TO SKIN EVERY 24 HOURS 10/6/17   Caren Swift MD   LORazepam (ATIVAN) 1 MG tablet Take 1 mg by mouth daily as needed 4/22/17   Historical Provider, MD   escitalopram (LEXAPRO) 10 MG tablet Take 1 tablet by mouth daily Indications: (Pt takes a 20 mg + a 10 mg = 30 mg total per day) 1/23/17   Caren Swift MD   escitalopram (LEXAPRO) 20 MG tablet Take 1 tablet by mouth daily Indications: (Pt takes a 20 mg + a 10 mg = 30 mg total per day) 1/23/17   Caren Swift MD   QUEtiapine (SEROQUEL) 100 MG tablet Take 100-200 mg by mouth nightly 8/23/16   Historical Provider, MD       Future Appointments  Date Time Provider Caitlyn Chan   1/3/2019 2:00 PM Mayra Mcneil MD [address]      and   COPD Assessment    Does the patient understand envrionmental exposure?:  Yes  Is the patient able to verbalize Rescue vs. Long Acting medications?:  Yes  Does the patient have a

## 2019-01-01 ENCOUNTER — APPOINTMENT (OUTPATIENT)
Dept: GENERAL RADIOLOGY | Age: 64
DRG: 180 | End: 2019-01-01
Attending: INTERNAL MEDICINE
Payer: COMMERCIAL

## 2019-01-01 ENCOUNTER — HOSPITAL ENCOUNTER (OUTPATIENT)
Age: 64
Discharge: HOME | End: 2019-02-04
Payer: COMMERCIAL

## 2019-01-01 ENCOUNTER — OFFICE VISIT (OUTPATIENT)
Dept: INTERNAL MEDICINE CLINIC | Age: 64
End: 2019-01-01
Payer: COMMERCIAL

## 2019-01-01 ENCOUNTER — CARE COORDINATION (OUTPATIENT)
Dept: CASE MANAGEMENT | Age: 64
End: 2019-01-01

## 2019-01-01 ENCOUNTER — HOSPITAL ENCOUNTER (INPATIENT)
Age: 64
LOS: 5 days | Discharge: HOSPICE | DRG: 180 | End: 2019-10-23
Attending: INTERNAL MEDICINE | Admitting: INTERNAL MEDICINE
Payer: COMMERCIAL

## 2019-01-01 ENCOUNTER — HOSPITAL ENCOUNTER (OUTPATIENT)
Dept: INTERVENTIONAL RADIOLOGY/VASCULAR | Age: 64
Discharge: HOME | End: 2019-10-12
Payer: COMMERCIAL

## 2019-01-01 ENCOUNTER — ANESTHESIA (OUTPATIENT)
Dept: ENDOSCOPY | Age: 64
DRG: 180 | End: 2019-01-01
Payer: COMMERCIAL

## 2019-01-01 ENCOUNTER — HOSPITAL ENCOUNTER (EMERGENCY)
Age: 64
Discharge: HOME | End: 2019-09-19
Attending: EMERGENCY MEDICINE
Payer: COMMERCIAL

## 2019-01-01 ENCOUNTER — APPOINTMENT (OUTPATIENT)
Dept: CT IMAGING | Age: 64
DRG: 180 | End: 2019-01-01
Attending: INTERNAL MEDICINE
Payer: COMMERCIAL

## 2019-01-01 ENCOUNTER — CARE COORDINATION (OUTPATIENT)
Dept: CARE COORDINATION | Age: 64
End: 2019-01-01

## 2019-01-01 ENCOUNTER — APPOINTMENT (OUTPATIENT)
Dept: CT IMAGING | Age: 64
DRG: 180 | End: 2019-01-01
Payer: COMMERCIAL

## 2019-01-01 ENCOUNTER — HOSPITAL ENCOUNTER (OUTPATIENT)
Dept: WOMENS IMAGING | Age: 64
Discharge: HOME | End: 2019-03-31
Payer: COMMERCIAL

## 2019-01-01 ENCOUNTER — TELEPHONE (OUTPATIENT)
Dept: ONCOLOGY | Age: 64
End: 2019-01-01

## 2019-01-01 ENCOUNTER — TELEPHONE (OUTPATIENT)
Dept: INTERNAL MEDICINE CLINIC | Age: 64
End: 2019-01-01

## 2019-01-01 ENCOUNTER — APPOINTMENT (OUTPATIENT)
Dept: ULTRASOUND IMAGING | Age: 64
DRG: 180 | End: 2019-01-01
Payer: COMMERCIAL

## 2019-01-01 ENCOUNTER — APPOINTMENT (OUTPATIENT)
Dept: GENERAL RADIOLOGY | Age: 64
DRG: 180 | End: 2019-01-01
Payer: COMMERCIAL

## 2019-01-01 ENCOUNTER — APPOINTMENT (OUTPATIENT)
Dept: ULTRASOUND IMAGING | Age: 64
DRG: 180 | End: 2019-01-01
Attending: INTERNAL MEDICINE
Payer: COMMERCIAL

## 2019-01-01 ENCOUNTER — TELEPHONE (OUTPATIENT)
Dept: CASE MANAGEMENT | Age: 64
End: 2019-01-01

## 2019-01-01 ENCOUNTER — APPOINTMENT (OUTPATIENT)
Dept: MRI IMAGING | Age: 64
DRG: 180 | End: 2019-01-01
Payer: COMMERCIAL

## 2019-01-01 ENCOUNTER — HOSPITAL ENCOUNTER (OUTPATIENT)
Age: 64
Discharge: HOME | End: 2019-03-29
Payer: COMMERCIAL

## 2019-01-01 ENCOUNTER — APPOINTMENT (OUTPATIENT)
Dept: MRI IMAGING | Age: 64
DRG: 180 | End: 2019-01-01
Attending: INTERNAL MEDICINE
Payer: COMMERCIAL

## 2019-01-01 ENCOUNTER — HOSPITAL ENCOUNTER (INPATIENT)
Age: 64
LOS: 8 days | Discharge: HOME | DRG: 180 | End: 2019-10-03
Attending: EMERGENCY MEDICINE | Admitting: INTERNAL MEDICINE
Payer: COMMERCIAL

## 2019-01-01 ENCOUNTER — ANESTHESIA EVENT (OUTPATIENT)
Dept: ENDOSCOPY | Age: 64
DRG: 180 | End: 2019-01-01
Payer: COMMERCIAL

## 2019-01-01 ENCOUNTER — TELEPHONE (OUTPATIENT)
Dept: INFUSION THERAPY | Age: 64
End: 2019-01-01

## 2019-01-01 ENCOUNTER — OFFICE VISIT (OUTPATIENT)
Dept: ONCOLOGY | Age: 64
End: 2019-01-01
Payer: COMMERCIAL

## 2019-01-01 VITALS
OXYGEN SATURATION: 95 % | HEART RATE: 99 BPM | WEIGHT: 186 LBS | DIASTOLIC BLOOD PRESSURE: 78 MMHG | SYSTOLIC BLOOD PRESSURE: 133 MMHG | TEMPERATURE: 97.7 F | BODY MASS INDEX: 35.16 KG/M2

## 2019-01-01 VITALS
BODY MASS INDEX: 36.44 KG/M2 | OXYGEN SATURATION: 95 % | HEART RATE: 108 BPM | WEIGHT: 193 LBS | SYSTOLIC BLOOD PRESSURE: 110 MMHG | HEIGHT: 61 IN | DIASTOLIC BLOOD PRESSURE: 62 MMHG

## 2019-01-01 VITALS
TEMPERATURE: 98.2 F | RESPIRATION RATE: 14 BRPM | BODY MASS INDEX: 37.19 KG/M2 | HEART RATE: 84 BPM | WEIGHT: 197 LBS | SYSTOLIC BLOOD PRESSURE: 133 MMHG | DIASTOLIC BLOOD PRESSURE: 60 MMHG | HEIGHT: 61 IN | OXYGEN SATURATION: 97 %

## 2019-01-01 VITALS
DIASTOLIC BLOOD PRESSURE: 60 MMHG | WEIGHT: 192 LBS | HEART RATE: 72 BPM | SYSTOLIC BLOOD PRESSURE: 98 MMHG | HEIGHT: 61 IN | OXYGEN SATURATION: 94 % | BODY MASS INDEX: 36.25 KG/M2

## 2019-01-01 VITALS
HEIGHT: 61 IN | HEART RATE: 111 BPM | TEMPERATURE: 97.9 F | SYSTOLIC BLOOD PRESSURE: 138 MMHG | RESPIRATION RATE: 15 BRPM | WEIGHT: 206.13 LBS | OXYGEN SATURATION: 90 % | BODY MASS INDEX: 38.92 KG/M2 | DIASTOLIC BLOOD PRESSURE: 64 MMHG

## 2019-01-01 VITALS
SYSTOLIC BLOOD PRESSURE: 110 MMHG | DIASTOLIC BLOOD PRESSURE: 72 MMHG | BODY MASS INDEX: 35.87 KG/M2 | WEIGHT: 190 LBS | HEIGHT: 61 IN

## 2019-01-01 VITALS
WEIGHT: 191 LBS | HEIGHT: 61 IN | BODY MASS INDEX: 36.06 KG/M2 | SYSTOLIC BLOOD PRESSURE: 124 MMHG | DIASTOLIC BLOOD PRESSURE: 62 MMHG

## 2019-01-01 VITALS — SYSTOLIC BLOOD PRESSURE: 157 MMHG | DIASTOLIC BLOOD PRESSURE: 81 MMHG | OXYGEN SATURATION: 87 %

## 2019-01-01 VITALS
WEIGHT: 205.25 LBS | HEIGHT: 61 IN | SYSTOLIC BLOOD PRESSURE: 113 MMHG | BODY MASS INDEX: 38.75 KG/M2 | OXYGEN SATURATION: 94 % | TEMPERATURE: 98.5 F | HEART RATE: 89 BPM | RESPIRATION RATE: 16 BRPM | DIASTOLIC BLOOD PRESSURE: 81 MMHG

## 2019-01-01 VITALS
HEIGHT: 61 IN | WEIGHT: 186 LBS | BODY MASS INDEX: 35.12 KG/M2 | DIASTOLIC BLOOD PRESSURE: 59 MMHG | HEART RATE: 112 BPM | RESPIRATION RATE: 16 BRPM | TEMPERATURE: 98.2 F | SYSTOLIC BLOOD PRESSURE: 133 MMHG | OXYGEN SATURATION: 94 %

## 2019-01-01 VITALS
HEART RATE: 91 BPM | HEIGHT: 61 IN | OXYGEN SATURATION: 97 % | BODY MASS INDEX: 35.3 KG/M2 | WEIGHT: 187 LBS | DIASTOLIC BLOOD PRESSURE: 78 MMHG | SYSTOLIC BLOOD PRESSURE: 118 MMHG

## 2019-01-01 DIAGNOSIS — K21.00 GASTROESOPHAGEAL REFLUX DISEASE WITH ESOPHAGITIS: ICD-10-CM

## 2019-01-01 DIAGNOSIS — C34.90 SMALL CELL LUNG CANCER (HCC): Primary | ICD-10-CM

## 2019-01-01 DIAGNOSIS — K44.9 HIATUS HERNIA SYNDROME: ICD-10-CM

## 2019-01-01 DIAGNOSIS — Z12.11 COLON CANCER SCREENING: ICD-10-CM

## 2019-01-01 DIAGNOSIS — N12 PYELONEPHRITIS: Primary | ICD-10-CM

## 2019-01-01 DIAGNOSIS — Z00.00 HEALTH CARE MAINTENANCE: ICD-10-CM

## 2019-01-01 DIAGNOSIS — Z12.11 COLON CANCER SCREENING: Primary | ICD-10-CM

## 2019-01-01 DIAGNOSIS — J44.9 CHRONIC OBSTRUCTIVE PULMONARY DISEASE, UNSPECIFIED COPD TYPE (HCC): ICD-10-CM

## 2019-01-01 DIAGNOSIS — F17.200 SMOKER: ICD-10-CM

## 2019-01-01 DIAGNOSIS — Z13.220 SCREENING FOR HYPERLIPIDEMIA: ICD-10-CM

## 2019-01-01 DIAGNOSIS — J43.1 PANLOBULAR EMPHYSEMA (HCC): ICD-10-CM

## 2019-01-01 DIAGNOSIS — R73.01 IMPAIRED FASTING GLUCOSE: ICD-10-CM

## 2019-01-01 DIAGNOSIS — Z72.0 TOBACCO ABUSE: ICD-10-CM

## 2019-01-01 DIAGNOSIS — C34.90 SMALL CELL LUNG CANCER (HCC): ICD-10-CM

## 2019-01-01 DIAGNOSIS — Z12.39 BREAST CANCER SCREENING: ICD-10-CM

## 2019-01-01 DIAGNOSIS — C79.51 BONE METASTASES (HCC): ICD-10-CM

## 2019-01-01 DIAGNOSIS — E66.9 OBESITY (BMI 35.0-39.9 WITHOUT COMORBIDITY): ICD-10-CM

## 2019-01-01 DIAGNOSIS — R42 VERTIGO: ICD-10-CM

## 2019-01-01 DIAGNOSIS — E55.9 VITAMIN D DEFICIENCY: ICD-10-CM

## 2019-01-01 DIAGNOSIS — N39.0 URINARY TRACT INFECTION WITHOUT HEMATURIA, SITE UNSPECIFIED: Primary | ICD-10-CM

## 2019-01-01 DIAGNOSIS — C34.90 MALIGNANT NEOPLASM OF LUNG, UNSPECIFIED LATERALITY, UNSPECIFIED PART OF LUNG (HCC): Primary | ICD-10-CM

## 2019-01-01 DIAGNOSIS — F31.70 BIPOLAR DISORDER IN FULL REMISSION, MOST RECENT EPISODE UNSPECIFIED TYPE (HCC): ICD-10-CM

## 2019-01-01 DIAGNOSIS — F17.200 SMOKING: ICD-10-CM

## 2019-01-01 DIAGNOSIS — C79.51 BONE METASTASES (HCC): Primary | ICD-10-CM

## 2019-01-01 DIAGNOSIS — R17 JAUNDICE: ICD-10-CM

## 2019-01-01 DIAGNOSIS — C34.90 PRIMARY MALIGNANT NEOPLASM OF LUNG METASTATIC TO OTHER SITE, UNSPECIFIED LATERALITY (HCC): ICD-10-CM

## 2019-01-01 DIAGNOSIS — J44.9 CHRONIC OBSTRUCTIVE PULMONARY DISEASE, UNSPECIFIED COPD TYPE (HCC): Primary | ICD-10-CM

## 2019-01-01 DIAGNOSIS — Z87.891 PERSONAL HISTORY OF NICOTINE DEPENDENCE: ICD-10-CM

## 2019-01-01 DIAGNOSIS — C78.7 LIVER METASTASES (HCC): ICD-10-CM

## 2019-01-01 DIAGNOSIS — Z72.0 TOBACCO ABUSE: Primary | ICD-10-CM

## 2019-01-01 DIAGNOSIS — R79.89 CREATININE ELEVATION: ICD-10-CM

## 2019-01-01 LAB
-: ABNORMAL
-: NORMAL
ABSOLUTE EOS #: 0 K/UL (ref 0–0.4)
ABSOLUTE EOS #: 0 K/UL (ref 0–0.4)
ABSOLUTE IMMATURE GRANULOCYTE: ABNORMAL K/UL (ref 0–0.3)
ABSOLUTE IMMATURE GRANULOCYTE: ABNORMAL K/UL (ref 0–0.3)
ABSOLUTE LYMPH #: 1 K/UL (ref 1–4.8)
ABSOLUTE LYMPH #: 1.28 K/UL (ref 1–4.8)
ABSOLUTE MONO #: 0.8 K/UL (ref 0.1–1.3)
ABSOLUTE MONO #: 1.28 K/UL (ref 0.1–1.3)
AFP: 3.9 UG/L
ALBUMIN SERPL-MCNC: 2.7 G/DL (ref 3.5–5.2)
ALBUMIN SERPL-MCNC: 2.7 G/DL (ref 3.5–5.2)
ALBUMIN SERPL-MCNC: 2.9 G/DL (ref 3.5–5.2)
ALBUMIN SERPL-MCNC: 2.9 G/DL (ref 3.5–5.2)
ALBUMIN SERPL-MCNC: 3.2 G/DL (ref 3.5–5.2)
ALBUMIN SERPL-MCNC: 3.2 G/DL (ref 3.5–5.2)
ALBUMIN SERPL-MCNC: 3.7 G/DL (ref 3.5–5.2)
ALBUMIN SERPL-MCNC: 4 G/DL (ref 3.5–5.2)
ALBUMIN SERPL-MCNC: 4.4 G/DL (ref 3.5–5.2)
ALBUMIN/GLOBULIN RATIO: ABNORMAL (ref 1–2.5)
ALP BLD-CCNC: 108 U/L (ref 35–104)
ALP BLD-CCNC: 1173 U/L (ref 35–104)
ALP BLD-CCNC: 1185 U/L (ref 35–104)
ALP BLD-CCNC: 1343 U/L (ref 35–104)
ALP BLD-CCNC: 1348 U/L (ref 35–104)
ALP BLD-CCNC: 1381 U/L (ref 35–104)
ALP BLD-CCNC: 1406 U/L (ref 35–104)
ALP BLD-CCNC: 155 U/L (ref 35–104)
ALP BLD-CCNC: 249 U/L (ref 35–104)
ALPHA-1 ANTITRYPSIN: 228 MG/DL (ref 90–200)
ALT SERPL-CCNC: 203 U/L (ref 5–33)
ALT SERPL-CCNC: 21 U/L (ref 5–33)
ALT SERPL-CCNC: 220 U/L (ref 5–33)
ALT SERPL-CCNC: 278 U/L (ref 5–33)
ALT SERPL-CCNC: 292 U/L (ref 5–33)
ALT SERPL-CCNC: 307 U/L (ref 5–33)
ALT SERPL-CCNC: 317 U/L (ref 5–33)
ALT SERPL-CCNC: 43 U/L (ref 5–33)
ALT SERPL-CCNC: 54 U/L (ref 5–33)
AMORPHOUS: ABNORMAL
AMORPHOUS: NORMAL
ANION GAP SERPL CALCULATED.3IONS-SCNC: 10 MMOL/L (ref 9–17)
ANION GAP SERPL CALCULATED.3IONS-SCNC: 11 MMOL/L (ref 9–17)
ANION GAP SERPL CALCULATED.3IONS-SCNC: 12 MMOL/L (ref 9–17)
ANION GAP SERPL CALCULATED.3IONS-SCNC: 13 MMOL/L (ref 9–17)
ANION GAP SERPL CALCULATED.3IONS-SCNC: 14 MMOL/L (ref 9–17)
ANION GAP SERPL CALCULATED.3IONS-SCNC: 15 MMOL/L (ref 9–17)
ANION GAP SERPL CALCULATED.3IONS-SCNC: 16 MMOL/L (ref 9–17)
ANION GAP SERPL CALCULATED.3IONS-SCNC: 16 MMOL/L (ref 9–17)
ANION GAP SERPL CALCULATED.3IONS-SCNC: 17 MMOL/L (ref 9–17)
ANION GAP SERPL CALCULATED.3IONS-SCNC: 18 MMOL/L (ref 9–17)
ANION GAP SERPL CALCULATED.3IONS-SCNC: 8 MMOL/L (ref 9–17)
ANTI-NUCLEAR ANTIBODY (ANA): NEGATIVE
AST SERPL-CCNC: 17 U/L
AST SERPL-CCNC: 287 U/L
AST SERPL-CCNC: 296 U/L
AST SERPL-CCNC: 317 U/L
AST SERPL-CCNC: 327 U/L
AST SERPL-CCNC: 39 U/L
AST SERPL-CCNC: 398 U/L
AST SERPL-CCNC: 406 U/L
AST SERPL-CCNC: 54 U/L
BACTERIA: ABNORMAL
BACTERIA: NORMAL
BASOPHILS # BLD: 0 % (ref 0–2)
BASOPHILS # BLD: 0 % (ref 0–2)
BASOPHILS ABSOLUTE: 0 K/UL (ref 0–0.2)
BASOPHILS ABSOLUTE: 0 K/UL (ref 0–0.2)
BILIRUB SERPL-MCNC: 0.29 MG/DL (ref 0.3–1.2)
BILIRUB SERPL-MCNC: 0.42 MG/DL (ref 0.3–1.2)
BILIRUB SERPL-MCNC: 0.47 MG/DL (ref 0.3–1.2)
BILIRUB SERPL-MCNC: 10.13 MG/DL (ref 0.3–1.2)
BILIRUB SERPL-MCNC: 11.49 MG/DL (ref 0.3–1.2)
BILIRUB SERPL-MCNC: 12.22 MG/DL (ref 0.3–1.2)
BILIRUB SERPL-MCNC: 7.86 MG/DL (ref 0.3–1.2)
BILIRUB SERPL-MCNC: 8.19 MG/DL (ref 0.3–1.2)
BILIRUB SERPL-MCNC: 9.81 MG/DL (ref 0.3–1.2)
BILIRUBIN DIRECT: 7.03 MG/DL
BILIRUBIN URINE: NEGATIVE
BILIRUBIN URINE: NEGATIVE
BILIRUBIN, INDIRECT: 1.16 MG/DL (ref 0–1)
BUN BLDV-MCNC: 10 MG/DL (ref 8–23)
BUN BLDV-MCNC: 12 MG/DL (ref 8–23)
BUN BLDV-MCNC: 13 MG/DL (ref 8–23)
BUN BLDV-MCNC: 14 MG/DL (ref 8–23)
BUN BLDV-MCNC: 14 MG/DL (ref 8–23)
BUN BLDV-MCNC: 17 MG/DL (ref 8–23)
BUN BLDV-MCNC: 3 MG/DL (ref 8–23)
BUN BLDV-MCNC: 3 MG/DL (ref 8–23)
BUN BLDV-MCNC: 32 MG/DL (ref 8–23)
BUN BLDV-MCNC: 33 MG/DL (ref 8–23)
BUN BLDV-MCNC: 34 MG/DL (ref 8–23)
BUN BLDV-MCNC: 36 MG/DL (ref 8–23)
BUN BLDV-MCNC: 4 MG/DL (ref 8–23)
BUN BLDV-MCNC: 46 MG/DL (ref 8–23)
BUN BLDV-MCNC: 5 MG/DL (ref 8–23)
BUN BLDV-MCNC: 56 MG/DL (ref 8–23)
BUN BLDV-MCNC: 6 MG/DL (ref 8–23)
BUN BLDV-MCNC: 63 MG/DL (ref 8–23)
BUN BLDV-MCNC: 7 MG/DL (ref 8–23)
BUN BLDV-MCNC: 8 MG/DL (ref 8–23)
BUN BLDV-MCNC: 8 MG/DL (ref 8–23)
BUN BLDV-MCNC: 9 MG/DL (ref 8–23)
BUN/CREAT BLD: ABNORMAL (ref 9–20)
CALCIUM SERPL-MCNC: 7.1 MG/DL (ref 8.6–10.4)
CALCIUM SERPL-MCNC: 7.7 MG/DL (ref 8.6–10.4)
CALCIUM SERPL-MCNC: 8 MG/DL (ref 8.6–10.4)
CALCIUM SERPL-MCNC: 8.5 MG/DL (ref 8.6–10.4)
CALCIUM SERPL-MCNC: 8.6 MG/DL (ref 8.6–10.4)
CALCIUM SERPL-MCNC: 8.7 MG/DL (ref 8.6–10.4)
CALCIUM SERPL-MCNC: 8.8 MG/DL (ref 8.6–10.4)
CALCIUM SERPL-MCNC: 8.8 MG/DL (ref 8.6–10.4)
CALCIUM SERPL-MCNC: 8.9 MG/DL (ref 8.6–10.4)
CALCIUM SERPL-MCNC: 9 MG/DL (ref 8.6–10.4)
CALCIUM SERPL-MCNC: 9.2 MG/DL (ref 8.6–10.4)
CALCIUM SERPL-MCNC: 9.3 MG/DL (ref 8.6–10.4)
CALCIUM SERPL-MCNC: 9.4 MG/DL (ref 8.6–10.4)
CALCIUM SERPL-MCNC: 9.5 MG/DL (ref 8.6–10.4)
CALCIUM SERPL-MCNC: 9.5 MG/DL (ref 8.6–10.4)
CALCIUM SERPL-MCNC: 9.6 MG/DL (ref 8.6–10.4)
CASTS UA: ABNORMAL /LPF
CASTS UA: NORMAL /LPF
CERULOPLASMIN: 37 MG/DL (ref 16–45)
CHLORIDE BLD-SCNC: 100 MMOL/L (ref 98–107)
CHLORIDE BLD-SCNC: 100 MMOL/L (ref 98–107)
CHLORIDE BLD-SCNC: 103 MMOL/L (ref 98–107)
CHLORIDE BLD-SCNC: 83 MMOL/L (ref 98–107)
CHLORIDE BLD-SCNC: 85 MMOL/L (ref 98–107)
CHLORIDE BLD-SCNC: 86 MMOL/L (ref 98–107)
CHLORIDE BLD-SCNC: 89 MMOL/L (ref 98–107)
CHLORIDE BLD-SCNC: 91 MMOL/L (ref 98–107)
CHLORIDE BLD-SCNC: 94 MMOL/L (ref 98–107)
CHLORIDE BLD-SCNC: 95 MMOL/L (ref 98–107)
CHLORIDE BLD-SCNC: 96 MMOL/L (ref 98–107)
CHLORIDE BLD-SCNC: 97 MMOL/L (ref 98–107)
CHLORIDE BLD-SCNC: 97 MMOL/L (ref 98–107)
CHLORIDE BLD-SCNC: 98 MMOL/L (ref 98–107)
CHLORIDE BLD-SCNC: 98 MMOL/L (ref 98–107)
CHLORIDE BLD-SCNC: 99 MMOL/L (ref 98–107)
CHOLESTEROL/HDL RATIO: 4.1
CHOLESTEROL: 155 MG/DL
CMV IGM: 0.3
CO2: 13 MMOL/L (ref 20–31)
CO2: 16 MMOL/L (ref 20–31)
CO2: 17 MMOL/L (ref 20–31)
CO2: 18 MMOL/L (ref 20–31)
CO2: 19 MMOL/L (ref 20–31)
CO2: 20 MMOL/L (ref 20–31)
CO2: 20 MMOL/L (ref 20–31)
CO2: 21 MMOL/L (ref 20–31)
CO2: 22 MMOL/L (ref 20–31)
CO2: 22 MMOL/L (ref 20–31)
CO2: 23 MMOL/L (ref 20–31)
COLOR: ABNORMAL
COLOR: YELLOW
COMMENT UA: ABNORMAL
COMMENT UA: ABNORMAL
CREAT SERPL-MCNC: 0.53 MG/DL (ref 0.5–0.9)
CREAT SERPL-MCNC: 0.54 MG/DL (ref 0.5–0.9)
CREAT SERPL-MCNC: 0.56 MG/DL (ref 0.5–0.9)
CREAT SERPL-MCNC: 0.57 MG/DL (ref 0.5–0.9)
CREAT SERPL-MCNC: 0.58 MG/DL (ref 0.5–0.9)
CREAT SERPL-MCNC: 0.59 MG/DL (ref 0.5–0.9)
CREAT SERPL-MCNC: 0.62 MG/DL (ref 0.5–0.9)
CREAT SERPL-MCNC: 0.62 MG/DL (ref 0.5–0.9)
CREAT SERPL-MCNC: 0.65 MG/DL (ref 0.5–0.9)
CREAT SERPL-MCNC: 0.66 MG/DL (ref 0.5–0.9)
CREAT SERPL-MCNC: 0.67 MG/DL (ref 0.5–0.9)
CREAT SERPL-MCNC: 0.69 MG/DL (ref 0.5–0.9)
CREAT SERPL-MCNC: 0.74 MG/DL (ref 0.5–0.9)
CREAT SERPL-MCNC: 0.75 MG/DL (ref 0.5–0.9)
CREAT SERPL-MCNC: 0.77 MG/DL (ref 0.5–0.9)
CREAT SERPL-MCNC: 0.77 MG/DL (ref 0.5–0.9)
CREAT SERPL-MCNC: 0.79 MG/DL (ref 0.5–0.9)
CREAT SERPL-MCNC: 0.8 MG/DL (ref 0.5–0.9)
CREAT SERPL-MCNC: 0.83 MG/DL (ref 0.5–0.9)
CREAT SERPL-MCNC: 0.96 MG/DL (ref 0.5–0.9)
CREAT SERPL-MCNC: 1.04 MG/DL (ref 0.5–0.9)
CREAT SERPL-MCNC: 1.06 MG/DL (ref 0.5–0.9)
CREAT SERPL-MCNC: 1.11 MG/DL (ref 0.5–0.9)
CREAT SERPL-MCNC: 1.29 MG/DL (ref 0.5–0.9)
CREAT SERPL-MCNC: 1.34 MG/DL (ref 0.5–0.9)
CREAT SERPL-MCNC: 1.4 MG/DL (ref 0.5–0.9)
CREAT SERPL-MCNC: 1.4 MG/DL (ref 0.5–0.9)
CRYSTALS, UA: ABNORMAL /HPF
CRYSTALS, UA: ABNORMAL /HPF
CRYSTALS, UA: NORMAL /HPF
CULTURE: NO GROWTH
CYTOMEGALOVIRUS IGG ANTIBODY: 0.1
DIFFERENTIAL TYPE: ABNORMAL
DIFFERENTIAL TYPE: ABNORMAL
EBV EARLY ANTIGEN IGG: 31 U/ML
EBV INTERPRETATION: ABNORMAL
EBV NUCLEAR AG AB: 755 U/ML
EOSINOPHILS RELATIVE PERCENT: 0 % (ref 0–4)
EOSINOPHILS RELATIVE PERCENT: 0 % (ref 0–4)
EPITHELIAL CELLS UA: ABNORMAL /HPF
EPITHELIAL CELLS UA: NORMAL /HPF
EPSTEIN-BARR VCA IGG: 631 U/ML
EPSTEIN-BARR VCA IGM: 17 U/ML
FERRITIN: 474 UG/L (ref 13–150)
FOLATE: 7.5 NG/ML
GFR AFRICAN AMERICAN: 46 ML/MIN
GFR AFRICAN AMERICAN: 46 ML/MIN
GFR AFRICAN AMERICAN: 48 ML/MIN
GFR AFRICAN AMERICAN: 50 ML/MIN
GFR AFRICAN AMERICAN: >60 ML/MIN
GFR NON-AFRICAN AMERICAN: 38 ML/MIN
GFR NON-AFRICAN AMERICAN: 38 ML/MIN
GFR NON-AFRICAN AMERICAN: 40 ML/MIN
GFR NON-AFRICAN AMERICAN: 42 ML/MIN
GFR NON-AFRICAN AMERICAN: 50 ML/MIN
GFR NON-AFRICAN AMERICAN: 52 ML/MIN
GFR NON-AFRICAN AMERICAN: 53 ML/MIN
GFR NON-AFRICAN AMERICAN: 59 ML/MIN
GFR NON-AFRICAN AMERICAN: >60 ML/MIN
GFR SERPL CREATININE-BSD FRML MDRD: ABNORMAL ML/MIN/{1.73_M2}
GGT: 2344 U/L (ref 5–36)
GLOBULIN: ABNORMAL G/DL (ref 1.5–3.8)
GLUCOSE BLD-MCNC: 103 MG/DL (ref 70–99)
GLUCOSE BLD-MCNC: 103 MG/DL (ref 70–99)
GLUCOSE BLD-MCNC: 112 MG/DL (ref 70–99)
GLUCOSE BLD-MCNC: 114 MG/DL (ref 70–99)
GLUCOSE BLD-MCNC: 118 MG/DL (ref 70–99)
GLUCOSE BLD-MCNC: 135 MG/DL (ref 70–99)
GLUCOSE BLD-MCNC: 147 MG/DL (ref 70–99)
GLUCOSE BLD-MCNC: 153 MG/DL (ref 70–99)
GLUCOSE BLD-MCNC: 156 MG/DL (ref 70–99)
GLUCOSE BLD-MCNC: 156 MG/DL (ref 70–99)
GLUCOSE BLD-MCNC: 159 MG/DL (ref 70–99)
GLUCOSE BLD-MCNC: 164 MG/DL (ref 70–99)
GLUCOSE BLD-MCNC: 167 MG/DL (ref 70–99)
GLUCOSE BLD-MCNC: 167 MG/DL (ref 70–99)
GLUCOSE BLD-MCNC: 173 MG/DL (ref 70–99)
GLUCOSE BLD-MCNC: 178 MG/DL (ref 70–99)
GLUCOSE BLD-MCNC: 179 MG/DL (ref 70–99)
GLUCOSE BLD-MCNC: 181 MG/DL (ref 70–99)
GLUCOSE BLD-MCNC: 197 MG/DL (ref 70–99)
GLUCOSE BLD-MCNC: 219 MG/DL (ref 70–99)
GLUCOSE BLD-MCNC: 81 MG/DL (ref 70–99)
GLUCOSE BLD-MCNC: 84 MG/DL (ref 70–99)
GLUCOSE BLD-MCNC: 89 MG/DL (ref 70–99)
GLUCOSE BLD-MCNC: 90 MG/DL (ref 70–99)
GLUCOSE BLD-MCNC: 93 MG/DL (ref 70–99)
GLUCOSE BLD-MCNC: 94 MG/DL (ref 70–99)
GLUCOSE BLD-MCNC: 95 MG/DL (ref 70–99)
GLUCOSE BLD-MCNC: 98 MG/DL (ref 70–99)
GLUCOSE BLD-MCNC: 99 MG/DL (ref 70–99)
GLUCOSE URINE: NEGATIVE
GLUCOSE URINE: NEGATIVE
HAV IGM SER IA-ACNC: NONREACTIVE
HCT VFR BLD CALC: 31.5 % (ref 36–46)
HCT VFR BLD CALC: 31.6 % (ref 36–46)
HCT VFR BLD CALC: 32 % (ref 36–46)
HCT VFR BLD CALC: 33.3 % (ref 36–46)
HCT VFR BLD CALC: 33.7 % (ref 36–46)
HCT VFR BLD CALC: 33.7 % (ref 36–46)
HCT VFR BLD CALC: 34.9 % (ref 36–46)
HCT VFR BLD CALC: 36.3 % (ref 36–46)
HCT VFR BLD CALC: 36.5 % (ref 36–46)
HCT VFR BLD CALC: 42.9 % (ref 36–46)
HDLC SERPL-MCNC: 38 MG/DL
HEMOGLOBIN: 10.2 G/DL (ref 12–16)
HEMOGLOBIN: 10.3 G/DL (ref 12–16)
HEMOGLOBIN: 10.4 G/DL (ref 12–16)
HEMOGLOBIN: 10.5 G/DL (ref 12–16)
HEMOGLOBIN: 10.9 G/DL (ref 12–16)
HEMOGLOBIN: 11 G/DL (ref 12–16)
HEMOGLOBIN: 11.4 G/DL (ref 12–16)
HEMOGLOBIN: 12.1 G/DL (ref 12–16)
HEMOGLOBIN: 12.2 G/DL (ref 12–16)
HEMOGLOBIN: 14.1 G/DL (ref 12–16)
HEPATITIS B CORE IGM ANTIBODY: NONREACTIVE
HEPATITIS B SURFACE ANTIGEN: NONREACTIVE
HEPATITIS C ANTIBODY: NONREACTIVE
IGA: 312 MG/DL (ref 70–400)
IMMATURE GRANULOCYTES: ABNORMAL %
IMMATURE GRANULOCYTES: ABNORMAL %
INR BLD: 1.2
INR BLD: 1.2
INR BLD: 1.9
INR BLD: 2
IRON SATURATION: 34 % (ref 20–55)
IRON: 96 UG/DL (ref 37–145)
KETONES, URINE: ABNORMAL
KETONES, URINE: ABNORMAL
LACTIC ACID: 1 MMOL/L (ref 0.5–2.2)
LDL CHOLESTEROL: 85 MG/DL (ref 0–130)
LEUKOCYTE ESTERASE, URINE: ABNORMAL
LEUKOCYTE ESTERASE, URINE: NEGATIVE
LIPASE: 37 U/L (ref 13–60)
LIVER-KIDNEY MICROSOMAL AB: NORMAL
LV EF: 68 %
LVEF MODALITY: NORMAL
LYMPHOCYTES # BLD: 12 % (ref 24–44)
LYMPHOCYTES # BLD: 9 % (ref 24–44)
Lab: NORMAL
MAGNESIUM: 1.8 MG/DL (ref 1.6–2.6)
MCH RBC QN AUTO: 27.6 PG (ref 26–34)
MCH RBC QN AUTO: 27.6 PG (ref 26–34)
MCH RBC QN AUTO: 27.7 PG (ref 26–34)
MCH RBC QN AUTO: 27.9 PG (ref 26–34)
MCH RBC QN AUTO: 28.1 PG (ref 26–34)
MCH RBC QN AUTO: 28.1 PG (ref 26–34)
MCH RBC QN AUTO: 28.2 PG (ref 26–34)
MCH RBC QN AUTO: 28.2 PG (ref 26–34)
MCH RBC QN AUTO: 28.5 PG (ref 26–34)
MCH RBC QN AUTO: 29.1 PG (ref 26–34)
MCHC RBC AUTO-ENTMCNC: 31.1 G/DL (ref 31–37)
MCHC RBC AUTO-ENTMCNC: 32.3 G/DL (ref 31–37)
MCHC RBC AUTO-ENTMCNC: 32.5 G/DL (ref 31–37)
MCHC RBC AUTO-ENTMCNC: 32.7 G/DL (ref 31–37)
MCHC RBC AUTO-ENTMCNC: 33 G/DL (ref 31–37)
MCHC RBC AUTO-ENTMCNC: 33.1 G/DL (ref 31–37)
MCHC RBC AUTO-ENTMCNC: 33.3 G/DL (ref 31–37)
MCHC RBC AUTO-ENTMCNC: 33.5 G/DL (ref 31–37)
MCV RBC AUTO: 84.3 FL (ref 80–100)
MCV RBC AUTO: 84.6 FL (ref 80–100)
MCV RBC AUTO: 84.7 FL (ref 80–100)
MCV RBC AUTO: 85 FL (ref 80–100)
MCV RBC AUTO: 85.4 FL (ref 80–100)
MCV RBC AUTO: 86.2 FL (ref 80–100)
MCV RBC AUTO: 86.6 FL (ref 80–100)
MCV RBC AUTO: 86.6 FL (ref 80–100)
MCV RBC AUTO: 88.1 FL (ref 80–100)
MCV RBC AUTO: 89.7 FL (ref 80–100)
MITOCHONDRIAL ANTIBODY: 2.4 UNITS (ref 0–20)
MONOCYTES # BLD: 9 % (ref 1–7)
MONOCYTES # BLD: 9 % (ref 1–7)
MORPHOLOGY: ABNORMAL
MORPHOLOGY: ABNORMAL
MRSA, DNA, NASAL: NORMAL
MUCUS: ABNORMAL
MUCUS: NORMAL
NITRITE, URINE: NEGATIVE
NITRITE, URINE: POSITIVE
NRBC AUTOMATED: ABNORMAL PER 100 WBC
OSMOLALITY URINE: 367 MOSM/KG (ref 80–1300)
OTHER OBSERVATIONS UA: ABNORMAL
OTHER OBSERVATIONS UA: NORMAL
PARTIAL THROMBOPLASTIN TIME: 25.5 SEC (ref 24–36)
PARTIAL THROMBOPLASTIN TIME: 27 SEC (ref 24–36)
PDW BLD-RTO: 15.3 % (ref 11.5–14.9)
PDW BLD-RTO: 15.3 % (ref 11.5–14.9)
PDW BLD-RTO: 15.4 % (ref 11.5–14.9)
PDW BLD-RTO: 17.2 % (ref 11.5–14.9)
PDW BLD-RTO: 17.5 % (ref 11.5–14.9)
PDW BLD-RTO: 17.6 % (ref 11.5–14.9)
PDW BLD-RTO: 17.7 % (ref 11.5–14.9)
PDW BLD-RTO: 18.1 % (ref 11.5–14.9)
PDW BLD-RTO: 18.6 % (ref 11.5–14.9)
PDW BLD-RTO: 18.7 % (ref 11.5–14.9)
PH UA: 6 (ref 5–8)
PH UA: 7 (ref 5–8)
PLATELET # BLD: 182 K/UL (ref 150–450)
PLATELET # BLD: 184 K/UL (ref 150–450)
PLATELET # BLD: 190 K/UL (ref 150–450)
PLATELET # BLD: 194 K/UL (ref 150–450)
PLATELET # BLD: 196 K/UL (ref 150–450)
PLATELET # BLD: 199 K/UL (ref 150–450)
PLATELET # BLD: 203 K/UL (ref 150–450)
PLATELET # BLD: 215 K/UL (ref 150–450)
PLATELET # BLD: 224 K/UL (ref 150–450)
PLATELET # BLD: 225 K/UL (ref 150–450)
PLATELET # BLD: 233 K/UL (ref 150–450)
PLATELET # BLD: 264 K/UL (ref 150–450)
PLATELET ESTIMATE: ABNORMAL
PLATELET ESTIMATE: ABNORMAL
PMV BLD AUTO: 7.5 FL (ref 6–12)
PMV BLD AUTO: 7.7 FL (ref 6–12)
PMV BLD AUTO: 7.8 FL (ref 6–12)
PMV BLD AUTO: 8.4 FL (ref 6–12)
PMV BLD AUTO: 8.5 FL (ref 6–12)
PMV BLD AUTO: 8.6 FL (ref 6–12)
PMV BLD AUTO: 8.7 FL (ref 6–12)
PMV BLD AUTO: 8.7 FL (ref 6–12)
PMV BLD AUTO: 9 FL (ref 6–12)
PMV BLD AUTO: 9.1 FL (ref 6–12)
POTASSIUM SERPL-SCNC: 3.1 MMOL/L (ref 3.7–5.3)
POTASSIUM SERPL-SCNC: 3.5 MMOL/L (ref 3.7–5.3)
POTASSIUM SERPL-SCNC: 3.6 MMOL/L (ref 3.7–5.3)
POTASSIUM SERPL-SCNC: 3.8 MMOL/L (ref 3.7–5.3)
POTASSIUM SERPL-SCNC: 3.9 MMOL/L (ref 3.7–5.3)
POTASSIUM SERPL-SCNC: 4 MMOL/L (ref 3.7–5.3)
POTASSIUM SERPL-SCNC: 4 MMOL/L (ref 3.7–5.3)
POTASSIUM SERPL-SCNC: 4.1 MMOL/L (ref 3.7–5.3)
POTASSIUM SERPL-SCNC: 4.2 MMOL/L (ref 3.7–5.3)
POTASSIUM SERPL-SCNC: 4.2 MMOL/L (ref 3.7–5.3)
POTASSIUM SERPL-SCNC: 4.4 MMOL/L (ref 3.7–5.3)
POTASSIUM SERPL-SCNC: 4.6 MMOL/L (ref 3.7–5.3)
POTASSIUM SERPL-SCNC: 4.6 MMOL/L (ref 3.7–5.3)
POTASSIUM SERPL-SCNC: 5.1 MMOL/L (ref 3.7–5.3)
PROTEIN UA: ABNORMAL
PROTEIN UA: NEGATIVE
PROTHROMBIN TIME: 14.8 SEC (ref 11.8–14.6)
PROTHROMBIN TIME: 15.5 SEC (ref 11.8–14.6)
PROTHROMBIN TIME: 21.9 SEC (ref 11.8–14.6)
PROTHROMBIN TIME: 22.7 SEC (ref 11.8–14.6)
RBC # BLD: 3.65 M/UL (ref 4–5.2)
RBC # BLD: 3.69 M/UL (ref 4–5.2)
RBC # BLD: 3.7 M/UL (ref 4–5.2)
RBC # BLD: 3.76 M/UL (ref 4–5.2)
RBC # BLD: 3.86 M/UL (ref 4–5.2)
RBC # BLD: 3.94 M/UL (ref 4–5.2)
RBC # BLD: 4.12 M/UL (ref 4–5.2)
RBC # BLD: 4.29 M/UL (ref 4–5.2)
RBC # BLD: 4.33 M/UL (ref 4–5.2)
RBC # BLD: 4.87 M/UL (ref 4–5.2)
RBC # BLD: ABNORMAL 10*6/UL
RBC # BLD: ABNORMAL 10*6/UL
RBC UA: ABNORMAL /HPF
RBC UA: NORMAL /HPF
REASON FOR REJECTION: NORMAL
REASON FOR REJECTION: NORMAL
RENAL EPITHELIAL, UA: ABNORMAL /HPF
RENAL EPITHELIAL, UA: NORMAL /HPF
SEG NEUTROPHILS: 79 % (ref 36–66)
SEG NEUTROPHILS: 82 % (ref 36–66)
SEGMENTED NEUTROPHILS ABSOLUTE COUNT: 11.64 K/UL (ref 1.3–9.1)
SEGMENTED NEUTROPHILS ABSOLUTE COUNT: 6.6 K/UL (ref 1.3–9.1)
SERUM OSMOLALITY: 246 MOSM/KG (ref 275–295)
SMOOTH MUSCLE ANTIBODY: 5 UNITS (ref 0–19)
SODIUM BLD-SCNC: 116 MMOL/L (ref 135–144)
SODIUM BLD-SCNC: 116 MMOL/L (ref 135–144)
SODIUM BLD-SCNC: 117 MMOL/L (ref 135–144)
SODIUM BLD-SCNC: 118 MMOL/L (ref 135–144)
SODIUM BLD-SCNC: 119 MMOL/L (ref 135–144)
SODIUM BLD-SCNC: 120 MMOL/L (ref 135–144)
SODIUM BLD-SCNC: 120 MMOL/L (ref 135–144)
SODIUM BLD-SCNC: 122 MMOL/L (ref 135–144)
SODIUM BLD-SCNC: 125 MMOL/L (ref 135–144)
SODIUM BLD-SCNC: 126 MMOL/L (ref 135–144)
SODIUM BLD-SCNC: 126 MMOL/L (ref 135–144)
SODIUM BLD-SCNC: 127 MMOL/L (ref 135–144)
SODIUM BLD-SCNC: 128 MMOL/L (ref 135–144)
SODIUM BLD-SCNC: 129 MMOL/L (ref 135–144)
SODIUM BLD-SCNC: 130 MMOL/L (ref 135–144)
SODIUM BLD-SCNC: 131 MMOL/L (ref 135–144)
SODIUM BLD-SCNC: 132 MMOL/L (ref 135–144)
SODIUM BLD-SCNC: 134 MMOL/L (ref 135–144)
SODIUM BLD-SCNC: 138 MMOL/L (ref 135–144)
SODIUM,UR: 102 MMOL/L
SPECIFIC GRAVITY UA: 1.01 (ref 1–1.03)
SPECIFIC GRAVITY UA: 1.02 (ref 1–1.03)
SPECIMEN DESCRIPTION: NORMAL
SPECIMEN DESCRIPTION: NORMAL
SURGICAL PATHOLOGY REPORT: NORMAL
TOTAL IRON BINDING CAPACITY: 279 UG/DL (ref 250–450)
TOTAL PROTEIN: 5.5 G/DL (ref 6.4–8.3)
TOTAL PROTEIN: 5.7 G/DL (ref 6.4–8.3)
TOTAL PROTEIN: 5.8 G/DL (ref 6.4–8.3)
TOTAL PROTEIN: 5.9 G/DL (ref 6.4–8.3)
TOTAL PROTEIN: 6.3 G/DL (ref 6.4–8.3)
TOTAL PROTEIN: 6.6 G/DL (ref 6.4–8.3)
TOTAL PROTEIN: 6.7 G/DL (ref 6.4–8.3)
TOTAL PROTEIN: 6.9 G/DL (ref 6.4–8.3)
TOTAL PROTEIN: 7.6 G/DL (ref 6.4–8.3)
TRICHOMONAS: ABNORMAL
TRICHOMONAS: NORMAL
TRIGL SERPL-MCNC: 162 MG/DL
TROPONIN INTERP: NORMAL
TROPONIN INTERP: NORMAL
TROPONIN T: NORMAL NG/ML
TROPONIN T: NORMAL NG/ML
TROPONIN, HIGH SENSITIVITY: 11 NG/L (ref 0–14)
TROPONIN, HIGH SENSITIVITY: 12 NG/L (ref 0–14)
TURBIDITY: ABNORMAL
TURBIDITY: CLEAR
UNSATURATED IRON BINDING CAPACITY: 183 UG/DL (ref 112–347)
URINE HGB: ABNORMAL
URINE HGB: ABNORMAL
UROBILINOGEN, URINE: NORMAL
UROBILINOGEN, URINE: NORMAL
VANCOMYCIN TROUGH DATE LAST DOSE: ABNORMAL
VANCOMYCIN TROUGH DATE LAST DOSE: NORMAL
VANCOMYCIN TROUGH DOSE AMOUNT: ABNORMAL
VANCOMYCIN TROUGH DOSE AMOUNT: NORMAL
VANCOMYCIN TROUGH TIME LAST DOSE: 208
VANCOMYCIN TROUGH TIME LAST DOSE: 537
VANCOMYCIN TROUGH: 13.7 UG/ML (ref 10–20)
VANCOMYCIN TROUGH: 22.3 UG/ML (ref 10–20)
VITAMIN B-12: 1262 PG/ML (ref 232–1245)
VLDLC SERPL CALC-MCNC: ABNORMAL MG/DL (ref 1–30)
WBC # BLD: 11.4 K/UL (ref 3.5–11)
WBC # BLD: 13.9 K/UL (ref 3.5–11)
WBC # BLD: 14.2 K/UL (ref 3.5–11)
WBC # BLD: 14.5 K/UL (ref 3.5–11)
WBC # BLD: 17.7 K/UL (ref 3.5–11)
WBC # BLD: 18.2 K/UL (ref 3.5–11)
WBC # BLD: 18.5 K/UL (ref 3.5–11)
WBC # BLD: 18.6 K/UL (ref 3.5–11)
WBC # BLD: 8.4 K/UL (ref 3.5–11)
WBC # BLD: 8.8 K/UL (ref 3.5–11)
WBC # BLD: ABNORMAL 10*3/UL
WBC # BLD: ABNORMAL 10*3/UL
WBC UA: ABNORMAL /HPF
WBC UA: NORMAL /HPF
YEAST: ABNORMAL
YEAST: NORMAL
ZZ NTE CLEAN UP: ORDERED TEST: NORMAL
ZZ NTE CLEAN UP: ORDERED TEST: NORMAL
ZZ NTE WITH NAME CLEAN UP: SPECIMEN SOURCE: NORMAL
ZZ NTE WITH NAME CLEAN UP: SPECIMEN SOURCE: NORMAL

## 2019-01-01 PROCEDURE — 85610 PROTHROMBIN TIME: CPT

## 2019-01-01 PROCEDURE — 80053 COMPREHEN METABOLIC PANEL: CPT

## 2019-01-01 PROCEDURE — 6370000000 HC RX 637 (ALT 250 FOR IP): Performed by: INTERNAL MEDICINE

## 2019-01-01 PROCEDURE — 6360000002 HC RX W HCPCS: Performed by: INTERNAL MEDICINE

## 2019-01-01 PROCEDURE — 7100000001 HC PACU RECOVERY - ADDTL 15 MIN: Performed by: INTERNAL MEDICINE

## 2019-01-01 PROCEDURE — 6370000000 HC RX 637 (ALT 250 FOR IP): Performed by: ANESTHESIOLOGY

## 2019-01-01 PROCEDURE — 2709999900 IR PORT PLACEMENT > 5 YEARS

## 2019-01-01 PROCEDURE — 6360000004 HC RX CONTRAST MEDICATION: Performed by: INTERNAL MEDICINE

## 2019-01-01 PROCEDURE — 83735 ASSAY OF MAGNESIUM: CPT

## 2019-01-01 PROCEDURE — 36415 COLL VENOUS BLD VENIPUNCTURE: CPT

## 2019-01-01 PROCEDURE — 94640 AIRWAY INHALATION TREATMENT: CPT

## 2019-01-01 PROCEDURE — 87040 BLOOD CULTURE FOR BACTERIA: CPT

## 2019-01-01 PROCEDURE — 88333 PATH CONSLTJ SURG CYTO XM 1: CPT

## 2019-01-01 PROCEDURE — 74328 X-RAY BILE DUCT ENDOSCOPY: CPT | Performed by: INTERNAL MEDICINE

## 2019-01-01 PROCEDURE — A9579 GAD-BASE MR CONTRAST NOS,1ML: HCPCS | Performed by: INTERNAL MEDICINE

## 2019-01-01 PROCEDURE — 1800000000 HC LEAVE OF ABSENCE

## 2019-01-01 PROCEDURE — 99215 OFFICE O/P EST HI 40 MIN: CPT | Performed by: INTERNAL MEDICINE

## 2019-01-01 PROCEDURE — 2500000003 HC RX 250 WO HCPCS: Performed by: RADIOLOGY

## 2019-01-01 PROCEDURE — 2580000003 HC RX 258: Performed by: INTERNAL MEDICINE

## 2019-01-01 PROCEDURE — 83930 ASSAY OF BLOOD OSMOLALITY: CPT

## 2019-01-01 PROCEDURE — 97530 THERAPEUTIC ACTIVITIES: CPT

## 2019-01-01 PROCEDURE — 71250 CT THORAX DX C-: CPT

## 2019-01-01 PROCEDURE — 2060000000 HC ICU INTERMEDIATE R&B

## 2019-01-01 PROCEDURE — 96368 THER/DIAG CONCURRENT INF: CPT

## 2019-01-01 PROCEDURE — 94761 N-INVAS EAR/PLS OXIMETRY MLT: CPT

## 2019-01-01 PROCEDURE — 80048 BASIC METABOLIC PNL TOTAL CA: CPT

## 2019-01-01 PROCEDURE — 7100000031 HC ASPR PHASE II RECOVERY - ADDTL 15 MIN

## 2019-01-01 PROCEDURE — 88342 IMHCHEM/IMCYTCHM 1ST ANTB: CPT

## 2019-01-01 PROCEDURE — 6360000002 HC RX W HCPCS: Performed by: NURSE PRACTITIONER

## 2019-01-01 PROCEDURE — 76770 US EXAM ABDO BACK WALL COMP: CPT

## 2019-01-01 PROCEDURE — 74177 CT ABD & PELVIS W/CONTRAST: CPT

## 2019-01-01 PROCEDURE — 97116 GAIT TRAINING THERAPY: CPT

## 2019-01-01 PROCEDURE — 6370000000 HC RX 637 (ALT 250 FOR IP): Performed by: EMERGENCY MEDICINE

## 2019-01-01 PROCEDURE — 96376 TX/PRO/DX INJ SAME DRUG ADON: CPT

## 2019-01-01 PROCEDURE — 99239 HOSP IP/OBS DSCHRG MGMT >30: CPT | Performed by: INTERNAL MEDICINE

## 2019-01-01 PROCEDURE — 99232 SBSQ HOSP IP/OBS MODERATE 35: CPT | Performed by: INTERNAL MEDICINE

## 2019-01-01 PROCEDURE — 99233 SBSQ HOSP IP/OBS HIGH 50: CPT | Performed by: INTERNAL MEDICINE

## 2019-01-01 PROCEDURE — 84295 ASSAY OF SERUM SODIUM: CPT

## 2019-01-01 PROCEDURE — 85025 COMPLETE CBC W/AUTO DIFF WBC: CPT

## 2019-01-01 PROCEDURE — 86663 EPSTEIN-BARR ANTIBODY: CPT

## 2019-01-01 PROCEDURE — 97166 OT EVAL MOD COMPLEX 45 MIN: CPT

## 2019-01-01 PROCEDURE — 6360000002 HC RX W HCPCS

## 2019-01-01 PROCEDURE — 84300 ASSAY OF URINE SODIUM: CPT

## 2019-01-01 PROCEDURE — 6370000000 HC RX 637 (ALT 250 FOR IP): Performed by: NURSE PRACTITIONER

## 2019-01-01 PROCEDURE — 96365 THER/PROPH/DIAG IV INF INIT: CPT

## 2019-01-01 PROCEDURE — 85027 COMPLETE CBC AUTOMATED: CPT

## 2019-01-01 PROCEDURE — 97110 THERAPEUTIC EXERCISES: CPT

## 2019-01-01 PROCEDURE — 83935 ASSAY OF URINE OSMOLALITY: CPT

## 2019-01-01 PROCEDURE — 87641 MR-STAPH DNA AMP PROBE: CPT

## 2019-01-01 PROCEDURE — 83690 ASSAY OF LIPASE: CPT

## 2019-01-01 PROCEDURE — 1200000000 HC SEMI PRIVATE

## 2019-01-01 PROCEDURE — 82105 ALPHA-FETOPROTEIN SERUM: CPT

## 2019-01-01 PROCEDURE — 94660 CPAP INITIATION&MGMT: CPT

## 2019-01-01 PROCEDURE — 74181 MRI ABDOMEN W/O CONTRAST: CPT

## 2019-01-01 PROCEDURE — 2709999900 CT NEEDLE BIOPSY LUNG PERCUTANEOUS

## 2019-01-01 PROCEDURE — 94664 DEMO&/EVAL PT USE INHALER: CPT

## 2019-01-01 PROCEDURE — 71046 X-RAY EXAM CHEST 2 VIEWS: CPT

## 2019-01-01 PROCEDURE — BF111ZZ FLUOROSCOPY OF BILIARY AND PANCREATIC DUCTS USING LOW OSMOLAR CONTRAST: ICD-10-PCS | Performed by: INTERNAL MEDICINE

## 2019-01-01 PROCEDURE — 82390 ASSAY OF CERULOPLASMIN: CPT

## 2019-01-01 PROCEDURE — 85049 AUTOMATED PLATELET COUNT: CPT

## 2019-01-01 PROCEDURE — 80202 ASSAY OF VANCOMYCIN: CPT

## 2019-01-01 PROCEDURE — 82728 ASSAY OF FERRITIN: CPT

## 2019-01-01 PROCEDURE — 2580000003 HC RX 258: Performed by: NURSE PRACTITIONER

## 2019-01-01 PROCEDURE — 85730 THROMBOPLASTIN TIME PARTIAL: CPT

## 2019-01-01 PROCEDURE — 2580000003 HC RX 258: Performed by: EMERGENCY MEDICINE

## 2019-01-01 PROCEDURE — 2700000000 HC OXYGEN THERAPY PER DAY

## 2019-01-01 PROCEDURE — 6360000002 HC RX W HCPCS: Performed by: RADIOLOGY

## 2019-01-01 PROCEDURE — G0378 HOSPITAL OBSERVATION PER HR: HCPCS

## 2019-01-01 PROCEDURE — 2500000003 HC RX 250 WO HCPCS: Performed by: NURSE PRACTITIONER

## 2019-01-01 PROCEDURE — 96375 TX/PRO/DX INJ NEW DRUG ADDON: CPT

## 2019-01-01 PROCEDURE — 2500000003 HC RX 250 WO HCPCS: Performed by: INTERNAL MEDICINE

## 2019-01-01 PROCEDURE — 7100000000 HC PACU RECOVERY - FIRST 15 MIN: Performed by: INTERNAL MEDICINE

## 2019-01-01 PROCEDURE — 82103 ALPHA-1-ANTITRYPSIN TOTAL: CPT

## 2019-01-01 PROCEDURE — G0379 DIRECT REFER HOSPITAL OBSERV: HCPCS

## 2019-01-01 PROCEDURE — 71045 X-RAY EXAM CHEST 1 VIEW: CPT

## 2019-01-01 PROCEDURE — 83540 ASSAY OF IRON: CPT

## 2019-01-01 PROCEDURE — 7100000011 HC PHASE II RECOVERY - ADDTL 15 MIN

## 2019-01-01 PROCEDURE — 84484 ASSAY OF TROPONIN QUANT: CPT

## 2019-01-01 PROCEDURE — 99222 1ST HOSP IP/OBS MODERATE 55: CPT | Performed by: INTERNAL MEDICINE

## 2019-01-01 PROCEDURE — 0BBG3ZX EXCISION OF LEFT UPPER LUNG LOBE, PERCUTANEOUS APPROACH, DIAGNOSTIC: ICD-10-PCS | Performed by: RADIOLOGY

## 2019-01-01 PROCEDURE — 93306 TTE W/DOPPLER COMPLETE: CPT

## 2019-01-01 PROCEDURE — 99214 OFFICE O/P EST MOD 30 MIN: CPT | Performed by: INTERNAL MEDICINE

## 2019-01-01 PROCEDURE — 99283 EMERGENCY DEPT VISIT LOW MDM: CPT

## 2019-01-01 PROCEDURE — 90686 IIV4 VACC NO PRSV 0.5 ML IM: CPT | Performed by: INTERNAL MEDICINE

## 2019-01-01 PROCEDURE — 86665 EPSTEIN-BARR CAPSID VCA: CPT

## 2019-01-01 PROCEDURE — 70553 MRI BRAIN STEM W/O & W/DYE: CPT

## 2019-01-01 PROCEDURE — 99223 1ST HOSP IP/OBS HIGH 75: CPT | Performed by: INTERNAL MEDICINE

## 2019-01-01 PROCEDURE — 86038 ANTINUCLEAR ANTIBODIES: CPT

## 2019-01-01 PROCEDURE — 86664 EPSTEIN-BARR NUCLEAR ANTIGEN: CPT

## 2019-01-01 PROCEDURE — C1769 GUIDE WIRE: HCPCS | Performed by: INTERNAL MEDICINE

## 2019-01-01 PROCEDURE — 3609018800 HC ERCP DX COLLECTION SPECIMEN BRUSHING/WASHING: Performed by: INTERNAL MEDICINE

## 2019-01-01 PROCEDURE — 99285 EMERGENCY DEPT VISIT HI MDM: CPT

## 2019-01-01 PROCEDURE — 36561 INSERT TUNNELED CV CATH: CPT | Performed by: RADIOLOGY

## 2019-01-01 PROCEDURE — 80061 LIPID PANEL: CPT

## 2019-01-01 PROCEDURE — 74330 X-RAY BILE/PANC ENDOSCOPY: CPT

## 2019-01-01 PROCEDURE — 93970 EXTREMITY STUDY: CPT

## 2019-01-01 PROCEDURE — 83516 IMMUNOASSAY NONANTIBODY: CPT

## 2019-01-01 PROCEDURE — APPNB30 APP NON BILLABLE TIME 0-30 MINS: Performed by: NURSE PRACTITIONER

## 2019-01-01 PROCEDURE — 99214 OFFICE O/P EST MOD 30 MIN: CPT | Performed by: PHYSICIAN ASSISTANT

## 2019-01-01 PROCEDURE — 7100000010 HC PHASE II RECOVERY - FIRST 15 MIN

## 2019-01-01 PROCEDURE — 6360000002 HC RX W HCPCS: Performed by: EMERGENCY MEDICINE

## 2019-01-01 PROCEDURE — 94760 N-INVAS EAR/PLS OXIMETRY 1: CPT

## 2019-01-01 PROCEDURE — G0008 ADMIN INFLUENZA VIRUS VAC: HCPCS | Performed by: INTERNAL MEDICINE

## 2019-01-01 PROCEDURE — 82977 ASSAY OF GGT: CPT

## 2019-01-01 PROCEDURE — 1111F DSCHRG MED/CURRENT MED MERGE: CPT | Performed by: INTERNAL MEDICINE

## 2019-01-01 PROCEDURE — 82784 ASSAY IGA/IGD/IGG/IGM EACH: CPT

## 2019-01-01 PROCEDURE — 88341 IMHCHEM/IMCYTCHM EA ADD ANTB: CPT

## 2019-01-01 PROCEDURE — 86645 CMV ANTIBODY IGM: CPT

## 2019-01-01 PROCEDURE — 80074 ACUTE HEPATITIS PANEL: CPT

## 2019-01-01 PROCEDURE — 2500000003 HC RX 250 WO HCPCS

## 2019-01-01 PROCEDURE — 88305 TISSUE EXAM BY PATHOLOGIST: CPT

## 2019-01-01 PROCEDURE — 76705 ECHO EXAM OF ABDOMEN: CPT

## 2019-01-01 PROCEDURE — 76937 US GUIDE VASCULAR ACCESS: CPT | Performed by: RADIOLOGY

## 2019-01-01 PROCEDURE — 77063 BREAST TOMOSYNTHESIS BI: CPT

## 2019-01-01 PROCEDURE — 43260 ERCP W/SPECIMEN COLLECTION: CPT | Performed by: INTERNAL MEDICINE

## 2019-01-01 PROCEDURE — 83605 ASSAY OF LACTIC ACID: CPT

## 2019-01-01 PROCEDURE — 82746 ASSAY OF FOLIC ACID SERUM: CPT

## 2019-01-01 PROCEDURE — 82607 VITAMIN B-12: CPT

## 2019-01-01 PROCEDURE — 86376 MICROSOMAL ANTIBODY EACH: CPT

## 2019-01-01 PROCEDURE — 96366 THER/PROPH/DIAG IV INF ADDON: CPT

## 2019-01-01 PROCEDURE — 2709999900 HC NON-CHARGEABLE SUPPLY: Performed by: INTERNAL MEDICINE

## 2019-01-01 PROCEDURE — 80076 HEPATIC FUNCTION PANEL: CPT

## 2019-01-01 PROCEDURE — 71260 CT THORAX DX C+: CPT

## 2019-01-01 PROCEDURE — 96361 HYDRATE IV INFUSION ADD-ON: CPT

## 2019-01-01 PROCEDURE — 77001 FLUOROGUIDE FOR VEIN DEVICE: CPT | Performed by: RADIOLOGY

## 2019-01-01 PROCEDURE — 86644 CMV ANTIBODY: CPT

## 2019-01-01 PROCEDURE — 99211 OFF/OP EST MAY X REQ PHY/QHP: CPT | Performed by: INTERNAL MEDICINE

## 2019-01-01 PROCEDURE — 7100000030 HC ASPR PHASE II RECOVERY - FIRST 15 MIN

## 2019-01-01 PROCEDURE — 97161 PT EVAL LOW COMPLEX 20 MIN: CPT

## 2019-01-01 PROCEDURE — 3700000001 HC ADD 15 MINUTES (ANESTHESIA): Performed by: INTERNAL MEDICINE

## 2019-01-01 PROCEDURE — 81001 URINALYSIS AUTO W/SCOPE: CPT

## 2019-01-01 PROCEDURE — 83550 IRON BINDING TEST: CPT

## 2019-01-01 PROCEDURE — 87086 URINE CULTURE/COLONY COUNT: CPT

## 2019-01-01 PROCEDURE — 97162 PT EVAL MOD COMPLEX 30 MIN: CPT

## 2019-01-01 PROCEDURE — 99496 TRANSJ CARE MGMT HIGH F2F 7D: CPT | Performed by: INTERNAL MEDICINE

## 2019-01-01 PROCEDURE — 0FJD8ZZ INSPECTION OF PANCREATIC DUCT, VIA NATURAL OR ARTIFICIAL OPENING ENDOSCOPIC: ICD-10-PCS | Performed by: INTERNAL MEDICINE

## 2019-01-01 PROCEDURE — 3700000000 HC ANESTHESIA ATTENDED CARE: Performed by: INTERNAL MEDICINE

## 2019-01-01 PROCEDURE — 2580000003 HC RX 258: Performed by: RADIOLOGY

## 2019-01-01 RX ORDER — ACETAMINOPHEN 325 MG/1
650 TABLET ORAL EVERY 4 HOURS PRN
Status: DISCONTINUED | OUTPATIENT
Start: 2019-01-01 | End: 2019-01-01 | Stop reason: HOSPADM

## 2019-01-01 RX ORDER — IPRATROPIUM BROMIDE AND ALBUTEROL SULFATE 2.5; .5 MG/3ML; MG/3ML
3 SOLUTION RESPIRATORY (INHALATION)
Qty: 360 ML | Refills: 1 | DISCHARGE
Start: 2019-01-01

## 2019-01-01 RX ORDER — 0.9 % SODIUM CHLORIDE 0.9 %
500 INTRAVENOUS SOLUTION INTRAVENOUS ONCE
Status: COMPLETED | OUTPATIENT
Start: 2019-01-01 | End: 2019-01-01

## 2019-01-01 RX ORDER — BUSPIRONE HYDROCHLORIDE 10 MG/1
10 TABLET ORAL 3 TIMES DAILY
Qty: 90 TABLET | Refills: 3 | Status: SHIPPED | OUTPATIENT
Start: 2019-01-01

## 2019-01-01 RX ORDER — POTASSIUM CHLORIDE 20 MEQ/1
40 TABLET, EXTENDED RELEASE ORAL PRN
Status: DISCONTINUED | OUTPATIENT
Start: 2019-01-01 | End: 2019-01-01 | Stop reason: HOSPADM

## 2019-01-01 RX ORDER — PREDNISONE 20 MG/1
20 TABLET ORAL DAILY
Status: DISCONTINUED | OUTPATIENT
Start: 2019-01-01 | End: 2019-01-01 | Stop reason: HOSPADM

## 2019-01-01 RX ORDER — TRAMADOL HYDROCHLORIDE 50 MG/1
50 TABLET ORAL EVERY 4 HOURS PRN
Status: DISCONTINUED | OUTPATIENT
Start: 2019-01-01 | End: 2019-01-01 | Stop reason: HOSPADM

## 2019-01-01 RX ORDER — LEVALBUTEROL 1.25 MG/.5ML
1.25 SOLUTION, CONCENTRATE RESPIRATORY (INHALATION) 3 TIMES DAILY
Qty: 90 EACH | Refills: 3 | Status: SHIPPED | OUTPATIENT
Start: 2019-01-01 | End: 2019-01-01

## 2019-01-01 RX ORDER — LORAZEPAM 2 MG/ML
0.5 INJECTION INTRAMUSCULAR
Status: COMPLETED | OUTPATIENT
Start: 2019-01-01 | End: 2019-01-01

## 2019-01-01 RX ORDER — SODIUM CHLORIDE 9 MG/ML
INJECTION, SOLUTION INTRAVENOUS CONTINUOUS
Status: CANCELLED | OUTPATIENT
Start: 2019-01-01

## 2019-01-01 RX ORDER — CEPHALEXIN 500 MG/1
500 CAPSULE ORAL 3 TIMES DAILY
Status: DISCONTINUED | OUTPATIENT
Start: 2019-01-01 | End: 2019-01-01

## 2019-01-01 RX ORDER — METOPROLOL TARTRATE 5 MG/5ML
INJECTION INTRAVENOUS PRN
Status: DISCONTINUED | OUTPATIENT
Start: 2019-01-01 | End: 2019-01-01 | Stop reason: SDUPTHER

## 2019-01-01 RX ORDER — SODIUM CHLORIDE 1000 MG
1 TABLET, SOLUBLE MISCELLANEOUS
Status: DISCONTINUED | OUTPATIENT
Start: 2019-01-01 | End: 2019-01-01 | Stop reason: HOSPADM

## 2019-01-01 RX ORDER — METOPROLOL TARTRATE 5 MG/5ML
2.5 INJECTION INTRAVENOUS ONCE
Status: COMPLETED | OUTPATIENT
Start: 2019-01-01 | End: 2019-01-01

## 2019-01-01 RX ORDER — SODIUM CHLORIDE FOR INHALATION 0.9 %
3 VIAL, NEBULIZER (ML) INHALATION EVERY 8 HOURS PRN
Status: DISCONTINUED | OUTPATIENT
Start: 2019-01-01 | End: 2019-01-01

## 2019-01-01 RX ORDER — PROMETHAZINE HYDROCHLORIDE 25 MG/ML
12.5 INJECTION, SOLUTION INTRAMUSCULAR; INTRAVENOUS EVERY 6 HOURS PRN
Status: DISCONTINUED | OUTPATIENT
Start: 2019-01-01 | End: 2019-01-01 | Stop reason: CLARIF

## 2019-01-01 RX ORDER — NICOTINE 21 MG/24HR
1 PATCH, TRANSDERMAL 24 HOURS TRANSDERMAL DAILY
Qty: 30 PATCH | Refills: 3 | Status: SHIPPED | OUTPATIENT
Start: 2019-01-01

## 2019-01-01 RX ORDER — MIDODRINE HYDROCHLORIDE 10 MG/1
TABLET ORAL
Qty: 180 TABLET | Refills: 3 | Status: SHIPPED | OUTPATIENT
Start: 2019-01-01 | End: 2019-01-01 | Stop reason: ALTCHOICE

## 2019-01-01 RX ORDER — SODIUM CHLORIDE 0.9 % (FLUSH) 0.9 %
10 SYRINGE (ML) INJECTION PRN
Status: DISCONTINUED | OUTPATIENT
Start: 2019-01-01 | End: 2019-01-01 | Stop reason: HOSPADM

## 2019-01-01 RX ORDER — SIMVASTATIN 40 MG
40 TABLET ORAL NIGHTLY
Status: DISCONTINUED | OUTPATIENT
Start: 2019-01-01 | End: 2019-01-01 | Stop reason: HOSPADM

## 2019-01-01 RX ORDER — PREDNISONE 20 MG/1
20 TABLET ORAL DAILY
Qty: 5 TABLET | Refills: 0 | OUTPATIENT
Start: 2019-01-01 | End: 2019-01-01

## 2019-01-01 RX ORDER — LIDOCAINE AND PRILOCAINE 25; 25 MG/G; MG/G
CREAM TOPICAL
Qty: 30 G | Refills: 0 | Status: SHIPPED | OUTPATIENT
Start: 2019-01-01

## 2019-01-01 RX ORDER — OXYCODONE HYDROCHLORIDE AND ACETAMINOPHEN 5; 325 MG/1; MG/1
1 TABLET ORAL EVERY 6 HOURS PRN
Qty: 20 TABLET | Refills: 0 | Status: SHIPPED | OUTPATIENT
Start: 2019-01-01 | End: 2019-01-01 | Stop reason: ALTCHOICE

## 2019-01-01 RX ORDER — SIMVASTATIN 40 MG
TABLET ORAL
Qty: 90 TABLET | Refills: 3 | Status: ON HOLD | OUTPATIENT
Start: 2019-01-01 | End: 2019-01-01 | Stop reason: HOSPADM

## 2019-01-01 RX ORDER — QUETIAPINE FUMARATE 100 MG/1
100 TABLET, FILM COATED ORAL NIGHTLY
Status: DISCONTINUED | OUTPATIENT
Start: 2019-01-01 | End: 2019-01-01

## 2019-01-01 RX ORDER — ONDANSETRON 4 MG/1
4 TABLET, ORALLY DISINTEGRATING ORAL EVERY 8 HOURS PRN
Status: DISCONTINUED | OUTPATIENT
Start: 2019-01-01 | End: 2019-01-01 | Stop reason: HOSPADM

## 2019-01-01 RX ORDER — CEPHALEXIN 500 MG/1
500 CAPSULE ORAL 3 TIMES DAILY
Qty: 30 CAPSULE | Refills: 0 | Status: ON HOLD | OUTPATIENT
Start: 2019-01-01 | End: 2019-01-01 | Stop reason: HOSPADM

## 2019-01-01 RX ORDER — IPRATROPIUM BROMIDE AND ALBUTEROL SULFATE 2.5; .5 MG/3ML; MG/3ML
1 SOLUTION RESPIRATORY (INHALATION) ONCE
Status: COMPLETED | OUTPATIENT
Start: 2019-01-01 | End: 2019-01-01

## 2019-01-01 RX ORDER — QUETIAPINE FUMARATE 100 MG/1
100 TABLET, FILM COATED ORAL NIGHTLY
Status: DISCONTINUED | OUTPATIENT
Start: 2019-01-01 | End: 2019-01-01 | Stop reason: HOSPADM

## 2019-01-01 RX ORDER — MIDAZOLAM HYDROCHLORIDE 1 MG/ML
INJECTION INTRAMUSCULAR; INTRAVENOUS PRN
Status: DISCONTINUED | OUTPATIENT
Start: 2019-01-01 | End: 2019-01-01 | Stop reason: SDUPTHER

## 2019-01-01 RX ORDER — MIDODRINE HYDROCHLORIDE 10 MG/1
10 TABLET ORAL 2 TIMES DAILY
Status: DISCONTINUED | OUTPATIENT
Start: 2019-01-01 | End: 2019-01-01 | Stop reason: HOSPADM

## 2019-01-01 RX ORDER — 0.9 % SODIUM CHLORIDE 0.9 %
80 INTRAVENOUS SOLUTION INTRAVENOUS ONCE
Status: COMPLETED | OUTPATIENT
Start: 2019-01-01 | End: 2019-01-01

## 2019-01-01 RX ORDER — QUETIAPINE FUMARATE 50 MG/1
50 TABLET, FILM COATED ORAL NIGHTLY
Status: DISCONTINUED | OUTPATIENT
Start: 2019-01-01 | End: 2019-01-01

## 2019-01-01 RX ORDER — PREDNISONE 20 MG/1
20 TABLET ORAL DAILY
Qty: 5 TABLET | Refills: 0 | Status: SHIPPED | OUTPATIENT
Start: 2019-01-01 | End: 2019-01-01

## 2019-01-01 RX ORDER — LORAZEPAM 0.5 MG/1
0.5 TABLET ORAL 2 TIMES DAILY PRN
Status: DISCONTINUED | OUTPATIENT
Start: 2019-01-01 | End: 2019-01-01 | Stop reason: HOSPADM

## 2019-01-01 RX ORDER — NICOTINE 21 MG/24HR
1 PATCH, TRANSDERMAL 24 HOURS TRANSDERMAL ONCE
Status: COMPLETED | OUTPATIENT
Start: 2019-01-01 | End: 2019-01-01

## 2019-01-01 RX ORDER — ONDANSETRON 2 MG/ML
4 INJECTION INTRAMUSCULAR; INTRAVENOUS ONCE
Status: COMPLETED | OUTPATIENT
Start: 2019-01-01 | End: 2019-01-01

## 2019-01-01 RX ORDER — METHYLPREDNISOLONE SODIUM SUCCINATE 40 MG/ML
40 INJECTION, POWDER, LYOPHILIZED, FOR SOLUTION INTRAMUSCULAR; INTRAVENOUS EVERY 6 HOURS
Status: DISCONTINUED | OUTPATIENT
Start: 2019-01-01 | End: 2019-01-01

## 2019-01-01 RX ORDER — SODIUM CHLORIDE 9 MG/ML
INJECTION, SOLUTION INTRAVENOUS CONTINUOUS
Status: DISCONTINUED | OUTPATIENT
Start: 2019-01-01 | End: 2019-01-01 | Stop reason: HOSPADM

## 2019-01-01 RX ORDER — MORPHINE SULFATE 2 MG/ML
2 INJECTION, SOLUTION INTRAMUSCULAR; INTRAVENOUS
Status: DISCONTINUED | OUTPATIENT
Start: 2019-01-01 | End: 2019-01-01

## 2019-01-01 RX ORDER — ESCITALOPRAM OXALATE 10 MG/1
10 TABLET ORAL DAILY
Status: DISCONTINUED | OUTPATIENT
Start: 2019-01-01 | End: 2019-01-01

## 2019-01-01 RX ORDER — LIDOCAINE HYDROCHLORIDE 10 MG/ML
INJECTION, SOLUTION INFILTRATION; PERINEURAL
Status: COMPLETED | OUTPATIENT
Start: 2019-01-01 | End: 2019-01-01

## 2019-01-01 RX ORDER — ONDANSETRON 8 MG/1
8 TABLET, ORALLY DISINTEGRATING ORAL 3 TIMES DAILY PRN
Qty: 90 TABLET | Refills: 3 | Status: SHIPPED | OUTPATIENT
Start: 2019-01-01

## 2019-01-01 RX ORDER — TOLVAPTAN 15 MG/1
7.5 TABLET ORAL ONCE
Status: COMPLETED | OUTPATIENT
Start: 2019-01-01 | End: 2019-01-01

## 2019-01-01 RX ORDER — OXYCODONE HYDROCHLORIDE AND ACETAMINOPHEN 5; 325 MG/1; MG/1
1 TABLET ORAL EVERY 6 HOURS PRN
Qty: 60 TABLET | Refills: 0 | Status: SHIPPED | OUTPATIENT
Start: 2019-01-01 | End: 2019-01-01

## 2019-01-01 RX ORDER — KETAMINE HYDROCHLORIDE 10 MG/ML
INJECTION, SOLUTION INTRAMUSCULAR; INTRAVENOUS PRN
Status: DISCONTINUED | OUTPATIENT
Start: 2019-01-01 | End: 2019-01-01 | Stop reason: SDUPTHER

## 2019-01-01 RX ORDER — 0.9 % SODIUM CHLORIDE 0.9 %
1000 INTRAVENOUS SOLUTION INTRAVENOUS ONCE
Status: COMPLETED | OUTPATIENT
Start: 2019-01-01 | End: 2019-01-01

## 2019-01-01 RX ORDER — FAMOTIDINE 20 MG/1
20 TABLET, FILM COATED ORAL 2 TIMES DAILY
Status: DISCONTINUED | OUTPATIENT
Start: 2019-01-01 | End: 2019-01-01

## 2019-01-01 RX ORDER — METHYLPREDNISOLONE SODIUM SUCCINATE 40 MG/ML
30 INJECTION, POWDER, LYOPHILIZED, FOR SOLUTION INTRAMUSCULAR; INTRAVENOUS EVERY 8 HOURS
Status: DISCONTINUED | OUTPATIENT
Start: 2019-01-01 | End: 2019-01-01 | Stop reason: HOSPADM

## 2019-01-01 RX ORDER — SODIUM CHLORIDE 1000 MG
1 TABLET, SOLUBLE MISCELLANEOUS
Qty: 90 TABLET | Refills: 3 | Status: SHIPPED | OUTPATIENT
Start: 2019-01-01

## 2019-01-01 RX ORDER — METOPROLOL TARTRATE 5 MG/5ML
2.5 INJECTION INTRAVENOUS EVERY 6 HOURS
Status: DISCONTINUED | OUTPATIENT
Start: 2019-01-01 | End: 2019-01-01 | Stop reason: HOSPADM

## 2019-01-01 RX ORDER — NICOTINE 21 MG/24HR
1 PATCH, TRANSDERMAL 24 HOURS TRANSDERMAL DAILY
Status: DISCONTINUED | OUTPATIENT
Start: 2019-01-01 | End: 2019-01-01 | Stop reason: HOSPADM

## 2019-01-01 RX ORDER — TRAMADOL HYDROCHLORIDE 50 MG/1
50 TABLET ORAL EVERY 4 HOURS PRN
Qty: 18 TABLET | Refills: 0 | Status: SHIPPED | OUTPATIENT
Start: 2019-01-01 | End: 2019-10-26

## 2019-01-01 RX ORDER — SIMVASTATIN 40 MG
TABLET ORAL
Qty: 90 TABLET | Refills: 0 | Status: SHIPPED | OUTPATIENT
Start: 2019-01-01 | End: 2019-01-01 | Stop reason: SDUPTHER

## 2019-01-01 RX ORDER — POTASSIUM CHLORIDE 7.45 MG/ML
10 INJECTION INTRAVENOUS PRN
Status: DISCONTINUED | OUTPATIENT
Start: 2019-01-01 | End: 2019-01-01 | Stop reason: HOSPADM

## 2019-01-01 RX ORDER — FAMOTIDINE 20 MG/1
20 TABLET, FILM COATED ORAL DAILY
Status: DISCONTINUED | OUTPATIENT
Start: 2019-01-01 | End: 2019-01-01 | Stop reason: HOSPADM

## 2019-01-01 RX ORDER — ONDANSETRON 4 MG/1
4 TABLET, ORALLY DISINTEGRATING ORAL EVERY 8 HOURS PRN
Qty: 12 TABLET | Refills: 0 | Status: ON HOLD | OUTPATIENT
Start: 2019-01-01 | End: 2019-01-01 | Stop reason: HOSPADM

## 2019-01-01 RX ORDER — LIDOCAINE 4 G/G
1 PATCH TOPICAL DAILY
Qty: 30 PATCH | Refills: 0 | Status: SHIPPED | OUTPATIENT
Start: 2019-01-01 | End: 2019-01-01

## 2019-01-01 RX ORDER — PREDNISONE 20 MG/1
40 TABLET ORAL DAILY
Status: DISCONTINUED | OUTPATIENT
Start: 2019-01-01 | End: 2019-01-01

## 2019-01-01 RX ORDER — ONDANSETRON 4 MG/1
4 TABLET, ORALLY DISINTEGRATING ORAL EVERY 6 HOURS PRN
Status: DISCONTINUED | OUTPATIENT
Start: 2019-01-01 | End: 2019-01-01 | Stop reason: HOSPADM

## 2019-01-01 RX ORDER — FAMOTIDINE 20 MG/1
20 TABLET, FILM COATED ORAL 2 TIMES DAILY
Status: DISCONTINUED | OUTPATIENT
Start: 2019-01-01 | End: 2019-01-01 | Stop reason: HOSPADM

## 2019-01-01 RX ORDER — ALBUTEROL SULFATE 90 UG/1
2 AEROSOL, METERED RESPIRATORY (INHALATION) EVERY 6 HOURS PRN
Status: DISCONTINUED | OUTPATIENT
Start: 2019-01-01 | End: 2019-01-01

## 2019-01-01 RX ORDER — METOPROLOL TARTRATE 5 MG/5ML
2.5 INJECTION INTRAVENOUS EVERY 6 HOURS
Status: DISCONTINUED | OUTPATIENT
Start: 2019-01-01 | End: 2019-01-01

## 2019-01-01 RX ORDER — ONDANSETRON 4 MG/1
4 TABLET, ORALLY DISINTEGRATING ORAL EVERY 8 HOURS PRN
Status: DISCONTINUED | OUTPATIENT
Start: 2019-01-01 | End: 2019-01-01

## 2019-01-01 RX ORDER — NICOTINE 21 MG/24HR
1 PATCH, TRANSDERMAL 24 HOURS TRANSDERMAL DAILY
Status: DISCONTINUED | OUTPATIENT
Start: 2019-01-01 | End: 2019-01-01

## 2019-01-01 RX ORDER — FAMOTIDINE 20 MG/1
20 TABLET, FILM COATED ORAL 2 TIMES DAILY
Qty: 60 TABLET | Refills: 3 | Status: SHIPPED | OUTPATIENT
Start: 2019-01-01 | End: 2019-01-01 | Stop reason: SDUPTHER

## 2019-01-01 RX ORDER — DOCUSATE SODIUM 100 MG/1
100 CAPSULE, LIQUID FILLED ORAL DAILY
Status: DISCONTINUED | OUTPATIENT
Start: 2019-01-01 | End: 2019-01-01 | Stop reason: HOSPADM

## 2019-01-01 RX ORDER — TRAMADOL HYDROCHLORIDE 50 MG/1
50 TABLET ORAL EVERY 4 HOURS PRN
Qty: 18 TABLET | Refills: 0 | Status: ON HOLD | OUTPATIENT
Start: 2019-01-01 | End: 2019-01-01

## 2019-01-01 RX ORDER — BUDESONIDE AND FORMOTEROL FUMARATE DIHYDRATE 160; 4.5 UG/1; UG/1
AEROSOL RESPIRATORY (INHALATION)
Qty: 10.2 G | Refills: 6 | Status: ON HOLD | OUTPATIENT
Start: 2019-01-01 | End: 2019-01-01 | Stop reason: HOSPADM

## 2019-01-01 RX ORDER — HEPARIN SODIUM (PORCINE) LOCK FLUSH IV SOLN 100 UNIT/ML 100 UNIT/ML
SOLUTION INTRAVENOUS
Status: COMPLETED | OUTPATIENT
Start: 2019-01-01 | End: 2019-01-01

## 2019-01-01 RX ORDER — METHYLPREDNISOLONE SODIUM SUCCINATE 40 MG/ML
40 INJECTION, POWDER, LYOPHILIZED, FOR SOLUTION INTRAMUSCULAR; INTRAVENOUS EVERY 8 HOURS
Status: DISCONTINUED | OUTPATIENT
Start: 2019-01-01 | End: 2019-01-01

## 2019-01-01 RX ORDER — MORPHINE SULFATE 4 MG/ML
4 INJECTION, SOLUTION INTRAMUSCULAR; INTRAVENOUS
Status: DISCONTINUED | OUTPATIENT
Start: 2019-01-01 | End: 2019-01-01 | Stop reason: HOSPADM

## 2019-01-01 RX ORDER — BUSPIRONE HYDROCHLORIDE 10 MG/1
10 TABLET ORAL 3 TIMES DAILY
Status: DISCONTINUED | OUTPATIENT
Start: 2019-01-01 | End: 2019-01-01 | Stop reason: HOSPADM

## 2019-01-01 RX ORDER — PROPOFOL 10 MG/ML
INJECTION, EMULSION INTRAVENOUS CONTINUOUS PRN
Status: DISCONTINUED | OUTPATIENT
Start: 2019-01-01 | End: 2019-01-01 | Stop reason: SDUPTHER

## 2019-01-01 RX ORDER — SODIUM CHLORIDE 0.9 % (FLUSH) 0.9 %
10 SYRINGE (ML) INJECTION EVERY 12 HOURS SCHEDULED
Status: DISCONTINUED | OUTPATIENT
Start: 2019-01-01 | End: 2019-01-01 | Stop reason: HOSPADM

## 2019-01-01 RX ORDER — LIDOCAINE HYDROCHLORIDE AND EPINEPHRINE 10; 10 MG/ML; UG/ML
INJECTION, SOLUTION INFILTRATION; PERINEURAL
Status: COMPLETED | OUTPATIENT
Start: 2019-01-01 | End: 2019-01-01

## 2019-01-01 RX ORDER — FAMOTIDINE 20 MG/1
TABLET, FILM COATED ORAL
Qty: 180 TABLET | Refills: 3 | Status: SHIPPED | OUTPATIENT
Start: 2019-01-01

## 2019-01-01 RX ORDER — IPRATROPIUM BROMIDE AND ALBUTEROL SULFATE 2.5; .5 MG/3ML; MG/3ML
1 SOLUTION RESPIRATORY (INHALATION) 4 TIMES DAILY
Status: DISCONTINUED | OUTPATIENT
Start: 2019-01-01 | End: 2019-01-01

## 2019-01-01 RX ORDER — ACETAMINOPHEN 325 MG/1
650 TABLET ORAL EVERY 4 HOURS PRN
Status: DISCONTINUED | OUTPATIENT
Start: 2019-01-01 | End: 2019-01-01 | Stop reason: SDUPTHER

## 2019-01-01 RX ORDER — IPRATROPIUM BROMIDE AND ALBUTEROL SULFATE 2.5; .5 MG/3ML; MG/3ML
1 SOLUTION RESPIRATORY (INHALATION)
Status: DISCONTINUED | OUTPATIENT
Start: 2019-01-01 | End: 2019-01-01

## 2019-01-01 RX ORDER — LEVALBUTEROL 1.25 MG/.5ML
1.25 SOLUTION, CONCENTRATE RESPIRATORY (INHALATION) 4 TIMES DAILY
Status: DISCONTINUED | OUTPATIENT
Start: 2019-01-01 | End: 2019-01-01 | Stop reason: HOSPADM

## 2019-01-01 RX ORDER — MORPHINE SULFATE 2 MG/ML
2 INJECTION, SOLUTION INTRAMUSCULAR; INTRAVENOUS EVERY 4 HOURS PRN
Status: DISCONTINUED | OUTPATIENT
Start: 2019-01-01 | End: 2019-01-01

## 2019-01-01 RX ORDER — BUSPIRONE HYDROCHLORIDE 10 MG/1
10 TABLET ORAL 3 TIMES DAILY
Qty: 90 TABLET | Refills: 3 | OUTPATIENT
Start: 2019-01-01

## 2019-01-01 RX ORDER — M-VIT,TX,IRON,MINS/CALC/FOLIC 27MG-0.4MG
1 TABLET ORAL DAILY
Status: DISCONTINUED | OUTPATIENT
Start: 2019-01-01 | End: 2019-01-01 | Stop reason: HOSPADM

## 2019-01-01 RX ORDER — LEVALBUTEROL 1.25 MG/.5ML
1.25 SOLUTION, CONCENTRATE RESPIRATORY (INHALATION) EVERY 4 HOURS PRN
Status: DISCONTINUED | OUTPATIENT
Start: 2019-01-01 | End: 2019-01-01 | Stop reason: HOSPADM

## 2019-01-01 RX ORDER — DIPHENHYDRAMINE HYDROCHLORIDE 50 MG/ML
12.5 INJECTION INTRAMUSCULAR; INTRAVENOUS
Status: DISCONTINUED | OUTPATIENT
Start: 2019-01-01 | End: 2019-01-01 | Stop reason: HOSPADM

## 2019-01-01 RX ORDER — PROMETHAZINE HYDROCHLORIDE 25 MG/ML
6.25 INJECTION, SOLUTION INTRAMUSCULAR; INTRAVENOUS
Status: DISCONTINUED | OUTPATIENT
Start: 2019-01-01 | End: 2019-01-01 | Stop reason: CLARIF

## 2019-01-01 RX ORDER — IPRATROPIUM BROMIDE AND ALBUTEROL SULFATE 2.5; .5 MG/3ML; MG/3ML
1 SOLUTION RESPIRATORY (INHALATION)
Status: DISCONTINUED | OUTPATIENT
Start: 2019-01-01 | End: 2019-01-01 | Stop reason: HOSPADM

## 2019-01-01 RX ORDER — IPRATROPIUM BROMIDE AND ALBUTEROL SULFATE 2.5; .5 MG/3ML; MG/3ML
1 SOLUTION RESPIRATORY (INHALATION)
Status: COMPLETED | OUTPATIENT
Start: 2019-01-01 | End: 2019-01-01

## 2019-01-01 RX ORDER — LABETALOL 20 MG/4 ML (5 MG/ML) INTRAVENOUS SYRINGE
5 EVERY 10 MIN PRN
Status: DISCONTINUED | OUTPATIENT
Start: 2019-01-01 | End: 2019-01-01 | Stop reason: HOSPADM

## 2019-01-01 RX ORDER — TOLVAPTAN 15 MG/1
7.5 TABLET ORAL DAILY
Status: DISCONTINUED | OUTPATIENT
Start: 2019-01-01 | End: 2019-01-01

## 2019-01-01 RX ORDER — SODIUM CHLORIDE 9 MG/ML
INJECTION, SOLUTION INTRAVENOUS CONTINUOUS
Status: DISCONTINUED | OUTPATIENT
Start: 2019-01-01 | End: 2019-01-01

## 2019-01-01 RX ORDER — OXYCODONE HYDROCHLORIDE AND ACETAMINOPHEN 5; 325 MG/1; MG/1
1 TABLET ORAL EVERY 6 HOURS PRN
Qty: 15 TABLET | Refills: 0 | Status: SHIPPED | OUTPATIENT
Start: 2019-01-01 | End: 2019-01-01 | Stop reason: ALTCHOICE

## 2019-01-01 RX ORDER — OXYCODONE HYDROCHLORIDE AND ACETAMINOPHEN 5; 325 MG/1; MG/1
1 TABLET ORAL EVERY 6 HOURS PRN
Status: DISCONTINUED | OUTPATIENT
Start: 2019-01-01 | End: 2019-01-01 | Stop reason: HOSPADM

## 2019-01-01 RX ORDER — ESCITALOPRAM OXALATE 20 MG/1
20 TABLET ORAL DAILY
Status: DISCONTINUED | OUTPATIENT
Start: 2019-01-01 | End: 2019-01-01

## 2019-01-01 RX ORDER — MIRABEGRON 50 MG/1
TABLET, FILM COATED, EXTENDED RELEASE ORAL
Refills: 3 | COMMUNITY
Start: 2019-01-01 | End: 2019-01-01

## 2019-01-01 RX ORDER — ESMOLOL HYDROCHLORIDE 10 MG/ML
INJECTION INTRAVENOUS PRN
Status: DISCONTINUED | OUTPATIENT
Start: 2019-01-01 | End: 2019-01-01 | Stop reason: SDUPTHER

## 2019-01-01 RX ORDER — GLYCOPYRROLATE 1 MG/5 ML
SYRINGE (ML) INTRAVENOUS PRN
Status: DISCONTINUED | OUTPATIENT
Start: 2019-01-01 | End: 2019-01-01 | Stop reason: SDUPTHER

## 2019-01-01 RX ORDER — MECLIZINE HYDROCHLORIDE 25 MG/1
25 TABLET ORAL 3 TIMES DAILY PRN
Qty: 30 TABLET | Refills: 0 | Status: SHIPPED | OUTPATIENT
Start: 2019-01-01 | End: 2019-01-01

## 2019-01-01 RX ORDER — MORPHINE SULFATE 2 MG/ML
2 INJECTION, SOLUTION INTRAMUSCULAR; INTRAVENOUS
Status: DISCONTINUED | OUTPATIENT
Start: 2019-01-01 | End: 2019-01-01 | Stop reason: HOSPADM

## 2019-01-01 RX ORDER — FENTANYL CITRATE 50 UG/ML
INJECTION, SOLUTION INTRAMUSCULAR; INTRAVENOUS PRN
Status: DISCONTINUED | OUTPATIENT
Start: 2019-01-01 | End: 2019-01-01 | Stop reason: SDUPTHER

## 2019-01-01 RX ADMIN — ESMOLOL HYDROCHLORIDE 20 MG: 10 INJECTION, SOLUTION INTRAVENOUS at 14:34

## 2019-01-01 RX ADMIN — FAMOTIDINE 20 MG: 20 TABLET ORAL at 20:41

## 2019-01-01 RX ADMIN — CEFTRIAXONE SODIUM 1 G: 1 INJECTION, POWDER, FOR SOLUTION INTRAMUSCULAR; INTRAVENOUS at 14:26

## 2019-01-01 RX ADMIN — MULTIPLE VITAMINS W/ MINERALS TAB 1 TABLET: TAB at 08:43

## 2019-01-01 RX ADMIN — ONDANSETRON 4 MG: 4 TABLET, ORALLY DISINTEGRATING ORAL at 21:39

## 2019-01-01 RX ADMIN — LORAZEPAM 0.5 MG: 0.5 TABLET ORAL at 20:00

## 2019-01-01 RX ADMIN — QUETIAPINE FUMARATE 100 MG: 100 TABLET ORAL at 23:18

## 2019-01-01 RX ADMIN — METHYLPREDNISOLONE SODIUM SUCCINATE 40 MG: 40 INJECTION, POWDER, FOR SOLUTION INTRAMUSCULAR; INTRAVENOUS at 03:25

## 2019-01-01 RX ADMIN — SODIUM CHLORIDE TAB 1 GM 1 G: 1 TAB at 16:02

## 2019-01-01 RX ADMIN — IPRATROPIUM BROMIDE AND ALBUTEROL SULFATE 1 AMPULE: .5; 3 SOLUTION RESPIRATORY (INHALATION) at 19:56

## 2019-01-01 RX ADMIN — LEVALBUTEROL 1.25 MG: 1.25 SOLUTION, CONCENTRATE RESPIRATORY (INHALATION) at 20:32

## 2019-01-01 RX ADMIN — METHYLPREDNISOLONE SODIUM SUCCINATE 30 MG: 40 INJECTION, POWDER, FOR SOLUTION INTRAMUSCULAR; INTRAVENOUS at 08:56

## 2019-01-01 RX ADMIN — KETAMINE HYDROCHLORIDE 20 MG: 10 INJECTION, SOLUTION INTRAMUSCULAR; INTRAVENOUS at 14:37

## 2019-01-01 RX ADMIN — SODIUM CHLORIDE TAB 1 GM 1 G: 1 TAB at 08:44

## 2019-01-01 RX ADMIN — CEFAZOLIN 1 G: 1 INJECTION, POWDER, FOR SOLUTION INTRAMUSCULAR; INTRAVENOUS at 09:29

## 2019-01-01 RX ADMIN — IOVERSOL 75 ML: 741 INJECTION INTRA-ARTERIAL; INTRAVENOUS at 11:45

## 2019-01-01 RX ADMIN — DOCUSATE SODIUM 100 MG: 100 CAPSULE, LIQUID FILLED ORAL at 08:48

## 2019-01-01 RX ADMIN — FAMOTIDINE 20 MG: 20 TABLET ORAL at 19:49

## 2019-01-01 RX ADMIN — ENOXAPARIN SODIUM 40 MG: 40 INJECTION SUBCUTANEOUS at 08:49

## 2019-01-01 RX ADMIN — FAMOTIDINE 20 MG: 20 TABLET ORAL at 20:48

## 2019-01-01 RX ADMIN — SODIUM CHLORIDE TAB 1 GM 1 G: 1 TAB at 12:24

## 2019-01-01 RX ADMIN — Medication 1500 MG: at 08:17

## 2019-01-01 RX ADMIN — CEPHALEXIN 500 MG: 500 CAPSULE ORAL at 09:45

## 2019-01-01 RX ADMIN — BUSPIRONE HYDROCHLORIDE 10 MG: 10 TABLET ORAL at 08:57

## 2019-01-01 RX ADMIN — IPRATROPIUM BROMIDE AND ALBUTEROL SULFATE 1 AMPULE: .5; 3 SOLUTION RESPIRATORY (INHALATION) at 16:27

## 2019-01-01 RX ADMIN — FAMOTIDINE 20 MG: 20 TABLET ORAL at 20:40

## 2019-01-01 RX ADMIN — SODIUM CHLORIDE TAB 1 GM 1 G: 1 TAB at 17:07

## 2019-01-01 RX ADMIN — MORPHINE SULFATE 4 MG: 4 INJECTION, SOLUTION INTRAMUSCULAR; INTRAVENOUS at 01:56

## 2019-01-01 RX ADMIN — Medication 10 ML: at 22:29

## 2019-01-01 RX ADMIN — PIPERACILLIN SODIUM AND TAZOBACTAM SODIUM 3.38 G: 3; .375 INJECTION, POWDER, LYOPHILIZED, FOR SOLUTION INTRAVENOUS at 01:28

## 2019-01-01 RX ADMIN — IPRATROPIUM BROMIDE 0.5 MG: 0.5 SOLUTION RESPIRATORY (INHALATION) at 07:50

## 2019-01-01 RX ADMIN — LEVALBUTEROL 1.25 MG: 1.25 SOLUTION, CONCENTRATE RESPIRATORY (INHALATION) at 07:02

## 2019-01-01 RX ADMIN — IPRATROPIUM BROMIDE 0.5 MG: 0.5 SOLUTION RESPIRATORY (INHALATION) at 07:00

## 2019-01-01 RX ADMIN — SODIUM CHLORIDE TAB 1 GM 1 G: 1 TAB at 09:39

## 2019-01-01 RX ADMIN — MOMETASONE FUROATE AND FORMOTEROL FUMARATE DIHYDRATE 2 PUFF: 200; 5 AEROSOL RESPIRATORY (INHALATION) at 23:08

## 2019-01-01 RX ADMIN — METHYLPREDNISOLONE SODIUM SUCCINATE 30 MG: 40 INJECTION, POWDER, FOR SOLUTION INTRAMUSCULAR; INTRAVENOUS at 11:35

## 2019-01-01 RX ADMIN — SIMVASTATIN 40 MG: 40 TABLET, FILM COATED ORAL at 00:28

## 2019-01-01 RX ADMIN — IPRATROPIUM BROMIDE AND ALBUTEROL SULFATE 1 AMPULE: .5; 3 SOLUTION RESPIRATORY (INHALATION) at 07:49

## 2019-01-01 RX ADMIN — DOCUSATE SODIUM 100 MG: 100 CAPSULE, LIQUID FILLED ORAL at 08:44

## 2019-01-01 RX ADMIN — TRAMADOL HYDROCHLORIDE 50 MG: 50 TABLET, COATED ORAL at 23:18

## 2019-01-01 RX ADMIN — IPRATROPIUM BROMIDE AND ALBUTEROL SULFATE 1 AMPULE: .5; 3 SOLUTION RESPIRATORY (INHALATION) at 06:56

## 2019-01-01 RX ADMIN — MORPHINE SULFATE 4 MG: 4 INJECTION, SOLUTION INTRAMUSCULAR; INTRAVENOUS at 11:25

## 2019-01-01 RX ADMIN — PIPERACILLIN SODIUM AND TAZOBACTAM SODIUM 3.38 G: 3; .375 INJECTION, POWDER, LYOPHILIZED, FOR SOLUTION INTRAVENOUS at 00:37

## 2019-01-01 RX ADMIN — LORAZEPAM 0.5 MG: 0.5 TABLET ORAL at 14:25

## 2019-01-01 RX ADMIN — DOCUSATE SODIUM 100 MG: 100 CAPSULE, LIQUID FILLED ORAL at 08:43

## 2019-01-01 RX ADMIN — QUETIAPINE FUMARATE 100 MG: 100 TABLET ORAL at 19:50

## 2019-01-01 RX ADMIN — MIDODRINE HYDROCHLORIDE 10 MG: 10 TABLET ORAL at 22:29

## 2019-01-01 RX ADMIN — ENOXAPARIN SODIUM 40 MG: 40 INJECTION SUBCUTANEOUS at 08:57

## 2019-01-01 RX ADMIN — TRAMADOL HYDROCHLORIDE 50 MG: 50 TABLET, COATED ORAL at 09:14

## 2019-01-01 RX ADMIN — MIDODRINE HYDROCHLORIDE 10 MG: 10 TABLET ORAL at 23:05

## 2019-01-01 RX ADMIN — POTASSIUM CHLORIDE 40 MEQ: 1500 TABLET, EXTENDED RELEASE ORAL at 03:02

## 2019-01-01 RX ADMIN — METHYLPREDNISOLONE SODIUM SUCCINATE 30 MG: 40 INJECTION, POWDER, FOR SOLUTION INTRAMUSCULAR; INTRAVENOUS at 16:19

## 2019-01-01 RX ADMIN — METHYLPREDNISOLONE SODIUM SUCCINATE 40 MG: 40 INJECTION, POWDER, FOR SOLUTION INTRAMUSCULAR; INTRAVENOUS at 00:30

## 2019-01-01 RX ADMIN — LORAZEPAM 0.5 MG: 0.5 TABLET ORAL at 23:36

## 2019-01-01 RX ADMIN — METHYLPREDNISOLONE SODIUM SUCCINATE 30 MG: 40 INJECTION, POWDER, FOR SOLUTION INTRAMUSCULAR; INTRAVENOUS at 08:17

## 2019-01-01 RX ADMIN — ESCITALOPRAM OXALATE 10 MG: 10 TABLET ORAL at 12:47

## 2019-01-01 RX ADMIN — BUSPIRONE HYDROCHLORIDE 10 MG: 10 TABLET ORAL at 19:46

## 2019-01-01 RX ADMIN — IPRATROPIUM BROMIDE 0.5 MG: 0.5 SOLUTION RESPIRATORY (INHALATION) at 07:28

## 2019-01-01 RX ADMIN — MULTIPLE VITAMINS W/ MINERALS TAB 1 TABLET: TAB at 09:44

## 2019-01-01 RX ADMIN — METHYLPREDNISOLONE SODIUM SUCCINATE 40 MG: 40 INJECTION, POWDER, FOR SOLUTION INTRAMUSCULAR; INTRAVENOUS at 04:40

## 2019-01-01 RX ADMIN — QUETIAPINE FUMARATE 100 MG: 100 TABLET ORAL at 20:40

## 2019-01-01 RX ADMIN — FAMOTIDINE 20 MG: 20 TABLET ORAL at 08:57

## 2019-01-01 RX ADMIN — ESMOLOL HYDROCHLORIDE 20 MG: 10 INJECTION, SOLUTION INTRAVENOUS at 14:40

## 2019-01-01 RX ADMIN — FAMOTIDINE 20 MG: 20 TABLET ORAL at 08:43

## 2019-01-01 RX ADMIN — IPRATROPIUM BROMIDE 0.5 MG: 0.5 SOLUTION RESPIRATORY (INHALATION) at 15:13

## 2019-01-01 RX ADMIN — MOMETASONE FUROATE AND FORMOTEROL FUMARATE DIHYDRATE 2 PUFF: 200; 5 AEROSOL RESPIRATORY (INHALATION) at 20:49

## 2019-01-01 RX ADMIN — IPRATROPIUM BROMIDE AND ALBUTEROL SULFATE 1 AMPULE: .5; 3 SOLUTION RESPIRATORY (INHALATION) at 11:49

## 2019-01-01 RX ADMIN — OXYCODONE HYDROCHLORIDE AND ACETAMINOPHEN 1 TABLET: 5; 325 TABLET ORAL at 00:15

## 2019-01-01 RX ADMIN — SIMVASTATIN 40 MG: 40 TABLET, FILM COATED ORAL at 23:06

## 2019-01-01 RX ADMIN — INFLUENZA A VIRUS A/BRISBANE/02/2018 IVR-190 (H1N1) ANTIGEN (PROPIOLACTONE INACTIVATED), INFLUENZA A VIRUS A/KANSAS/14/2017 X-327 (H3N2) ANTIGEN (PROPIOLACTONE INACTIVATED), INFLUENZA B VIRUS B/MARYLAND/15/2016 ANTIGEN (PROPIOLACTONE INACTIVATED), INFLUENZA B VIRUS B/PHUKET/3073/2013 BVR-1B ANTIGEN (PROPIOLACTONE INACTIVATED) 0.5 ML: 15; 15; 15; 15 INJECTION, SUSPENSION INTRAMUSCULAR at 14:52

## 2019-01-01 RX ADMIN — SODIUM CHLORIDE TAB 1 GM 1 G: 1 TAB at 17:37

## 2019-01-01 RX ADMIN — SODIUM CHLORIDE TAB 1 GM 1 G: 1 TAB at 12:38

## 2019-01-01 RX ADMIN — MORPHINE SULFATE 2 MG: 2 INJECTION, SOLUTION INTRAMUSCULAR; INTRAVENOUS at 23:54

## 2019-01-01 RX ADMIN — MIDODRINE HYDROCHLORIDE 10 MG: 10 TABLET ORAL at 09:02

## 2019-01-01 RX ADMIN — IPRATROPIUM BROMIDE AND ALBUTEROL SULFATE 1 AMPULE: .5; 3 SOLUTION RESPIRATORY (INHALATION) at 10:36

## 2019-01-01 RX ADMIN — PIPERACILLIN SODIUM AND TAZOBACTAM SODIUM 3.38 G: 3; .375 INJECTION, POWDER, LYOPHILIZED, FOR SOLUTION INTRAVENOUS at 02:36

## 2019-01-01 RX ADMIN — Medication 1500 MG: at 11:34

## 2019-01-01 RX ADMIN — BUSPIRONE HYDROCHLORIDE 10 MG: 10 TABLET ORAL at 08:47

## 2019-01-01 RX ADMIN — IPRATROPIUM BROMIDE AND ALBUTEROL SULFATE 1 AMPULE: .5; 3 SOLUTION RESPIRATORY (INHALATION) at 10:45

## 2019-01-01 RX ADMIN — MOMETASONE FUROATE AND FORMOTEROL FUMARATE DIHYDRATE 2 PUFF: 200; 5 AEROSOL RESPIRATORY (INHALATION) at 08:40

## 2019-01-01 RX ADMIN — METHYLPREDNISOLONE SODIUM SUCCINATE 30 MG: 40 INJECTION, POWDER, FOR SOLUTION INTRAMUSCULAR; INTRAVENOUS at 01:01

## 2019-01-01 RX ADMIN — GADOTERIDOL 15 ML: 279.3 INJECTION, SOLUTION INTRAVENOUS at 21:00

## 2019-01-01 RX ADMIN — SODIUM CHLORIDE TAB 1 GM 1 G: 1 TAB at 18:12

## 2019-01-01 RX ADMIN — FAMOTIDINE 20 MG: 20 TABLET ORAL at 23:06

## 2019-01-01 RX ADMIN — BUSPIRONE HYDROCHLORIDE 10 MG: 10 TABLET ORAL at 09:04

## 2019-01-01 RX ADMIN — BUSPIRONE HYDROCHLORIDE 10 MG: 10 TABLET ORAL at 21:27

## 2019-01-01 RX ADMIN — QUETIAPINE FUMARATE 100 MG: 100 TABLET ORAL at 21:27

## 2019-01-01 RX ADMIN — BUSPIRONE HYDROCHLORIDE 10 MG: 10 TABLET ORAL at 08:48

## 2019-01-01 RX ADMIN — TRAMADOL HYDROCHLORIDE 50 MG: 50 TABLET, COATED ORAL at 21:39

## 2019-01-01 RX ADMIN — IPRATROPIUM BROMIDE AND ALBUTEROL SULFATE 1 AMPULE: .5; 3 SOLUTION RESPIRATORY (INHALATION) at 16:01

## 2019-01-01 RX ADMIN — ONDANSETRON 4 MG: 4 TABLET, ORALLY DISINTEGRATING ORAL at 12:15

## 2019-01-01 RX ADMIN — GLUCAGON HYDROCHLORIDE 0.5 MG: KIT at 14:54

## 2019-01-01 RX ADMIN — METHYLPREDNISOLONE SODIUM SUCCINATE 40 MG: 40 INJECTION, POWDER, FOR SOLUTION INTRAMUSCULAR; INTRAVENOUS at 09:30

## 2019-01-01 RX ADMIN — QUETIAPINE FUMARATE 100 MG: 100 TABLET ORAL at 00:28

## 2019-01-01 RX ADMIN — PREDNISONE 20 MG: 20 TABLET ORAL at 08:43

## 2019-01-01 RX ADMIN — SODIUM CHLORIDE TAB 1 GM 1 G: 1 TAB at 16:54

## 2019-01-01 RX ADMIN — QUETIAPINE FUMARATE 100 MG: 100 TABLET ORAL at 20:03

## 2019-01-01 RX ADMIN — IPRATROPIUM BROMIDE AND ALBUTEROL SULFATE 1 AMPULE: .5; 3 SOLUTION RESPIRATORY (INHALATION) at 11:17

## 2019-01-01 RX ADMIN — TOLVAPTAN 7.5 MG: 15 TABLET ORAL at 14:18

## 2019-01-01 RX ADMIN — BUSPIRONE HYDROCHLORIDE 10 MG: 10 TABLET ORAL at 08:50

## 2019-01-01 RX ADMIN — IPRATROPIUM BROMIDE 0.5 MG: 0.5 SOLUTION RESPIRATORY (INHALATION) at 19:41

## 2019-01-01 RX ADMIN — FAMOTIDINE 20 MG: 20 TABLET ORAL at 20:30

## 2019-01-01 RX ADMIN — MORPHINE SULFATE 2 MG: 2 INJECTION, SOLUTION INTRAMUSCULAR; INTRAVENOUS at 01:32

## 2019-01-01 RX ADMIN — METHYLPREDNISOLONE SODIUM SUCCINATE 30 MG: 40 INJECTION, POWDER, FOR SOLUTION INTRAMUSCULAR; INTRAVENOUS at 01:28

## 2019-01-01 RX ADMIN — LEVALBUTEROL 1.25 MG: 1.25 SOLUTION, CONCENTRATE RESPIRATORY (INHALATION) at 15:13

## 2019-01-01 RX ADMIN — METHYLPREDNISOLONE SODIUM SUCCINATE 40 MG: 40 INJECTION, POWDER, FOR SOLUTION INTRAMUSCULAR; INTRAVENOUS at 20:58

## 2019-01-01 RX ADMIN — CEPHALEXIN 500 MG: 500 CAPSULE ORAL at 18:16

## 2019-01-01 RX ADMIN — TOLVAPTAN 7.5 MG: 15 TABLET ORAL at 09:38

## 2019-01-01 RX ADMIN — METHYLPREDNISOLONE SODIUM SUCCINATE 40 MG: 40 INJECTION, POWDER, FOR SOLUTION INTRAMUSCULAR; INTRAVENOUS at 06:31

## 2019-01-01 RX ADMIN — MULTIPLE VITAMINS W/ MINERALS TAB 1 TABLET: TAB at 08:48

## 2019-01-01 RX ADMIN — BUSPIRONE HYDROCHLORIDE 10 MG: 10 TABLET ORAL at 13:45

## 2019-01-01 RX ADMIN — METHYLPREDNISOLONE SODIUM SUCCINATE 40 MG: 40 INJECTION, POWDER, FOR SOLUTION INTRAMUSCULAR; INTRAVENOUS at 06:14

## 2019-01-01 RX ADMIN — LORAZEPAM 0.5 MG: 0.5 TABLET ORAL at 20:40

## 2019-01-01 RX ADMIN — ACETAMINOPHEN 650 MG: 325 TABLET, FILM COATED ORAL at 08:43

## 2019-01-01 RX ADMIN — TRAMADOL HYDROCHLORIDE 50 MG: 50 TABLET, COATED ORAL at 08:50

## 2019-01-01 RX ADMIN — SODIUM CHLORIDE 1000 ML: 9 INJECTION, SOLUTION INTRAVENOUS at 21:50

## 2019-01-01 RX ADMIN — SODIUM CHLORIDE 80 ML: 9 INJECTION, SOLUTION INTRAVENOUS at 11:45

## 2019-01-01 RX ADMIN — MIDODRINE HYDROCHLORIDE 10 MG: 10 TABLET ORAL at 08:43

## 2019-01-01 RX ADMIN — PREDNISONE 40 MG: 20 TABLET ORAL at 08:48

## 2019-01-01 RX ADMIN — METHYLPREDNISOLONE SODIUM SUCCINATE 40 MG: 40 INJECTION, POWDER, FOR SOLUTION INTRAMUSCULAR; INTRAVENOUS at 17:31

## 2019-01-01 RX ADMIN — FAMOTIDINE 20 MG: 20 TABLET ORAL at 21:27

## 2019-01-01 RX ADMIN — MIDODRINE HYDROCHLORIDE 10 MG: 10 TABLET ORAL at 08:56

## 2019-01-01 RX ADMIN — MULTIPLE VITAMINS W/ MINERALS TAB 1 TABLET: TAB at 09:02

## 2019-01-01 RX ADMIN — QUETIAPINE FUMARATE 100 MG: 100 TABLET ORAL at 20:41

## 2019-01-01 RX ADMIN — IPRATROPIUM BROMIDE AND ALBUTEROL SULFATE 1 AMPULE: .5; 3 SOLUTION RESPIRATORY (INHALATION) at 11:05

## 2019-01-01 RX ADMIN — DOCUSATE SODIUM 100 MG: 100 CAPSULE, LIQUID FILLED ORAL at 08:56

## 2019-01-01 RX ADMIN — SODIUM CHLORIDE: 9 INJECTION, SOLUTION INTRAVENOUS at 13:09

## 2019-01-01 RX ADMIN — Medication 10 ML: at 09:39

## 2019-01-01 RX ADMIN — DOCUSATE SODIUM 100 MG: 100 CAPSULE, LIQUID FILLED ORAL at 08:49

## 2019-01-01 RX ADMIN — ONDANSETRON 4 MG: 4 TABLET, ORALLY DISINTEGRATING ORAL at 17:42

## 2019-01-01 RX ADMIN — SODIUM CHLORIDE: 9 INJECTION, SOLUTION INTRAVENOUS at 09:29

## 2019-01-01 RX ADMIN — Medication 10 ML: at 08:46

## 2019-01-01 RX ADMIN — SODIUM CHLORIDE: 9 INJECTION, SOLUTION INTRAVENOUS at 22:35

## 2019-01-01 RX ADMIN — LIDOCAINE HYDROCHLORIDE 5 ML: 10 INJECTION, SOLUTION INFILTRATION; PERINEURAL at 09:51

## 2019-01-01 RX ADMIN — LEVALBUTEROL 1.25 MG: 1.25 SOLUTION, CONCENTRATE RESPIRATORY (INHALATION) at 07:50

## 2019-01-01 RX ADMIN — METOROPROLOL TARTRATE 1 MG: 5 INJECTION, SOLUTION INTRAVENOUS at 15:16

## 2019-01-01 RX ADMIN — SODIUM CHLORIDE 1000 ML: 9 INJECTION, SOLUTION INTRAVENOUS at 13:10

## 2019-01-01 RX ADMIN — BUSPIRONE HYDROCHLORIDE 10 MG: 10 TABLET ORAL at 14:44

## 2019-01-01 RX ADMIN — FAMOTIDINE 20 MG: 20 TABLET ORAL at 08:56

## 2019-01-01 RX ADMIN — MORPHINE SULFATE 2 MG: 2 INJECTION, SOLUTION INTRAMUSCULAR; INTRAVENOUS at 21:51

## 2019-01-01 RX ADMIN — LEVALBUTEROL 1.25 MG: 1.25 SOLUTION, CONCENTRATE RESPIRATORY (INHALATION) at 15:44

## 2019-01-01 RX ADMIN — CEPHALEXIN 500 MG: 500 CAPSULE ORAL at 23:06

## 2019-01-01 RX ADMIN — MORPHINE SULFATE 2 MG: 2 INJECTION, SOLUTION INTRAMUSCULAR; INTRAVENOUS at 06:00

## 2019-01-01 RX ADMIN — MIDAZOLAM 1 MG: 1 INJECTION INTRAMUSCULAR; INTRAVENOUS at 14:27

## 2019-01-01 RX ADMIN — IPRATROPIUM BROMIDE 0.5 MG: 0.5 SOLUTION RESPIRATORY (INHALATION) at 14:46

## 2019-01-01 RX ADMIN — METHYLPREDNISOLONE SODIUM SUCCINATE 40 MG: 40 INJECTION, POWDER, FOR SOLUTION INTRAMUSCULAR; INTRAVENOUS at 15:55

## 2019-01-01 RX ADMIN — Medication 10 ML: at 08:49

## 2019-01-01 RX ADMIN — BUSPIRONE HYDROCHLORIDE 10 MG: 10 TABLET ORAL at 11:30

## 2019-01-01 RX ADMIN — SODIUM CHLORIDE TAB 1 GM 1 G: 1 TAB at 09:05

## 2019-01-01 RX ADMIN — BUSPIRONE HYDROCHLORIDE 10 MG: 10 TABLET ORAL at 14:15

## 2019-01-01 RX ADMIN — Medication 1750 MG: at 05:37

## 2019-01-01 RX ADMIN — ONDANSETRON 4 MG: 4 TABLET, ORALLY DISINTEGRATING ORAL at 08:56

## 2019-01-01 RX ADMIN — SODIUM CHLORIDE TAB 1 GM 1 G: 1 TAB at 08:50

## 2019-01-01 RX ADMIN — IPRATROPIUM BROMIDE AND ALBUTEROL SULFATE 1 AMPULE: .5; 3 SOLUTION RESPIRATORY (INHALATION) at 11:00

## 2019-01-01 RX ADMIN — IOHEXOL 50 ML: 240 INJECTION, SOLUTION INTRATHECAL; INTRAVASCULAR; INTRAVENOUS; ORAL at 09:06

## 2019-01-01 RX ADMIN — IPRATROPIUM BROMIDE AND ALBUTEROL SULFATE 1 AMPULE: .5; 3 SOLUTION RESPIRATORY (INHALATION) at 14:54

## 2019-01-01 RX ADMIN — PIPERACILLIN SODIUM AND TAZOBACTAM SODIUM 3.38 G: 3; .375 INJECTION, POWDER, LYOPHILIZED, FOR SOLUTION INTRAVENOUS at 16:53

## 2019-01-01 RX ADMIN — TRAMADOL HYDROCHLORIDE 50 MG: 50 TABLET, COATED ORAL at 15:55

## 2019-01-01 RX ADMIN — SODIUM CHLORIDE TAB 1 GM 1 G: 1 TAB at 08:49

## 2019-01-01 RX ADMIN — LEVALBUTEROL 1.25 MG: 1.25 SOLUTION, CONCENTRATE RESPIRATORY (INHALATION) at 07:28

## 2019-01-01 RX ADMIN — METOROPROLOL TARTRATE 2 MG: 5 INJECTION, SOLUTION INTRAVENOUS at 15:51

## 2019-01-01 RX ADMIN — LIDOCAINE HYDROCHLORIDE AND EPINEPHRINE 10 ML: 10; 10 INJECTION, SOLUTION INFILTRATION; PERINEURAL at 09:59

## 2019-01-01 RX ADMIN — SODIUM CHLORIDE TAB 1 GM 1 G: 1 TAB at 17:29

## 2019-01-01 RX ADMIN — SODIUM CHLORIDE TAB 1 GM 1 G: 1 TAB at 16:12

## 2019-01-01 RX ADMIN — MORPHINE SULFATE 4 MG: 4 INJECTION, SOLUTION INTRAMUSCULAR; INTRAVENOUS at 14:23

## 2019-01-01 RX ADMIN — PIPERACILLIN SODIUM AND TAZOBACTAM SODIUM 3.38 G: 3; .375 INJECTION, POWDER, LYOPHILIZED, FOR SOLUTION INTRAVENOUS at 00:42

## 2019-01-01 RX ADMIN — MIDODRINE HYDROCHLORIDE 10 MG: 10 TABLET ORAL at 00:28

## 2019-01-01 RX ADMIN — IPRATROPIUM BROMIDE 0.5 MG: 0.5 SOLUTION RESPIRATORY (INHALATION) at 20:32

## 2019-01-01 RX ADMIN — IPRATROPIUM BROMIDE AND ALBUTEROL SULFATE 1 AMPULE: .5; 3 SOLUTION RESPIRATORY (INHALATION) at 15:52

## 2019-01-01 RX ADMIN — LORAZEPAM 0.5 MG: 0.5 TABLET ORAL at 11:50

## 2019-01-01 RX ADMIN — SIMVASTATIN 40 MG: 40 TABLET, FILM COATED ORAL at 20:03

## 2019-01-01 RX ADMIN — IPRATROPIUM BROMIDE 0.5 MG: 0.5 SOLUTION RESPIRATORY (INHALATION) at 14:57

## 2019-01-01 RX ADMIN — FAMOTIDINE 20 MG: 20 TABLET ORAL at 09:02

## 2019-01-01 RX ADMIN — POTASSIUM CHLORIDE 40 MEQ: 1500 TABLET, EXTENDED RELEASE ORAL at 10:21

## 2019-01-01 RX ADMIN — PIPERACILLIN SODIUM AND TAZOBACTAM SODIUM 3.38 G: 3; .375 INJECTION, POWDER, LYOPHILIZED, FOR SOLUTION INTRAVENOUS at 04:03

## 2019-01-01 RX ADMIN — MIDODRINE HYDROCHLORIDE 10 MG: 10 TABLET ORAL at 20:25

## 2019-01-01 RX ADMIN — PIPERACILLIN SODIUM AND TAZOBACTAM SODIUM 3.38 G: 3; .375 INJECTION, POWDER, LYOPHILIZED, FOR SOLUTION INTRAVENOUS at 10:17

## 2019-01-01 RX ADMIN — BUSPIRONE HYDROCHLORIDE 10 MG: 10 TABLET ORAL at 22:20

## 2019-01-01 RX ADMIN — IPRATROPIUM BROMIDE 0.5 MG: 0.5 SOLUTION RESPIRATORY (INHALATION) at 10:57

## 2019-01-01 RX ADMIN — LEVALBUTEROL 1.25 MG: 1.25 SOLUTION, CONCENTRATE RESPIRATORY (INHALATION) at 19:19

## 2019-01-01 RX ADMIN — SODIUM CHLORIDE TAB 1 GM 1 G: 1 TAB at 16:19

## 2019-01-01 RX ADMIN — SODIUM CHLORIDE TAB 1 GM 1 G: 1 TAB at 13:09

## 2019-01-01 RX ADMIN — IPRATROPIUM BROMIDE AND ALBUTEROL SULFATE 1 AMPULE: .5; 3 SOLUTION RESPIRATORY (INHALATION) at 11:22

## 2019-01-01 RX ADMIN — FAMOTIDINE 20 MG: 20 TABLET ORAL at 20:25

## 2019-01-01 RX ADMIN — SIMVASTATIN 40 MG: 40 TABLET, FILM COATED ORAL at 19:49

## 2019-01-01 RX ADMIN — Medication 10 ML: at 08:48

## 2019-01-01 RX ADMIN — MORPHINE SULFATE 4 MG: 4 INJECTION, SOLUTION INTRAMUSCULAR; INTRAVENOUS at 06:40

## 2019-01-01 RX ADMIN — CEFAZOLIN 1 G: 1 INJECTION, POWDER, FOR SOLUTION INTRAMUSCULAR; INTRAVENOUS at 10:04

## 2019-01-01 RX ADMIN — IPRATROPIUM BROMIDE AND ALBUTEROL SULFATE 1 AMPULE: .5; 3 SOLUTION RESPIRATORY (INHALATION) at 20:39

## 2019-01-01 RX ADMIN — ONDANSETRON 4 MG: 2 INJECTION INTRAMUSCULAR; INTRAVENOUS at 13:15

## 2019-01-01 RX ADMIN — PROPOFOL 25 MCG/KG/MIN: 10 INJECTION, EMULSION INTRAVENOUS at 14:37

## 2019-01-01 RX ADMIN — MORPHINE SULFATE 4 MG: 4 INJECTION, SOLUTION INTRAMUSCULAR; INTRAVENOUS at 08:54

## 2019-01-01 RX ADMIN — QUETIAPINE FUMARATE 100 MG: 100 TABLET ORAL at 23:05

## 2019-01-01 RX ADMIN — Medication 10 ML: at 11:32

## 2019-01-01 RX ADMIN — FAMOTIDINE 20 MG: 20 TABLET ORAL at 22:28

## 2019-01-01 RX ADMIN — FAMOTIDINE 20 MG: 20 TABLET ORAL at 08:49

## 2019-01-01 RX ADMIN — TRAMADOL HYDROCHLORIDE 50 MG: 50 TABLET, COATED ORAL at 13:45

## 2019-01-01 RX ADMIN — MORPHINE SULFATE 2 MG: 2 INJECTION, SOLUTION INTRAMUSCULAR; INTRAVENOUS at 17:12

## 2019-01-01 RX ADMIN — METHYLPREDNISOLONE SODIUM SUCCINATE 40 MG: 40 INJECTION, POWDER, FOR SOLUTION INTRAMUSCULAR; INTRAVENOUS at 16:53

## 2019-01-01 RX ADMIN — LORAZEPAM 0.5 MG: 0.5 TABLET ORAL at 22:50

## 2019-01-01 RX ADMIN — CEPHALEXIN 500 MG: 500 CAPSULE ORAL at 09:38

## 2019-01-01 RX ADMIN — SODIUM CHLORIDE: 9 INJECTION, SOLUTION INTRAVENOUS at 01:28

## 2019-01-01 RX ADMIN — Medication 0.2 MG: at 14:31

## 2019-01-01 RX ADMIN — IPRATROPIUM BROMIDE AND ALBUTEROL SULFATE 1 AMPULE: .5; 3 SOLUTION RESPIRATORY (INHALATION) at 06:59

## 2019-01-01 RX ADMIN — SODIUM CHLORIDE TAB 1 GM 1 G: 1 TAB at 12:11

## 2019-01-01 RX ADMIN — FAMOTIDINE 20 MG: 20 TABLET ORAL at 23:18

## 2019-01-01 RX ADMIN — MORPHINE SULFATE 2 MG: 2 INJECTION, SOLUTION INTRAMUSCULAR; INTRAVENOUS at 20:31

## 2019-01-01 RX ADMIN — IPRATROPIUM BROMIDE AND ALBUTEROL SULFATE 1 AMPULE: .5; 3 SOLUTION RESPIRATORY (INHALATION) at 07:40

## 2019-01-01 RX ADMIN — MULTIPLE VITAMINS W/ MINERALS TAB 1 TABLET: TAB at 09:38

## 2019-01-01 RX ADMIN — IPRATROPIUM BROMIDE AND ALBUTEROL SULFATE 1 AMPULE: .5; 3 SOLUTION RESPIRATORY (INHALATION) at 07:06

## 2019-01-01 RX ADMIN — LORAZEPAM 0.5 MG: 0.5 TABLET ORAL at 10:05

## 2019-01-01 RX ADMIN — SIMVASTATIN 40 MG: 40 TABLET, FILM COATED ORAL at 21:27

## 2019-01-01 RX ADMIN — ACETAMINOPHEN 650 MG: 325 TABLET, FILM COATED ORAL at 23:05

## 2019-01-01 RX ADMIN — FAMOTIDINE 20 MG: 20 TABLET ORAL at 09:38

## 2019-01-01 RX ADMIN — FENTANYL CITRATE 25 MCG: 50 INJECTION, SOLUTION INTRAMUSCULAR; INTRAVENOUS at 14:48

## 2019-01-01 RX ADMIN — METHYLPREDNISOLONE SODIUM SUCCINATE 40 MG: 40 INJECTION, POWDER, FOR SOLUTION INTRAMUSCULAR; INTRAVENOUS at 21:27

## 2019-01-01 RX ADMIN — KETAMINE HYDROCHLORIDE 20 MG: 10 INJECTION, SOLUTION INTRAMUSCULAR; INTRAVENOUS at 14:32

## 2019-01-01 RX ADMIN — BUSPIRONE HYDROCHLORIDE 10 MG: 10 TABLET ORAL at 17:05

## 2019-01-01 RX ADMIN — LEVALBUTEROL 1.25 MG: 1.25 SOLUTION, CONCENTRATE RESPIRATORY (INHALATION) at 19:17

## 2019-01-01 RX ADMIN — MOMETASONE FUROATE AND FORMOTEROL FUMARATE DIHYDRATE 2 PUFF: 200; 5 AEROSOL RESPIRATORY (INHALATION) at 09:04

## 2019-01-01 RX ADMIN — IPRATROPIUM BROMIDE AND ALBUTEROL SULFATE 1 AMPULE: .5; 3 SOLUTION RESPIRATORY (INHALATION) at 09:12

## 2019-01-01 RX ADMIN — PIPERACILLIN SODIUM AND TAZOBACTAM SODIUM 3.38 G: 3; .375 INJECTION, POWDER, LYOPHILIZED, FOR SOLUTION INTRAVENOUS at 08:56

## 2019-01-01 RX ADMIN — ONDANSETRON 4 MG: 4 TABLET, ORALLY DISINTEGRATING ORAL at 12:21

## 2019-01-01 RX ADMIN — LEVALBUTEROL 1.25 MG: 1.25 SOLUTION, CONCENTRATE RESPIRATORY (INHALATION) at 19:41

## 2019-01-01 RX ADMIN — SODIUM CHLORIDE TAB 1 GM 1 G: 1 TAB at 09:04

## 2019-01-01 RX ADMIN — PREDNISONE 20 MG: 20 TABLET ORAL at 08:57

## 2019-01-01 RX ADMIN — Medication 10 ML: at 08:47

## 2019-01-01 RX ADMIN — IPRATROPIUM BROMIDE AND ALBUTEROL SULFATE 1 AMPULE: .5; 3 SOLUTION RESPIRATORY (INHALATION) at 20:00

## 2019-01-01 RX ADMIN — PIPERACILLIN SODIUM AND TAZOBACTAM SODIUM 3.38 G: 3; .375 INJECTION, POWDER, LYOPHILIZED, FOR SOLUTION INTRAVENOUS at 01:01

## 2019-01-01 RX ADMIN — MIDODRINE HYDROCHLORIDE 10 MG: 10 TABLET ORAL at 20:58

## 2019-01-01 RX ADMIN — Medication 1500 MG: at 02:08

## 2019-01-01 RX ADMIN — PIPERACILLIN SODIUM AND TAZOBACTAM SODIUM 3.38 G: 3; .375 INJECTION, POWDER, LYOPHILIZED, FOR SOLUTION INTRAVENOUS at 08:57

## 2019-01-01 RX ADMIN — SODIUM CHLORIDE TAB 1 GM 1 G: 1 TAB at 08:58

## 2019-01-01 RX ADMIN — IPRATROPIUM BROMIDE 0.5 MG: 0.5 SOLUTION RESPIRATORY (INHALATION) at 14:56

## 2019-01-01 RX ADMIN — IPRATROPIUM BROMIDE 0.5 MG: 0.5 SOLUTION RESPIRATORY (INHALATION) at 15:43

## 2019-01-01 RX ADMIN — SIMVASTATIN 40 MG: 40 TABLET, FILM COATED ORAL at 20:41

## 2019-01-01 RX ADMIN — SIMVASTATIN 40 MG: 40 TABLET, FILM COATED ORAL at 19:50

## 2019-01-01 RX ADMIN — IPRATROPIUM BROMIDE AND ALBUTEROL SULFATE 1 AMPULE: .5; 3 SOLUTION RESPIRATORY (INHALATION) at 12:09

## 2019-01-01 RX ADMIN — PIPERACILLIN SODIUM AND TAZOBACTAM SODIUM 3.38 G: 3; .375 INJECTION, POWDER, LYOPHILIZED, FOR SOLUTION INTRAVENOUS at 16:01

## 2019-01-01 RX ADMIN — METHYLPREDNISOLONE SODIUM SUCCINATE 40 MG: 40 INJECTION, POWDER, FOR SOLUTION INTRAMUSCULAR; INTRAVENOUS at 08:46

## 2019-01-01 RX ADMIN — OXYCODONE HYDROCHLORIDE AND ACETAMINOPHEN 1 TABLET: 5; 325 TABLET ORAL at 14:43

## 2019-01-01 RX ADMIN — SODIUM CHLORIDE TAB 1 GM 1 G: 1 TAB at 08:47

## 2019-01-01 RX ADMIN — SODIUM CHLORIDE: 9 INJECTION, SOLUTION INTRAVENOUS at 00:42

## 2019-01-01 RX ADMIN — FENTANYL CITRATE 25 MCG: 50 INJECTION, SOLUTION INTRAMUSCULAR; INTRAVENOUS at 15:28

## 2019-01-01 RX ADMIN — KETAMINE HYDROCHLORIDE 20 MG: 10 INJECTION, SOLUTION INTRAMUSCULAR; INTRAVENOUS at 15:16

## 2019-01-01 RX ADMIN — IPRATROPIUM BROMIDE AND ALBUTEROL SULFATE 1 AMPULE: .5; 3 SOLUTION RESPIRATORY (INHALATION) at 16:25

## 2019-01-01 RX ADMIN — IPRATROPIUM BROMIDE AND ALBUTEROL SULFATE 1 AMPULE: .5; 3 SOLUTION RESPIRATORY (INHALATION) at 20:41

## 2019-01-01 RX ADMIN — ENOXAPARIN SODIUM 40 MG: 40 INJECTION SUBCUTANEOUS at 09:02

## 2019-01-01 RX ADMIN — TRAMADOL HYDROCHLORIDE 50 MG: 50 TABLET, COATED ORAL at 09:30

## 2019-01-01 RX ADMIN — LEVALBUTEROL 1.25 MG: 1.25 SOLUTION, CONCENTRATE RESPIRATORY (INHALATION) at 12:37

## 2019-01-01 RX ADMIN — Medication 10 ML: at 20:48

## 2019-01-01 RX ADMIN — PROMETHAZINE HYDROCHLORIDE 12.5 MG: 25 INJECTION INTRAMUSCULAR; INTRAVENOUS at 19:45

## 2019-01-01 RX ADMIN — SODIUM CHLORIDE TAB 1 GM 1 G: 1 TAB at 12:57

## 2019-01-01 RX ADMIN — METHYLPREDNISOLONE SODIUM SUCCINATE 40 MG: 40 INJECTION, POWDER, FOR SOLUTION INTRAMUSCULAR; INTRAVENOUS at 15:06

## 2019-01-01 RX ADMIN — IPRATROPIUM BROMIDE 0.5 MG: 0.5 SOLUTION RESPIRATORY (INHALATION) at 19:16

## 2019-01-01 RX ADMIN — CEPHALEXIN 500 MG: 500 CAPSULE ORAL at 00:28

## 2019-01-01 RX ADMIN — DOCUSATE SODIUM 100 MG: 100 CAPSULE, LIQUID FILLED ORAL at 14:18

## 2019-01-01 RX ADMIN — MORPHINE SULFATE 4 MG: 4 INJECTION, SOLUTION INTRAMUSCULAR; INTRAVENOUS at 04:15

## 2019-01-01 RX ADMIN — MOMETASONE FUROATE AND FORMOTEROL FUMARATE DIHYDRATE 2 PUFF: 200; 5 AEROSOL RESPIRATORY (INHALATION) at 20:25

## 2019-01-01 RX ADMIN — ENOXAPARIN SODIUM 40 MG: 40 INJECTION SUBCUTANEOUS at 09:38

## 2019-01-01 RX ADMIN — GLUCAGON HYDROCHLORIDE 0.5 MG: KIT at 15:21

## 2019-01-01 RX ADMIN — PIPERACILLIN SODIUM AND TAZOBACTAM SODIUM 3.38 G: 3; .375 INJECTION, POWDER, LYOPHILIZED, FOR SOLUTION INTRAVENOUS at 16:19

## 2019-01-01 RX ADMIN — LORAZEPAM 0.5 MG: 0.5 TABLET ORAL at 10:35

## 2019-01-01 RX ADMIN — SIMVASTATIN 40 MG: 40 TABLET, FILM COATED ORAL at 20:58

## 2019-01-01 RX ADMIN — QUETIAPINE FUMARATE 100 MG: 100 TABLET ORAL at 20:30

## 2019-01-01 RX ADMIN — ENOXAPARIN SODIUM 40 MG: 40 INJECTION SUBCUTANEOUS at 08:45

## 2019-01-01 RX ADMIN — CEPHALEXIN 500 MG: 500 CAPSULE ORAL at 15:06

## 2019-01-01 RX ADMIN — LEVALBUTEROL 1.25 MG: 1.25 SOLUTION, CONCENTRATE RESPIRATORY (INHALATION) at 14:57

## 2019-01-01 RX ADMIN — Medication 1500 MG: at 00:47

## 2019-01-01 RX ADMIN — BUSPIRONE HYDROCHLORIDE 10 MG: 10 TABLET ORAL at 14:52

## 2019-01-01 RX ADMIN — TRAMADOL HYDROCHLORIDE 50 MG: 50 TABLET, COATED ORAL at 23:49

## 2019-01-01 RX ADMIN — SODIUM CHLORIDE TAB 1 GM 1 G: 1 TAB at 13:45

## 2019-01-01 RX ADMIN — QUETIAPINE FUMARATE 100 MG: 100 TABLET ORAL at 19:49

## 2019-01-01 RX ADMIN — SODIUM CHLORIDE 80 ML: 9 INJECTION, SOLUTION INTRAVENOUS at 11:32

## 2019-01-01 RX ADMIN — OXYCODONE HYDROCHLORIDE AND ACETAMINOPHEN 1 TABLET: 5; 325 TABLET ORAL at 09:02

## 2019-01-01 RX ADMIN — METHYLPREDNISOLONE SODIUM SUCCINATE 40 MG: 40 INJECTION, POWDER, FOR SOLUTION INTRAMUSCULAR; INTRAVENOUS at 00:29

## 2019-01-01 RX ADMIN — MIDODRINE HYDROCHLORIDE 10 MG: 10 TABLET ORAL at 08:49

## 2019-01-01 RX ADMIN — MOMETASONE FUROATE AND FORMOTEROL FUMARATE DIHYDRATE 2 PUFF: 200; 5 AEROSOL RESPIRATORY (INHALATION) at 09:40

## 2019-01-01 RX ADMIN — MOMETASONE FUROATE AND FORMOTEROL FUMARATE DIHYDRATE 2 PUFF: 200; 5 AEROSOL RESPIRATORY (INHALATION) at 09:44

## 2019-01-01 RX ADMIN — LORAZEPAM 0.5 MG: 2 INJECTION INTRAMUSCULAR; INTRAVENOUS at 10:17

## 2019-01-01 RX ADMIN — SODIUM CHLORIDE TAB 1 GM 1 G: 1 TAB at 08:43

## 2019-01-01 RX ADMIN — ENOXAPARIN SODIUM 40 MG: 40 INJECTION SUBCUTANEOUS at 09:44

## 2019-01-01 RX ADMIN — IPRATROPIUM BROMIDE AND ALBUTEROL SULFATE 1 AMPULE: .5; 3 SOLUTION RESPIRATORY (INHALATION) at 19:45

## 2019-01-01 RX ADMIN — BUSPIRONE HYDROCHLORIDE 10 MG: 10 TABLET ORAL at 13:21

## 2019-01-01 RX ADMIN — CEPHALEXIN 500 MG: 500 CAPSULE ORAL at 20:58

## 2019-01-01 RX ADMIN — Medication 10 ML: at 22:00

## 2019-01-01 RX ADMIN — GLUCAGON HYDROCHLORIDE 0.5 MG: KIT at 15:36

## 2019-01-01 RX ADMIN — OXYCODONE HYDROCHLORIDE AND ACETAMINOPHEN 1 TABLET: 5; 325 TABLET ORAL at 12:24

## 2019-01-01 RX ADMIN — BUSPIRONE HYDROCHLORIDE 10 MG: 10 TABLET ORAL at 20:48

## 2019-01-01 RX ADMIN — OXYCODONE HYDROCHLORIDE AND ACETAMINOPHEN 1 TABLET: 5; 325 TABLET ORAL at 18:17

## 2019-01-01 RX ADMIN — PREDNISONE 20 MG: 20 TABLET ORAL at 08:49

## 2019-01-01 RX ADMIN — ONDANSETRON 4 MG: 4 TABLET, ORALLY DISINTEGRATING ORAL at 15:09

## 2019-01-01 RX ADMIN — BUSPIRONE HYDROCHLORIDE 10 MG: 10 TABLET ORAL at 08:42

## 2019-01-01 RX ADMIN — METOPROLOL TARTRATE 2.5 MG: 1 INJECTION, SOLUTION INTRAVENOUS at 23:54

## 2019-01-01 RX ADMIN — MOMETASONE FUROATE AND FORMOTEROL FUMARATE DIHYDRATE 2 PUFF: 200; 5 AEROSOL RESPIRATORY (INHALATION) at 08:42

## 2019-01-01 RX ADMIN — IPRATROPIUM BROMIDE 0.5 MG: 0.5 SOLUTION RESPIRATORY (INHALATION) at 10:55

## 2019-01-01 RX ADMIN — METOROPROLOL TARTRATE 1 MG: 5 INJECTION, SOLUTION INTRAVENOUS at 15:36

## 2019-01-01 RX ADMIN — BUSPIRONE HYDROCHLORIDE 10 MG: 10 TABLET ORAL at 15:55

## 2019-01-01 RX ADMIN — MIDODRINE HYDROCHLORIDE 10 MG: 10 TABLET ORAL at 20:48

## 2019-01-01 RX ADMIN — IPRATROPIUM BROMIDE 0.5 MG: 0.5 SOLUTION RESPIRATORY (INHALATION) at 08:30

## 2019-01-01 RX ADMIN — SIMVASTATIN 40 MG: 40 TABLET, FILM COATED ORAL at 20:48

## 2019-01-01 RX ADMIN — BUSPIRONE HYDROCHLORIDE 10 MG: 10 TABLET ORAL at 21:51

## 2019-01-01 RX ADMIN — SODIUM CHLORIDE: 9 INJECTION, SOLUTION INTRAVENOUS at 14:47

## 2019-01-01 RX ADMIN — TRAMADOL HYDROCHLORIDE 50 MG: 50 TABLET, COATED ORAL at 15:57

## 2019-01-01 RX ADMIN — SODIUM CHLORIDE TAB 1 GM 1 G: 1 TAB at 08:57

## 2019-01-01 RX ADMIN — MORPHINE SULFATE 2 MG: 2 INJECTION, SOLUTION INTRAMUSCULAR; INTRAVENOUS at 19:46

## 2019-01-01 RX ADMIN — SODIUM CHLORIDE TAB 1 GM 1 G: 1 TAB at 18:02

## 2019-01-01 RX ADMIN — Medication 10 ML: at 20:43

## 2019-01-01 RX ADMIN — PIPERACILLIN SODIUM AND TAZOBACTAM SODIUM 3.38 G: 3; .375 INJECTION, POWDER, LYOPHILIZED, FOR SOLUTION INTRAVENOUS at 15:55

## 2019-01-01 RX ADMIN — BUSPIRONE HYDROCHLORIDE 10 MG: 10 TABLET ORAL at 08:43

## 2019-01-01 RX ADMIN — METHYLPREDNISOLONE SODIUM SUCCINATE 30 MG: 40 INJECTION, POWDER, FOR SOLUTION INTRAMUSCULAR; INTRAVENOUS at 00:43

## 2019-01-01 RX ADMIN — MOMETASONE FUROATE AND FORMOTEROL FUMARATE DIHYDRATE 2 PUFF: 200; 5 AEROSOL RESPIRATORY (INHALATION) at 22:28

## 2019-01-01 RX ADMIN — MOMETASONE FUROATE AND FORMOTEROL FUMARATE DIHYDRATE 2 PUFF: 200; 5 AEROSOL RESPIRATORY (INHALATION) at 08:57

## 2019-01-01 RX ADMIN — TRAMADOL HYDROCHLORIDE 50 MG: 50 TABLET, COATED ORAL at 13:08

## 2019-01-01 RX ADMIN — METOPROLOL TARTRATE 2.5 MG: 1 INJECTION, SOLUTION INTRAVENOUS at 06:37

## 2019-01-01 RX ADMIN — FAMOTIDINE 20 MG: 20 TABLET ORAL at 09:44

## 2019-01-01 RX ADMIN — IPRATROPIUM BROMIDE AND ALBUTEROL SULFATE 1 AMPULE: .5; 3 SOLUTION RESPIRATORY (INHALATION) at 18:57

## 2019-01-01 RX ADMIN — BUSPIRONE HYDROCHLORIDE 10 MG: 10 TABLET ORAL at 19:55

## 2019-01-01 RX ADMIN — Medication 10 ML: at 20:25

## 2019-01-01 RX ADMIN — SODIUM CHLORIDE 1000 ML: 9 INJECTION, SOLUTION INTRAVENOUS at 18:51

## 2019-01-01 RX ADMIN — FAMOTIDINE 20 MG: 20 TABLET ORAL at 00:28

## 2019-01-01 RX ADMIN — BUSPIRONE HYDROCHLORIDE 10 MG: 10 TABLET ORAL at 23:26

## 2019-01-01 RX ADMIN — MOMETASONE FUROATE AND FORMOTEROL FUMARATE DIHYDRATE 2 PUFF: 200; 5 AEROSOL RESPIRATORY (INHALATION) at 20:41

## 2019-01-01 RX ADMIN — SODIUM CHLORIDE TAB 1 GM 1 G: 1 TAB at 11:51

## 2019-01-01 RX ADMIN — ONDANSETRON 4 MG: 4 TABLET, ORALLY DISINTEGRATING ORAL at 13:00

## 2019-01-01 RX ADMIN — SODIUM CHLORIDE TAB 1 GM 1 G: 1 TAB at 17:47

## 2019-01-01 RX ADMIN — SODIUM CHLORIDE TAB 1 GM 1 G: 1 TAB at 12:18

## 2019-01-01 RX ADMIN — LEVALBUTEROL 1.25 MG: 1.25 SOLUTION, CONCENTRATE RESPIRATORY (INHALATION) at 14:58

## 2019-01-01 RX ADMIN — QUETIAPINE FUMARATE 100 MG: 100 TABLET ORAL at 19:46

## 2019-01-01 RX ADMIN — Medication 10 ML: at 19:49

## 2019-01-01 RX ADMIN — IPRATROPIUM BROMIDE AND ALBUTEROL SULFATE 1 AMPULE: .5; 3 SOLUTION RESPIRATORY (INHALATION) at 20:33

## 2019-01-01 RX ADMIN — BUSPIRONE HYDROCHLORIDE 10 MG: 10 TABLET ORAL at 20:32

## 2019-01-01 RX ADMIN — IPRATROPIUM BROMIDE AND ALBUTEROL SULFATE 1 AMPULE: .5; 3 SOLUTION RESPIRATORY (INHALATION) at 13:36

## 2019-01-01 RX ADMIN — SODIUM CHLORIDE, PRESERVATIVE FREE 500 UNITS: 5 INJECTION INTRAVENOUS at 10:12

## 2019-01-01 RX ADMIN — FAMOTIDINE 20 MG: 20 TABLET ORAL at 09:05

## 2019-01-01 RX ADMIN — QUETIAPINE FUMARATE 100 MG: 100 TABLET ORAL at 20:48

## 2019-01-01 RX ADMIN — IOPAMIDOL 75 ML: 755 INJECTION, SOLUTION INTRAVENOUS at 11:32

## 2019-01-01 RX ADMIN — SODIUM CHLORIDE: 9 INJECTION, SOLUTION INTRAVENOUS at 21:08

## 2019-01-01 RX ADMIN — FAMOTIDINE 20 MG: 20 TABLET ORAL at 08:48

## 2019-01-01 RX ADMIN — Medication 10 ML: at 11:45

## 2019-01-01 RX ADMIN — IPRATROPIUM BROMIDE AND ALBUTEROL SULFATE 1 AMPULE: .5; 3 SOLUTION RESPIRATORY (INHALATION) at 15:29

## 2019-01-01 RX ADMIN — BUSPIRONE HYDROCHLORIDE 10 MG: 10 TABLET ORAL at 13:08

## 2019-01-01 RX ADMIN — SIMVASTATIN 40 MG: 40 TABLET, FILM COATED ORAL at 23:18

## 2019-01-01 RX ADMIN — SIMVASTATIN 40 MG: 40 TABLET, FILM COATED ORAL at 19:46

## 2019-01-01 RX ADMIN — FENTANYL CITRATE 25 MCG: 50 INJECTION, SOLUTION INTRAMUSCULAR; INTRAVENOUS at 15:44

## 2019-01-01 RX ADMIN — SIMVASTATIN 40 MG: 40 TABLET, FILM COATED ORAL at 20:30

## 2019-01-01 RX ADMIN — FAMOTIDINE 20 MG: 20 TABLET ORAL at 20:03

## 2019-01-01 RX ADMIN — SIMVASTATIN 40 MG: 40 TABLET, FILM COATED ORAL at 20:24

## 2019-01-01 RX ADMIN — MORPHINE SULFATE 2 MG: 2 INJECTION, SOLUTION INTRAMUSCULAR; INTRAVENOUS at 15:10

## 2019-01-01 RX ADMIN — SODIUM CHLORIDE TAB 1 GM 1 G: 1 TAB at 13:17

## 2019-01-01 RX ADMIN — LIDOCAINE HYDROCHLORIDE 5 ML: 10 INJECTION, SOLUTION INFILTRATION; PERINEURAL at 11:03

## 2019-01-01 RX ADMIN — SODIUM CHLORIDE TAB 1 GM 1 G: 1 TAB at 12:19

## 2019-01-01 RX ADMIN — MOMETASONE FUROATE AND FORMOTEROL FUMARATE DIHYDRATE 2 PUFF: 200; 5 AEROSOL RESPIRATORY (INHALATION) at 08:50

## 2019-01-01 RX ADMIN — MULTIPLE VITAMINS W/ MINERALS TAB 1 TABLET: TAB at 08:49

## 2019-01-01 RX ADMIN — CEPHALEXIN 500 MG: 500 CAPSULE ORAL at 09:02

## 2019-01-01 RX ADMIN — BUSPIRONE HYDROCHLORIDE 10 MG: 10 TABLET ORAL at 14:18

## 2019-01-01 RX ADMIN — SODIUM CHLORIDE TAB 1 GM 1 G: 1 TAB at 17:36

## 2019-01-01 RX ADMIN — BUSPIRONE HYDROCHLORIDE 10 MG: 10 TABLET ORAL at 20:25

## 2019-01-01 RX ADMIN — MORPHINE SULFATE 2 MG: 2 INJECTION, SOLUTION INTRAMUSCULAR; INTRAVENOUS at 08:56

## 2019-01-01 RX ADMIN — MORPHINE SULFATE 2 MG: 2 INJECTION, SOLUTION INTRAMUSCULAR; INTRAVENOUS at 19:49

## 2019-01-01 RX ADMIN — ESMOLOL HYDROCHLORIDE 20 MG: 10 INJECTION, SOLUTION INTRAVENOUS at 15:00

## 2019-01-01 RX ADMIN — QUETIAPINE FUMARATE 100 MG: 100 TABLET ORAL at 22:28

## 2019-01-01 RX ADMIN — BUSPIRONE HYDROCHLORIDE 10 MG: 10 TABLET ORAL at 22:28

## 2019-01-01 RX ADMIN — IPRATROPIUM BROMIDE AND ALBUTEROL SULFATE 1 AMPULE: .5; 3 SOLUTION RESPIRATORY (INHALATION) at 07:31

## 2019-01-01 RX ADMIN — PIPERACILLIN SODIUM AND TAZOBACTAM SODIUM 3.38 G: 3; .375 INJECTION, POWDER, LYOPHILIZED, FOR SOLUTION INTRAVENOUS at 09:08

## 2019-01-01 RX ADMIN — LEVALBUTEROL 1.25 MG: 1.25 SOLUTION, CONCENTRATE RESPIRATORY (INHALATION) at 10:57

## 2019-01-01 RX ADMIN — METOPROLOL TARTRATE 2.5 MG: 1 INJECTION, SOLUTION INTRAVENOUS at 11:35

## 2019-01-01 RX ADMIN — LEVALBUTEROL 1.25 MG: 1.25 SOLUTION, CONCENTRATE RESPIRATORY (INHALATION) at 14:51

## 2019-01-01 RX ADMIN — SODIUM CHLORIDE 500 ML: 9 INJECTION, SOLUTION INTRAVENOUS at 20:01

## 2019-01-01 RX ADMIN — BUSPIRONE HYDROCHLORIDE 10 MG: 10 TABLET ORAL at 20:41

## 2019-01-01 RX ADMIN — METHYLPREDNISOLONE SODIUM SUCCINATE 40 MG: 40 INJECTION, POWDER, FOR SOLUTION INTRAMUSCULAR; INTRAVENOUS at 21:51

## 2019-01-01 RX ADMIN — IPRATROPIUM BROMIDE AND ALBUTEROL SULFATE 1 AMPULE: .5; 3 SOLUTION RESPIRATORY (INHALATION) at 07:17

## 2019-01-01 RX ADMIN — SIMVASTATIN 40 MG: 40 TABLET, FILM COATED ORAL at 22:28

## 2019-01-01 RX ADMIN — METHYLPREDNISOLONE SODIUM SUCCINATE 40 MG: 40 INJECTION, POWDER, FOR SOLUTION INTRAMUSCULAR; INTRAVENOUS at 10:59

## 2019-01-01 RX ADMIN — MIDODRINE HYDROCHLORIDE 10 MG: 10 TABLET ORAL at 20:40

## 2019-01-01 RX ADMIN — METHYLPREDNISOLONE SODIUM SUCCINATE 40 MG: 40 INJECTION, POWDER, FOR SOLUTION INTRAMUSCULAR; INTRAVENOUS at 10:17

## 2019-01-01 RX ADMIN — MOMETASONE FUROATE AND FORMOTEROL FUMARATE DIHYDRATE 2 PUFF: 200; 5 AEROSOL RESPIRATORY (INHALATION) at 09:24

## 2019-01-01 RX ADMIN — LEVALBUTEROL 1.25 MG: 1.25 SOLUTION, CONCENTRATE RESPIRATORY (INHALATION) at 08:30

## 2019-01-01 RX ADMIN — ONDANSETRON 4 MG: 4 TABLET, ORALLY DISINTEGRATING ORAL at 18:35

## 2019-01-01 RX ADMIN — MULTIPLE VITAMINS W/ MINERALS TAB 1 TABLET: TAB at 08:56

## 2019-01-01 RX ADMIN — LORAZEPAM 0.5 MG: 0.5 TABLET ORAL at 08:48

## 2019-01-01 RX ADMIN — IPRATROPIUM BROMIDE AND ALBUTEROL SULFATE 1 AMPULE: .5; 3 SOLUTION RESPIRATORY (INHALATION) at 07:28

## 2019-01-01 RX ADMIN — IPRATROPIUM BROMIDE 0.5 MG: 0.5 SOLUTION RESPIRATORY (INHALATION) at 19:19

## 2019-01-01 RX ADMIN — MIDAZOLAM 1 MG: 1 INJECTION INTRAMUSCULAR; INTRAVENOUS at 14:23

## 2019-01-01 RX ADMIN — IPRATROPIUM BROMIDE AND ALBUTEROL SULFATE 1 AMPULE: .5; 3 SOLUTION RESPIRATORY (INHALATION) at 15:23

## 2019-01-01 RX ADMIN — MOMETASONE FUROATE AND FORMOTEROL FUMARATE DIHYDRATE 2 PUFF: 200; 5 AEROSOL RESPIRATORY (INHALATION) at 19:49

## 2019-01-01 RX ADMIN — Medication 1750 MG: at 03:18

## 2019-01-01 RX ADMIN — MORPHINE SULFATE 2 MG: 2 INJECTION, SOLUTION INTRAMUSCULAR; INTRAVENOUS at 04:55

## 2019-01-01 RX ADMIN — QUETIAPINE FUMARATE 100 MG: 100 TABLET ORAL at 20:25

## 2019-01-01 ASSESSMENT — PAIN DESCRIPTION - LOCATION
LOCATION: BACK
LOCATION: BACK
LOCATION: BACK;HIP
LOCATION: ABDOMEN;BACK
LOCATION: BACK
LOCATION: ABDOMEN;BACK
LOCATION: BACK
LOCATION: BACK
LOCATION: ABDOMEN
LOCATION: BACK
LOCATION: GENERALIZED
LOCATION: GENERALIZED
LOCATION: BACK
LOCATION: GENERALIZED
LOCATION: BACK

## 2019-01-01 ASSESSMENT — PAIN DESCRIPTION - PAIN TYPE
TYPE: CHRONIC PAIN
TYPE: ACUTE PAIN
TYPE: CHRONIC PAIN
TYPE: ACUTE PAIN

## 2019-01-01 ASSESSMENT — PAIN SCALES - GENERAL
PAINLEVEL_OUTOF10: 9
PAINLEVEL_OUTOF10: 9
PAINLEVEL_OUTOF10: 7
PAINLEVEL_OUTOF10: 8
PAINLEVEL_OUTOF10: 9
PAINLEVEL_OUTOF10: 10
PAINLEVEL_OUTOF10: 9
PAINLEVEL_OUTOF10: 8
PAINLEVEL_OUTOF10: 5
PAINLEVEL_OUTOF10: 10
PAINLEVEL_OUTOF10: 7
PAINLEVEL_OUTOF10: 8
PAINLEVEL_OUTOF10: 8
PAINLEVEL_OUTOF10: 9
PAINLEVEL_OUTOF10: 10
PAINLEVEL_OUTOF10: 9
PAINLEVEL_OUTOF10: 0
PAINLEVEL_OUTOF10: 7
PAINLEVEL_OUTOF10: 9
PAINLEVEL_OUTOF10: 9
PAINLEVEL_OUTOF10: 10
PAINLEVEL_OUTOF10: 9
PAINLEVEL_OUTOF10: 5
PAINLEVEL_OUTOF10: 8
PAINLEVEL_OUTOF10: 8
PAINLEVEL_OUTOF10: 9
PAINLEVEL_OUTOF10: 10
PAINLEVEL_OUTOF10: 0
PAINLEVEL_OUTOF10: 10
PAINLEVEL_OUTOF10: 7
PAINLEVEL_OUTOF10: 0
PAINLEVEL_OUTOF10: 8
PAINLEVEL_OUTOF10: 10
PAINLEVEL_OUTOF10: 6
PAINLEVEL_OUTOF10: 7
PAINLEVEL_OUTOF10: 3
PAINLEVEL_OUTOF10: 8
PAINLEVEL_OUTOF10: 8
PAINLEVEL_OUTOF10: 9
PAINLEVEL_OUTOF10: 10
PAINLEVEL_OUTOF10: 7
PAINLEVEL_OUTOF10: 0
PAINLEVEL_OUTOF10: 9
PAINLEVEL_OUTOF10: 10
PAINLEVEL_OUTOF10: 6
PAINLEVEL_OUTOF10: 10
PAINLEVEL_OUTOF10: 9
PAINLEVEL_OUTOF10: 9
PAINLEVEL_OUTOF10: 6
PAINLEVEL_OUTOF10: 9
PAINLEVEL_OUTOF10: 10
PAINLEVEL_OUTOF10: 7

## 2019-01-01 ASSESSMENT — ENCOUNTER SYMPTOMS
RHINORRHEA: 0
COLOR CHANGE: 0
COUGH: 0
DIARRHEA: 0
BACK PAIN: 0
ABDOMINAL DISTENTION: 0
VOMITING: 1
EYE PAIN: 0
COUGH: 0
CHEST TIGHTNESS: 0
DIARRHEA: 0
BLOOD IN STOOL: 0
BACK PAIN: 0
SHORTNESS OF BREATH: 1
CHEST TIGHTNESS: 0
BLOOD IN STOOL: 0
COLOR CHANGE: 0
EYE PAIN: 0
CHOKING: 0
ABDOMINAL PAIN: 0
ABDOMINAL DISTENTION: 0
EYES NEGATIVE: 1
NAUSEA: 1
SHORTNESS OF BREATH: 1
EYE ITCHING: 0
APNEA: 0
DIARRHEA: 0
COUGH: 1
COUGH: 1
CONSTIPATION: 0
EYE DISCHARGE: 0
SINUS PAIN: 0
SHORTNESS OF BREATH: 0
ABDOMINAL PAIN: 0
NAUSEA: 1
COUGH: 0
SHORTNESS OF BREATH: 0
ABDOMINAL PAIN: 0
EYE DISCHARGE: 0
VOICE CHANGE: 0
GASTROINTESTINAL NEGATIVE: 1
EYE REDNESS: 0
DIARRHEA: 0
COLOR CHANGE: 0
CHOKING: 0
CHEST TIGHTNESS: 0
BACK PAIN: 0
NAUSEA: 1
CONSTIPATION: 0
EYE PAIN: 0
EYE DISCHARGE: 0
BACK PAIN: 0
ABDOMINAL PAIN: 0
EYE DISCHARGE: 0
EYE ITCHING: 0
SHORTNESS OF BREATH: 0
VOMITING: 0
DIARRHEA: 0
ABDOMINAL PAIN: 0
CHOKING: 0
EYE REDNESS: 0
COLOR CHANGE: 0
APNEA: 0
NAUSEA: 1
EYE PAIN: 0
EYE ITCHING: 0
VOMITING: 1
ABDOMINAL DISTENTION: 0
SHORTNESS OF BREATH: 1
EYE ITCHING: 0
COLOR CHANGE: 0
EYE PAIN: 0
BACK PAIN: 0
CONSTIPATION: 0
EYE DISCHARGE: 0
APNEA: 0
VOMITING: 0
ABDOMINAL DISTENTION: 0
NAUSEA: 1
BACK PAIN: 0
TROUBLE SWALLOWING: 0
BACK PAIN: 1
CONSTIPATION: 1
CHEST TIGHTNESS: 0
CHOKING: 0
BLOOD IN STOOL: 0
ABDOMINAL PAIN: 0
CHEST TIGHTNESS: 0
VOMITING: 1
CONSTIPATION: 0
EYE REDNESS: 0
BLOOD IN STOOL: 0
SHORTNESS OF BREATH: 1
EYE REDNESS: 0
EYE ITCHING: 0
RESPIRATORY NEGATIVE: 1
SORE THROAT: 0
ABDOMINAL PAIN: 0
APNEA: 0
COUGH: 0
BLOOD IN STOOL: 0

## 2019-01-01 ASSESSMENT — PULMONARY FUNCTION TESTS
PIF_VALUE: 1
PIF_VALUE: 0
PIF_VALUE: 1
PIF_VALUE: 0
PIF_VALUE: 1
PIF_VALUE: 0
PIF_VALUE: 1
PIF_VALUE: 0
PIF_VALUE: 1
PIF_VALUE: 0
PIF_VALUE: 1
PIF_VALUE: 0
PIF_VALUE: 1
PIF_VALUE: 0
PIF_VALUE: 1

## 2019-01-01 ASSESSMENT — PAIN DESCRIPTION - ORIENTATION
ORIENTATION: RIGHT;LEFT;LOWER
ORIENTATION: RIGHT;LEFT;LOWER
ORIENTATION: LOWER
ORIENTATION: LOWER;RIGHT;LEFT
ORIENTATION: LOWER
ORIENTATION: RIGHT;LEFT;LOWER

## 2019-01-01 ASSESSMENT — PAIN DESCRIPTION - FREQUENCY
FREQUENCY: CONTINUOUS
FREQUENCY: INTERMITTENT
FREQUENCY: INTERMITTENT
FREQUENCY: CONTINUOUS

## 2019-01-01 ASSESSMENT — PAIN SCALES - WONG BAKER
WONGBAKER_NUMERICALRESPONSE: 0

## 2019-01-01 ASSESSMENT — PAIN - FUNCTIONAL ASSESSMENT
PAIN_FUNCTIONAL_ASSESSMENT: PREVENTS OR INTERFERES WITH MANY ACTIVE NOT PASSIVE ACTIVITIES
PAIN_FUNCTIONAL_ASSESSMENT: PREVENTS OR INTERFERES SOME ACTIVE ACTIVITIES AND ADLS
PAIN_FUNCTIONAL_ASSESSMENT: 0-10
PAIN_FUNCTIONAL_ASSESSMENT: PREVENTS OR INTERFERES SOME ACTIVE ACTIVITIES AND ADLS

## 2019-01-01 ASSESSMENT — PATIENT HEALTH QUESTIONNAIRE - PHQ9
SUM OF ALL RESPONSES TO PHQ QUESTIONS 1-9: 0
2. FEELING DOWN, DEPRESSED OR HOPELESS: 0
SUM OF ALL RESPONSES TO PHQ QUESTIONS 1-9: 0
1. LITTLE INTEREST OR PLEASURE IN DOING THINGS: 0
SUM OF ALL RESPONSES TO PHQ9 QUESTIONS 1 & 2: 0

## 2019-01-01 ASSESSMENT — PAIN DESCRIPTION - PROGRESSION
CLINICAL_PROGRESSION: NOT CHANGED
CLINICAL_PROGRESSION: GRADUALLY IMPROVING
CLINICAL_PROGRESSION: NOT CHANGED

## 2019-01-01 ASSESSMENT — PAIN DESCRIPTION - DESCRIPTORS
DESCRIPTORS: ACHING
DESCRIPTORS: ACHING;DISCOMFORT
DESCRIPTORS: ACHING
DESCRIPTORS: ACHING;DISCOMFORT
DESCRIPTORS: DISCOMFORT;ACHING
DESCRIPTORS: ACHING
DESCRIPTORS: ACHING

## 2019-01-01 ASSESSMENT — PAIN DESCRIPTION - ONSET
ONSET: ON-GOING
ONSET: SUDDEN
ONSET: ON-GOING

## 2019-01-01 ASSESSMENT — LIFESTYLE VARIABLES: SMOKING_STATUS: 1

## 2019-07-19 PROBLEM — E66.9 OBESITY (BMI 35.0-39.9 WITHOUT COMORBIDITY): Status: ACTIVE | Noted: 2019-01-01

## 2019-07-19 NOTE — PROGRESS NOTES
[address] [address]  Dept: [phone]  Dept Fax: [phone]    OfficeProgress/Follow Up Note  Date of patient's visit: 7/19/2019  Patient's Name:  Karyle Lieu YOB: 1955            Patient Care Team:  Sanjuanita Alvares MD as PCP - General (Internal Medicine)  Sanjuanita Alvares MD as PCP - [facility]  Norah Burns MD as Consulting Physician (Orthopedic Surgery)  Lynn Almanza MD as Consulting Physician (Urology)  Gail Mendoza, RN as Nurse Navigator    REASON FOR VISIT:  Routine outpatient follow up    HISTORY OF PRESENT ILLNESS:      Chief Complaint   Patient presents with    Results     pt would like to go over blood     Dizziness     on and off, week ago pt stated she was driving and became dizzy and vomitted      History was obtained from the patient. Karyle Lieu is a 61 y.o. female here today for follow up on chronic medial conditions below. Dizziness. Symptoms occur on/off. Episodes last approximately 5-10 minutes. Associated with nausea and vomiting. No hearing loss, tinnitus, headache. Bipolar disorder. Increasing depression recently. Stress due to loss of sister. Reports fair sleep, appetite normal.  No suicidal thoughts. Obesity. Patient reports recent weight gain. Admits to associated fatigue.      Patient Active Problem List   Diagnosis    COPD (chronic obstructive pulmonary disease) ([address]    Atypical depressive disorder    Mitral valve disorder    Other hyperlipidemia    Temporomandibular joint disorder    Vitamin D deficiency    Tobacco abuse    Precordial pain    Adverse drug effect    Idiopathic hypotension    Sweating abnormality    Mild cognitive disorder    Idiopathic hypotension    Contusion of left back wall of thorax    Pain at rest    Inability to ambulate due to knee    Fall with injury    Bipolar disorder in full remission ([address]    Acute cystitis without

## 2019-09-25 PROBLEM — E87.1 HYPONATREMIA: Status: ACTIVE | Noted: 2019-01-01

## 2019-09-25 NOTE — ED PROVIDER NOTES
EMERGENCY DEPARTMENT ENCOUNTER    Pt Name: Kera Moyer  MRN: [id number]  [place] 1955  Date of evaluation: 9/25/19  CHIEF COMPLAINT       Chief Complaint   Patient presents with    Nausea & Vomiting    Fatigue     HISTORY OF PRESENT ILLNESS   The pt presents for evaluation of weakness and fatigue. Symptoms have been ongoing for over a week. Gradual onset, gradually worsening. Pt was recently seen and diagnosed with uti, pt has been on abx without improvement in her generalized symptoms but improvement in dysuria. No fever. No focal weakness or numbness. No speech changes, facial droop, changes in swallowing or other focal neuro changes. Pt states she has been nauseated and not eating well. The history is provided by the patient. Fatigue   Severity:  Mild  Onset quality:  Gradual  Duration:  1 week  Timing:  Constant  Progression:  Worsening  Chronicity:  New  Relieved by:  Nothing  Worsened by:  Nothing  Associated symptoms: no abdominal pain, no chest pain, no cough, no dizziness, no headaches and no shortness of breath        REVIEW OF SYSTEMS     Review of Systems   Constitutional: Positive for fatigue. HENT: Negative. Negative for congestion. Eyes: Negative. Respiratory: Negative. Negative for cough and shortness of breath. Cardiovascular: Negative. Negative for chest pain. Gastrointestinal: Negative. Negative for abdominal pain. Genitourinary: Negative. Musculoskeletal: Negative. Negative for back pain. Skin: Negative. Negative for rash. Neurological: Negative for dizziness, weakness, numbness and headaches. All other systems reviewed and are negative.     PASTMEDICAL HISTORY     Past Medical History:   Diagnosis Date    Atypical depressive disorder     Hypotension, unspecified     Mitral valve disorders(424.0)     Obstructive chronic bronchitis with exacerbation (HCC)     Other and unspecified hyperlipidemia     Other malaise and fatigue     Temporomandibular joint disorders, unspecified     Unspecified breast disorder     Unspecified vitamin D deficiency      SURGICAL HISTORY       Past Surgical History:   Procedure Laterality Date    IRIS AND BSO  1995     CURRENT MEDICATIONS       Current Discharge Medication List      CONTINUE these medications which have NOT CHANGED    Details   cephALEXin (KEFLEX) 500 MG capsule Take 1 capsule by mouth 3 times daily for 10 days  Qty: 30 capsule, Refills: 0      ondansetron (ZOFRAN ODT) 4 MG disintegrating tablet Take 1 tablet by mouth every 8 hours as needed for Nausea  Qty: 12 tablet, Refills: 0      SYMBICORT 160-4.5 MCG/ACT AERO INHALE 2 PUFFS INTO THE LUNGS TWICE DAILY  Qty: 10.2 g, Refills: 6    Associated Diagnoses: Panlobular emphysema (HCC)      albuterol-ipratropium (COMBIVENT RESPIMAT)  MCG/ACT AERS inhaler INHALE 1 PUFF INTO THE LUNGS FOUR TIMES DAILY  Qty: 4 g, Refills: 3    Associated Diagnoses: Panlobular emphysema (HCC)      simvastatin (ZOCOR) 40 MG tablet TAKE 1 TABLET BY MOUTH EVERY NIGHT AT BEDTIME  Qty: 90 tablet, Refills: 3      Multiple Vitamins-Minerals (MULTIVITAMIN ADULT PO) Take 1 tablet by mouth daily       midodrine (PROAMATINE) 10 MG tablet TAKE 1 TABLET BY MOUTH TWICE DAILY  Qty: 180 tablet, Refills: 3      famotidine (PEPCID) 20 MG tablet TAKE 1 TABLET BY MOUTH TWICE DAILY  Qty: 180 tablet, Refills: 3    Comments: **Patient requests 90 days supply**  Associated Diagnoses: Hiatus hernia syndrome      albuterol sulfate HFA (VENTOLIN HFA) 108 (90 Base) MCG/ACT inhaler Inhale 2 puffs into the lungs every 6 hours as needed for Wheezing  Qty: 1 Inhaler, Refills: 3    Associated Diagnoses: Panlobular emphysema (HCC)      LORazepam (ATIVAN) 1 MG tablet Take 0.5 mg by mouth 2 times daily as needed for Anxiety.    Refills: 1      !! escitalopram (LEXAPRO) 10 MG tablet Take 1 tablet by mouth daily Indications: (Pt takes a 20 mg + a 10 mg = 30 mg total per day)  Qty: 30 tablet, Refills: 3 results. Pt has significant hyponatremia. Otherwise, results nonacute. Discussed with Dr. Alicia Mcleod and Dr. Jenne Brunner. Will admit for further therapy and evaluation. Recommended hydration with ns overnight. Pt is ready for admission at this time, exam unchanged on reeval.       CRITICAL CARE:       PROCEDURES:    Procedures    DIAGNOSTIC RESULTS   EKG:All EKG's are interpreted by the Emergency Department Physician who either signs or Co-signs this chart in the absence of a cardiologist.    Ekg, rate of 82, nsr, normal qrs, no st seg changes, no acute ischemic change. RADIOLOGY:All plain film, CT, MRI, and formal ultrasound images (except ED bedside ultrasound) are read by the radiologist, see reports below, unless otherwisenoted in MDM or here. No orders to display     LABS: All lab results were reviewed by myself, and all abnormals are listed below.   Labs Reviewed   CBC WITH AUTO DIFFERENTIAL - Abnormal; Notable for the following components:       Result Value    RDW 15.3 (*)     Seg Neutrophils 79 (*)     Lymphocytes 12 (*)     Monocytes 9 (*)     All other components within normal limits   COMPREHENSIVE METABOLIC PANEL - Abnormal; Notable for the following components:    Glucose 118 (*)     BUN 5 (*)     Sodium 117 (*)     Chloride 83 (*)     Alkaline Phosphatase 249 (*)     ALT 54 (*)     AST 54 (*)     All other components within normal limits   OSMOLALITY - Abnormal; Notable for the following components:    Serum Osmolality 246 (*)     All other components within normal limits   LIPASE   MAGNESIUM   TROPONIN   TROPONIN   SPECIMEN REJECTION   URINE RT REFLEX TO CULTURE   SODIUM, URINE, RANDOM       EMERGENCY DEPARTMENTCOURSE:         Vitals:    Vitals:    09/25/19 1945 09/25/19 2000 09/25/19 2047 09/25/19 2134   BP:  (!) 137/94 120/78 137/78   Pulse:   89 82   Resp:   18 16   Temp:   99.1 °F (37.3 °C) 97.9 °F (36.6 °C)   TempSrc:   Oral Oral   SpO2: 95%  96% 95%   Weight:    200 lb 2.8 oz (90.8 kg) Height:    5' 1\" (1.549 m)       The patient was given the following medications while in the emergency department:  Orders Placed This Encounter   Medications    0.9 % sodium chloride bolus    nicotine (NICODERM CQ) 14 MG/24HR 1 patch     CONSULTS:  IP CONSULT TO INTERNAL MEDICINE  IP CONSULT TO NEPHROLOGY    FINAL IMPRESSION      1.  Hyponatremia          DISPOSITION/PLAN   DISPOSITION Admitted 09/25/2019 07:17:36 PM      PATIENT REFERRED TO:  Riley Joshi MD   [address] [place] [id number]          DISCHARGE MEDICATIONS:  Current Discharge Medication List        uSzi Arvizu MD  Attending Emergency Physician                    Jes Gallardo MD  09/25/19 [id number]

## 2019-09-26 NOTE — ED NOTES
Report given to Arron Castaneda RN from [facility]. Report method by phone   The following was reviewed with receiving RN:   Current vital signs:  /78   Pulse 89   Temp 99.1 °F (37.3 °C) (Oral)   Resp 18   Ht 5' 1\" (1.549 m)   Wt 195 lb (88.5 kg)   SpO2 96%   BMI 36.84 kg/m²                MEWS Score: 1     Any medication or safety alerts were reviewed. Any pending diagnostics and notifications were also reviewed, as well as any safety concerns or issues, abnormal labs, abnormal imaging, and abnormal assessment findings. Questions were answered.             Kings Mccoy RN  09/25/19 3572

## 2019-09-26 NOTE — CARE COORDINATION
handle personal hygiene needs (bathing/dressing/grooming): Independent  Ability to manage medications: Independent  Ability to prepare food:  Independent  Ability to maintain home (clean home, laundry): Independent  Ability to drive and/or has transportation:  Dependent  Ability to do shopping:  Independent  Ability to manage finances: Independent  Is patient able to live independently?:  Yes  Hearing and Vision  Care Transitions Interventions         Follow Up  Future Appointments   Date Time Provider Caitlyn Chan   10/21/2019  3:45 PM Rosemary Lockett MD [address] Maintenance  There are no preventive care reminders to display for this patient.     Shiv Garcia RN

## 2019-09-26 NOTE — H&P
LEIDA Pickard 53    HISTORY AND PHYSICAL EXAMINATION            Date:   9/26/2019  Patient name:  Kimberli Cuevas  Date of admission:  9/25/2019  6:05 PM  MRN:   [id number]  Account:  [de-identified]  YOB: 1955  PCP:    Brandon Norton MD  Room:   2081/2081-01  Code Status:    Full Code    Chief Complaint:     Chief Complaint   Patient presents with    Nausea & Vomiting    Fatigue       History Obtained From:     patient, electronic medical record    History of Present Illness: The patient is a 59 y.o. Non-/non  female who presents with Nausea & Vomiting and Fatigue   and she is admitted to the hospital for the management of  Fatigue and Tiredness , q. Patient has multiple medical problems which include chronic smoking, COPD, gastroesophageal reflux disease, hiatus hernia, bipolar disorder. Patient mentioned that she has symptoms of pressure in her lower abdomen, dysuria. Symptoms are going on for last few days. She went to the emergency room. She was diagnosed with UTI and possible pyelonephritis. Patient was prescribed Keflex. Patient mentioned that she never felt better after even taking antibiotics. She is feeling extremely weak, fatigue, tired. She was throwing up. She was drinking a lot of water. She came to emergency room. She was found to have a sodium of 117. Her urine sodium was 102.   Patient is on Lexapro and Seroquel      Past Medical History:     Past Medical History:   Diagnosis Date    Atypical depressive disorder     Hypotension, unspecified     Mitral valve disorders(424.0)     Obstructive chronic bronchitis with exacerbation (HCC)     Other and unspecified hyperlipidemia     Other malaise and fatigue     Temporomandibular joint disorders, unspecified     Unspecified breast disorder     Unspecified vitamin D deficiency         Past Surgical History:     Past Surgical History:   Procedure Laterality Date    [facility] AND BSO  1995        Medications Prior to Admission:     Prior to Admission medications    Medication Sig Start Date End Date Taking? Authorizing Provider   cephALEXin (KEFLEX) 500 MG capsule Take 1 capsule by mouth 3 times daily for 10 days 9/19/19 9/29/19 Yes Agustin Metzger MD   ondansetron (ZOFRAN ODT) 4 MG disintegrating tablet Take 1 tablet by mouth every 8 hours as needed for Nausea 9/19/19  Yes Agustin Metzger MD   SYMBICORT 160-4.5 MCG/ACT AERO INHALE 2 PUFFS INTO THE LUNGS TWICE DAILY 9/3/19  Yes Santiago Leiva MD   simvastatin (ZOCOR) 40 MG tablet TAKE 1 TABLET BY MOUTH EVERY NIGHT AT BEDTIME 7/2/19  Yes Santiago Leiva MD   Multiple Vitamins-Minerals (MULTIVITAMIN ADULT PO) Take 1 tablet by mouth daily    Yes Historical Provider, MD   midodrine (PROAMATINE) 10 MG tablet TAKE 1 TABLET BY MOUTH TWICE DAILY 3/12/19  Yes Santiago Leiva MD   famotidine (PEPCID) 20 MG tablet TAKE 1 TABLET BY MOUTH TWICE DAILY 1/4/19  Yes Santiago Leiva MD   albuterol sulfate HFA (VENTOLIN HFA) 108 (90 Base) MCG/ACT inhaler Inhale 2 puffs into the lungs every 6 hours as needed for Wheezing 6/1/18  Yes Santiago Leiva MD   LORazepam (ATIVAN) 1 MG tablet Take 0.5 mg by mouth 2 times daily as needed for Anxiety. 4/22/17  Yes Historical Provider, MD   escitalopram (LEXAPRO) 10 MG tablet Take 1 tablet by mouth daily Indications: (Pt takes a 20 mg + a 10 mg = 30 mg total per day) 1/23/17  Yes Aditya Weston MD   escitalopram (LEXAPRO) 20 MG tablet Take 1 tablet by mouth daily Indications: (Pt takes a 20 mg + a 10 mg = 30 mg total per day) 1/23/17  Yes Aditya Weston MD   QUEtiapine (SEROQUEL) 100 MG tablet Take 100 mg by mouth nightly  8/23/16  Yes Historical Provider, MD        Allergies:     Vesicare [solifenacin]    Social History:     Tobacco:    reports that she has been smoking cigarettes. She has a 42.00 pack-year smoking history.  She has never used smokeless tobacco.  Alcohol:      reports that she ear, no discharge, hearing intact  Nose:  no drainage noted  Mouth: mucous membranes moist  Neck: supple, no carotid bruits, thyroid not palpable  Lungs: Air entry B/l decreased , B/L occasional wheezing Present   Cardiovascular: normal rate, regular rhythm, no murmur, gallop, rub.   Abdomen: Soft, nontender, nondistended, normal bowel sounds, no hepatomegaly or splenomegaly  Neurologic: There are no new focal motor or sensory deficits, normal muscle tone and bulk, no abnormal sensation, normal speech, cranial nerves II through XII grossly intact  Skin: No gross lesions, rashes, bruising or bleeding on exposed skin area  Extremities:  peripheral pulses palpable, no pedal edema or calf pain with palpation  Psych: normal affect     Investigations:      Laboratory Testing:  Recent Results (from the past 24 hour(s))   CBC Auto Differential    Collection Time: 09/25/19  6:20 PM   Result Value Ref Range    WBC 8.4 3.5 - 11.0 k/uL    RBC 4.33 4.0 - 5.2 m/uL    Hemoglobin 12.2 12.0 - 16.0 g/dL    Hematocrit 36.5 36 - 46 %    MCV 84.3 80 - 100 fL    MCH 28.2 26 - 34 pg    MCHC 33.5 31 - 37 g/dL    RDW 15.3 (H) 11.5 - 14.9 %    Platelets 431 842 - 592 k/uL    MPV 7.7 6.0 - 12.0 fL    NRBC Automated NOT REPORTED per 100 WBC    Differential Type NOT REPORTED     Seg Neutrophils 79 (H) 36 - 66 %    Lymphocytes 12 (L) 24 - 44 %    Monocytes 9 (H) 1 - 7 %    Eosinophils % 0 0 - 4 %    Basophils 0 0 - 2 %    Immature Granulocytes NOT REPORTED 0 %    Segs Absolute 6.60 1.3 - 9.1 k/uL    Absolute Lymph # 1.00 1.0 - 4.8 k/uL    Absolute Mono # 0.80 0.1 - 1.3 k/uL    Absolute Eos # 0.00 0.0 - 0.4 k/uL    Basophils Absolute 0.00 0.0 - 0.2 k/uL    Absolute Immature Granulocyte NOT REPORTED 0.00 - 0.30 k/uL    WBC Morphology NOT REPORTED     RBC Morphology NOT REPORTED     Platelet Estimate NOT REPORTED    Comprehensive Metabolic Panel    Collection Time: 09/25/19  6:20 PM   Result Value Ref Range    Glucose 118 (H) 70 - 99 mg/dL    BUN 5 (L) 8 - 23 mg/dL    CREATININE 0.69 0.50 - 0.90 mg/dL    Bun/Cre Ratio NOT REPORTED 9 - 20    Calcium 9.0 8.6 - 10.4 mg/dL    Sodium 117 (LL) 135 - 144 mmol/L    Potassium 3.9 3.7 - 5.3 mmol/L    Chloride 83 (L) 98 - 107 mmol/L    CO2 21 20 - 31 mmol/L    Anion Gap 13 9 - 17 mmol/L    Alkaline Phosphatase 249 (H) 35 - 104 U/L    ALT 54 (H) 5 - 33 U/L    AST 54 (H) <32 U/L    Total Bilirubin 0.47 0.3 - 1.2 mg/dL    Total Protein 6.7 6.4 - 8.3 g/dL    Alb 3.7 3.5 - 5.2 g/dL    Albumin/Globulin Ratio NOT REPORTED 1.0 - 2.5    GFR Non-African American >60 >60 mL/min    GFR African American >60 >60 mL/min    GFR Comment          GFR Staging NOT REPORTED    Lipase    Collection Time: 09/25/19  6:20 PM   Result Value Ref Range    Lipase 37 13 - 60 U/L   Magnesium    Collection Time: 09/25/19  6:20 PM   Result Value Ref Range    Magnesium 1.8 1.6 - 2.6 mg/dL   Troponin    Collection Time: 09/25/19  6:20 PM   Result Value Ref Range    Troponin, High Sensitivity 11 0 - 14 ng/L    Troponin T NOT REPORTED <0.03 ng/mL    Troponin Interp NOT REPORTED    SPECIMEN REJECTION    Collection Time: 09/25/19  6:20 PM   Result Value Ref Range    Specimen Source . BLOOD     Ordered Test OSMO     Reason for Rejection Unable to perform testing: Specimen hemolyzed.      - NOT REPORTED    Osmolality    Collection Time: 09/25/19  7:00 PM   Result Value Ref Range    Serum Osmolality 246 (LL) 275 - 295 mOsm/kg   Troponin    Collection Time: 09/25/19  8:30 PM   Result Value Ref Range    Troponin, High Sensitivity 12 0 - 14 ng/L    Troponin T NOT REPORTED <0.03 ng/mL    Troponin Interp NOT REPORTED    SODIUM, URINE, RANDOM    Collection Time: 09/26/19 12:15 AM   Result Value Ref Range    Sodium,Ur 102 mmol/L   Urinalysis Reflex to Culture    Collection Time: 09/26/19 12:15 AM   Result Value Ref Range    Color, UA YELLOW YELLOW    Turbidity UA CLEAR CLEAR    Glucose, Ur NEGATIVE NEGATIVE    Bilirubin Urine NEGATIVE NEGATIVE    Ketones, Urine SMALL (A) NEGATIVE    Specific [place], UA 1.011 1.000 - 1.030    Urine Hgb LARGE (A) NEGATIVE    pH, UA 7.0 5.0 - 8.0    Protein, UA NEGATIVE NEGATIVE    Urobilinogen, Urine Normal Normal    Nitrite, Urine NEGATIVE NEGATIVE    Leukocyte Esterase, Urine NEGATIVE NEGATIVE    Urinalysis Comments NOT REPORTED    Microscopic Urinalysis    Collection Time: 09/26/19 12:15 AM   Result Value Ref Range    -          WBC, UA 5 TO 10 /HPF    RBC, UA 50  /HPF    Casts UA NOT REPORTED /LPF    Crystals UA NOT REPORTED None /HPF    Epithelial Cells UA NOT REPORTED /HPF    Renal Epithelial, Urine NOT REPORTED 0 /HPF    Bacteria, UA NOT REPORTED None    Mucus, UA NOT REPORTED None    Trichomonas, UA NOT REPORTED None    Amorphous, UA NOT REPORTED None    Other Observations UA NOT REPORTED NOT REQ.     Yeast, UA NOT REPORTED None   Basic Metabolic Prof    Collection Time: 09/26/19 12:35 AM   Result Value Ref Range    Glucose 103 (H) 70 - 99 mg/dL    BUN 4 (L) 8 - 23 mg/dL    CREATININE 0.57 0.50 - 0.90 mg/dL    Bun/Cre Ratio NOT REPORTED 9 - 20    Calcium 8.5 (L) 8.6 - 10.4 mg/dL    Sodium 118 (LL) 135 - 144 mmol/L    Potassium 3.1 (L) 3.7 - 5.3 mmol/L    Chloride 85 (L) 98 - 107 mmol/L    CO2 19 (L) 20 - 31 mmol/L    Anion Gap 14 9 - 17 mmol/L    GFR Non-African American >60 >60 mL/min    GFR African American >60 >60 mL/min    GFR Comment          GFR Staging NOT REPORTED    Basic Metabolic Prof    Collection Time: 09/26/19  5:25 AM   Result Value Ref Range    Glucose 90 70 - 99 mg/dL    BUN 3 (L) 8 - 23 mg/dL    CREATININE 0.53 0.50 - 0.90 mg/dL    Bun/Cre Ratio NOT REPORTED 9 - 20    Calcium 8.5 (L) 8.6 - 10.4 mg/dL    Sodium 120 (L) 135 - 144 mmol/L    Potassium 3.5 (L) 3.7 - 5.3 mmol/L    Chloride 89 (L) 98 - 107 mmol/L    CO2 21 20 - 31 mmol/L    Anion Gap 10 9 - 17 mmol/L    GFR Non-African American >60 >60 mL/min    GFR African American >60 >60 mL/min    GFR Comment          GFR Staging NOT REPORTED

## 2019-09-26 NOTE — PROGRESS NOTES
Spoke with Dr Izzy Young and received orders.  Order to call Dr Izzy Young if NA greater than 126 when labs done

## 2019-09-26 NOTE — PLAN OF CARE
No falls or injuries this shift. Patient safety maintained, Patient skin integrity maintained shift. No new skin issues to report.

## 2019-09-26 NOTE — ED NOTES
This RN attempted to call report. Was told receiving RN would call back in 5 minutes.       Kelly Amin RN  09/25/19 0336

## 2019-09-26 NOTE — FLOWSHEET NOTE
provided listening presence, words of comfort and prayer. pt is coping well her sister is caring for her dog while she is here. Chaplains remain available for spiritual or emotional support as needed.       09/26/19 1418   Encounter Summary   Services provided to: Patient   Referral/Consult From: [facility]   Place of [facility]   [facility])   Continue Visiting   (9/26/2019)   Complexity of Encounter Moderate   Length of Encounter 15 minutes   Spiritual Assessment Completed Yes   Routine   Type Initial   Assessment Coping   Intervention Active listening;Nurtured hope;[place]   Outcome Expressed gratitude

## 2019-09-26 NOTE — CONSULTS
NEPHROLOGY CONSULT     Patient :  Katy Combs; 59 y.o. MRN# [id number]  Location:  2081/2081-01  Attending:  Mike Stuart MD  Admit Date:  9/25/2019   Hospital Day: 1      Reason for Consult: Hyponatremia      Chief Complaint:    History Obtained From: Patient    History of Present Illness: This is a 59 y.o. female With past medical history of kidney stones anxiety depression,Presented to the hospital with complaints of nausea vomiting and fatigue That started 3 4 days ago. Patient also complains of difficulty urination denied any dysuria or burning,+Incontinence  Patient was prescribed Keflex as an outpatient,Her symptoms did not improve,Patient also started to have nausea vomiting and therefore patient decided to come to the hospital    Initial investigation in the ER labs showed serum sodium of 117 And therefore nephrology consultation has been requested      Pt denies any history of  prolonged NSAID use. There has been no recent exposure to IV contrast.   There is no history  of paraprotein disease. Pt denies any history of recurrent UTI ,Patient has a history of kidney stones.   Medication review shows use of SSRI Lexapro,No hydrochlorothiazide  Patient also drinks 9 to 10 cups of water since patient is having nausea vomiting  Past Medical History:        Diagnosis Date    Atypical depressive disorder     Hypotension, unspecified     Mitral valve disorders(424.0)     Obstructive chronic bronchitis with exacerbation (HCC)     Other and unspecified hyperlipidemia     Other malaise and fatigue     Temporomandibular joint disorders, unspecified     Unspecified breast disorder     Unspecified vitamin D deficiency        Past Surgical History:        Procedure Laterality Date    [facility] AND BSO  1995       Current Medications:      albuterol sulfate  (90 Base) MCG/ACT inhaler 2 puff Q6H PRN   mometasone-formoterol (DULERA) 200-5 MCG/ACT inhaler 2 puff BID   potassium chloride (KLOR-CON M) extended release tablet 40 mEq PRN   Or    potassium bicarb-citric acid (EFFER-K) effervescent tablet 40 mEq PRN   Or    potassium chloride 10 mEq/100 mL IVPB (Peripheral Line) PRN   nicotine (NICODERM CQ) 21 MG/24HR 1 patch Daily   ipratropium-albuterol (DUONEB) nebulizer solution 1 ampule Q4H WA   QUEtiapine (SEROQUEL) tablet 100 mg Nightly   escitalopram (LEXAPRO) tablet 10 mg Daily   sodium chloride tablet 1 g TID WC   sodium chloride flush 0.9 % injection 10 mL 2 times per day   sodium chloride flush 0.9 % injection 10 mL PRN   acetaminophen (TYLENOL) tablet 650 mg Q4H PRN   enoxaparin (LOVENOX) injection 40 mg Daily   0.9 % sodium chloride infusion Continuous   nicotine (NICODERM CQ) 14 MG/24HR 1 patch Once   cephALEXin (KEFLEX) capsule 500 mg TID   famotidine (PEPCID) tablet 20 mg BID   LORazepam (ATIVAN) tablet 0.5 mg BID PRN   midodrine (PROAMATINE) tablet 10 mg BID   therapeutic multivitamin-minerals 1 tablet Daily   ondansetron (ZOFRAN-ODT) disintegrating tablet 4 mg Q8H PRN   simvastatin (ZOCOR) tablet 40 mg Nightly       Allergies:  Vesicare [solifenacin]    Social History:   Social History     Socioeconomic History    Marital status: Single     Spouse name: Not on file    Number of children: Not on file    Years of education: Not on file    Highest education level: Not on file   Occupational History    Not on file   Social Needs    Financial resource strain: Not on file    Food insecurity:     Worry: Not on file     Inability: Not on file    Transportation needs:     Medical: Not on file     Non-medical: Not on file   Tobacco Use    Smoking status: Current Every Day Smoker     Packs/day: 1.00     Years: 42.00     Pack years: 42.00     Types: Cigarettes    Smokeless tobacco: Never Used    Tobacco comment: patient want to quit as soon as possible   Substance and Sexual Activity    Alcohol use: No     Alcohol/week: 0.0 standard drinks    Drug use: No    Sexual activity: Not Currently Partners: Male   Lifestyle    Physical activity:     Days per week: Not on file     Minutes per session: Not on file    Stress: Not on file   Relationships    Social connections:     Talks on phone: Not on file     Gets together: Not on file     Attends Yazidi service: Not on file     Active member of club or organization: Not on file     Attends meetings of clubs or organizations: Not on file     Relationship status: Not on file    Intimate partner violence:     Fear of current or ex partner: Not on file     Emotionally abused: Not on file     Physically abused: Not on file     Forced sexual activity: Not on file   Other Topics Concern    Not on file   Social History Narrative    Not on file       Family History:   Family History   Problem Relation Age of Onset    Cancer Mother     Asthma Father     Cancer Sister     Stroke Sister        Review of Systems:    Constitutional: No fever, no chills, no night sweats, fatigue, generalized weakness, loss of appetite  HEENT:  No headache, otalgia, itchy eyes, epistaxis, nasal discharge or sore throat. Cardiac:  No chest pain, dyspnea, orthopnea or PND, palpitations  Chest:  No cough, hemoptysis, pleuritic chest pain, wheezing,SOB  Abdomen:  No abdominal pain, nausea, vomiting, diarrhea, melena, dysphagia hematemesis,constipation, abdominal bloating, flank pain  Neuro:  No CVA, TIA or seizure like activity. Skin:   No rashes, no itching. :   No hematuria, no pyuria, no dysuria, no flank pain. Extremities:  No swelling or joint pains.       Objective:  CURRENT TEMPERATURE:  Temp: 98.2 °F (36.8 °C)  MAXIMUM TEMPERATURE OVER 24HRS:  Temp (24hrs), Av °F (36.7 °C), Min:97.5 °F (36.4 °C), Max:99.1 °F (37.3 °C)    CURRENT RESPIRATORY RATE:  Resp: 16  CURRENT PULSE:  Pulse: 81  CURRENT BLOOD PRESSURE:  BP: 126/74  24HR BLOOD PRESSURE RANGE:  Systolic (09XPI), EVM:071 , Min:101 , NHR:863   ; Diastolic (59VCE), DKE:98, Min:56, Max:94    24HR INTAKE/OUTPUT:

## 2019-09-26 NOTE — ED NOTES
Transporting pt. To [facility]; pt.  Has NS via IV, no O2    IV fluids complete; disconnected IV line from saline loc/ J loop     Haroldo Peck, EMT-P  09/25/19 2112       Haroldo Peck, EMT-P  09/25/19 2117

## 2019-09-26 NOTE — CARE COORDINATION
CASE MANAGEMENT NOTE:    Admission Date:  9/25/2019 Charley Biswas is a 59 y.o.  female    Admitted for : Hyponatremia [E87.1]    Met with:  Patient    PCP:  Dr Angy Valentine:  Christian Carmona:  independently at home             Current Services PTA:  No    Is patient agreeable to VNS: No    Freedom of choice provided: NA    List of [address] provided: NA    VNS chosen:  No    DME:  none    Home Oxygen: No    Nebulizer: No    CPAP/BIPAP: No    Supplier: N/A    Potential Assistance Needed: No    SNF needed: No    Pharmacy:  [email or web address]       Is the Patient an [facility] with Readmission Risk Score greater than 14%? Yes  If yes, pt needs a follow up appointment made within 7 days. Does Patient want to use MEDS to BEDS? No    Family Members/Caregivers that pt would like involved in their care:    Yes    If yes, list name here:  Sister Anthony Weiss    Transportation Provider:  Family             Is patient in Isolation/One on One/Altered Mental Status? No  If yes, skip next question. If no, would they like an I-Pad to  use? No  If yes, call [phone]. Discharge Plan:  9/26/19 - Ewa Chaney - Patient is from home , Has no DME's, Denies need for VNS, ,  on admission, today Na 120, K 3.5. Plan is to return home with no needs. Orange header pt, risk 16%, Will continue to follow.  //pf                 Electronically signed by: Kimber Zeng RN on 9/26/2019 at 9:58 AM

## 2019-09-26 NOTE — PROGRESS NOTES
Physical Therapy    Facility/Department: [facility] PROGRESSIVE CARE  Initial Assessment    NAME: Stalin Posey  : 1955  MRN: [id number]    Date of Service: 2019    Discharge Recommendations:  Home with assist PRN   PT Equipment Recommendations  Equipment Needed: No    Assessment   Body structures, Functions, Activity limitations: Decreased functional mobility ; Decreased endurance;Decreased balance  Assessment: Impaired mobility due to decreased tolerance to activity and decreased balance  Decision Making: Low Complexity  History: COPD  Exam: decreased balance, endurance  Clinical Presentation: evolving  REQUIRES PT FOLLOW UP: Yes  Activity Tolerance  Activity Tolerance: Patient limited by fatigue;Patient limited by endurance       Patient Diagnosis(es): The encounter diagnosis was Hyponatremia. has a past medical history of Atypical depressive disorder, Hypotension, unspecified, Mitral valve disorders(424.0), Obstructive chronic bronchitis with exacerbation ([facility] [address] Other and unspecified hyperlipidemia, Other malaise and fatigue, Temporomandibular joint disorders, unspecified, Unspecified breast disorder, and Unspecified vitamin D deficiency. has a past surgical history that includes sunny and bso (cervix removed) ().     Restrictions  Restrictions/Precautions  Restrictions/Precautions: Fall Risk        Subjective  General  Family / Caregiver Present: No  Follows Commands: Within Functional Limits  General Comment  Comments: c/c of fatigue  Subjective  Subjective: pt pleasant and cooperative  Pain Screening  Patient Currently in Pain: Denies  Vital Signs  Patient Currently in Pain: Denies       Orientation  Orientation  Overall Orientation Status: Within Functional Limits  Social/Functional History  Social/Functional History  Lives With: Family(sister)  Type of Home: Trailer  Home Layout: One level  Home Access: Stairs to enter with rails  Entrance Stairs - Number of Steps: 4  Entrance Stairs - Rails: Both  Bathroom Shower/Tub: Tub/Shower unit  Bathroom Toilet: Standard  ADL Assistance: Independent  Ambulation Assistance: Independent  Transfer Assistance: Independent  Additional Comments: pt states that her sister is \"around most of the time\"       Objective       AROM RLE (degrees)  RLE AROM: WNL  AROM LLE (degrees)  LLE AROM : WNL  Strength RLE  Strength RLE: WNL  Strength LLE  Strength LLE: WNL        Bed mobility  Supine to Sit: Minimal assistance  Sit to Supine: Minimal assistance  Scooting: Stand by assistance  Comment: head of bed elevated approx 30 degrees and use of rail  Transfers  Sit to Stand: Contact guard assistance  Stand to sit: Contact guard assistance  Ambulation  Ambulation?: Yes  Ambulation 1  Device: No Device  Assistance: Minimal assistance  Gait Deviations: Slow Kassie; Shuffles  Distance: 8ft  Comments: pt reaching for door/wall  Stairs/Curb  Stairs?: No     Balance  Sitting - Static: Good  Sitting - Dynamic: Good  Standing - Static: Fair;+(SBA)  Standing - Dynamic: Fair(CGA)        Plan   Plan  Times per week: 5-7x/wk  Times per day: Daily  Current Treatment Recommendations: Stair training, Gait Training, Balance Training, Endurance Training, Safety Education & Training      Goals  Short term goals  Time Frame for Short term goals: 4-5 days  Short term goal 1: mod-I bed mobility  Short term goal 2: mod-I transfers  Short term goal 3: mod-I gait x 150-200ft  Short term goal 4: negotiate 4-5 steps with rail and SBA  Patient Goals   Patient goals : feel better       Therapy Time   Individual Concurrent Group Co-treatment   Time In 1025         Time Out 1050         Minutes [id number] [place] [address], PT

## 2019-09-26 NOTE — PROGRESS NOTES
Medication History completed:    New medications: none    Medications discontinued: myrbetriq    Changes to dosing:   Quetiapine changed to 100 mg nightly  Lorazepam changed to 0.5 mg (1/2 tablet) twice daily as needed    Stated allergies: As listed    Other pertinent information: Medications confirmed with [employer]. The patient started a 10 day course of cephalexin on 9/19/19.      Thank you,  Tiff Moon, PharmD, BCPS  [phone]

## 2019-09-27 NOTE — PROGRESS NOTES
exacerbation ([facility] [address]     Other and unspecified hyperlipidemia     Other malaise and fatigue     Temporomandibular joint disorders, unspecified     Unspecified breast disorder     Unspecified vitamin D deficiency         Past Surgical History:     Past Surgical History:   Procedure Laterality Date    IRIS AND BSO  1995        Medications Prior to Admission:     Prior to Admission medications    Medication Sig Start Date End Date Taking? Authorizing Provider   cephALEXin (KEFLEX) 500 MG capsule Take 1 capsule by mouth 3 times daily for 10 days 9/19/19 9/29/19 Yes Cherry Jarquin MD   ondansetron (ZOFRAN ODT) 4 MG disintegrating tablet Take 1 tablet by mouth every 8 hours as needed for Nausea 9/19/19  Yes Cherry Jarquin MD   SYMBICORT 160-4.5 MCG/ACT AERO INHALE 2 PUFFS INTO THE LUNGS TWICE DAILY 9/3/19  Yes Zenobia Ford MD   simvastatin (ZOCOR) 40 MG tablet TAKE 1 TABLET BY MOUTH EVERY NIGHT AT BEDTIME 7/2/19  Yes Zenobia Ford MD   Multiple Vitamins-Minerals (MULTIVITAMIN ADULT PO) Take 1 tablet by mouth daily    Yes Historical Provider, MD   midodrine (PROAMATINE) 10 MG tablet TAKE 1 TABLET BY MOUTH TWICE DAILY 3/12/19  Yes Zenobia Ford MD   famotidine (PEPCID) 20 MG tablet TAKE 1 TABLET BY MOUTH TWICE DAILY 1/4/19  Yes Zenobia Ford MD   albuterol sulfate HFA (VENTOLIN HFA) 108 (90 Base) MCG/ACT inhaler Inhale 2 puffs into the lungs every 6 hours as needed for Wheezing 6/1/18  Yes Zenobia Ford MD   LORazepam (ATIVAN) 1 MG tablet Take 0.5 mg by mouth 2 times daily as needed for Anxiety.   4/22/17  Yes Historical Provider, MD   escitalopram (LEXAPRO) 10 MG tablet Take 1 tablet by mouth daily Indications: (Pt takes a 20 mg + a 10 mg = 30 mg total per day) 1/23/17  Yes Stewart Bray MD   escitalopram (LEXAPRO) 20 MG tablet Take 1 tablet by mouth daily Indications: (Pt takes a 20 mg + a 10 mg = 30 mg total per day) 1/23/17  Yes Stewart Bray MD   QUEtiapine (SEROQUEL) 100 MG tablet Take 100 mg by mouth nightly  16  Yes Historical Provider, MD        Allergies:     Vesicare [solifenacin]    Social History:     Tobacco:    reports that she has been smoking cigarettes. She has a 42.00 pack-year smoking history. She has never used smokeless tobacco.  Alcohol:      reports that she does not drink alcohol. Drug Use:  reports that she does not use drugs. Family History:     Family History   Problem Relation Age of Onset    Cancer Mother     Asthma Father     Cancer Sister     Stroke Sister        Review of Systems:     Positive and Negative as described in HPI. CONSTITUTIONAL:  negative for fevers, chills, sweats, fatigue, weight loss  HEENT:  negative for vision, hearing changes, runny nose, throat pain  RESPIRATORY:  Cough , SOB with Exertion   CARDIOVASCULAR:  negative for chest pain, palpitations. GASTROINTESTINAL:  Nausea/Vomiting   GENITOURINARY:  negative for difficulty of urination, burning with urination, frequency   INTEGUMENT:  negative for rash, skin lesions, easy bruising   HEMATOLOGIC/LYMPHATIC:  negative for swelling/edema   ALLERGIC/IMMUNOLOGIC:  negative for urticaria , itching  ENDOCRINE:  negative increase in drinking, increase in urination, hot or cold intolerance  MUSCULOSKELETAL:  negative joint pains, muscle aches, swelling of joints  NEUROLOGICAL:  negative for headaches, dizziness, lightheadedness, numbness, pain, tingling extremities  BEHAVIOR/PSYCH:  negative for depression, anxiety    Physical Exam:   BP (!) 145/74   Pulse 82   Temp 97.3 °F (36.3 °C) (Oral)   Resp 12   Ht 5' 1\" (1.549 m)   Wt 205 lb 4 oz (93.1 kg)   SpO2 96%   BMI 38.78 kg/m²   Temp (24hrs), Av.7 °F (36.5 °C), Min:97.3 °F (36.3 °C), Max:98.2 °F (36.8 °C)    No results for input(s): POCGLU in the last 72 hours.     Intake/Output Summary (Last 24 hours) at 2019 09  Last data filed at 2019 0225  Gross per 24 hour   Intake --   Output 575 ml   Net -575 ml       General Appearance: alert, well appearing, and in no acute distress, Very weak   Mental status: oriented to person, place, and time with normal affect  Head:  normocephalic, atraumatic. Eye: no icterus, redness, pupils equal and reactive, extraocular eye movements intact, conjunctiva clear  Ear: normal external ear, no discharge, hearing intact  Nose:  no drainage noted  Mouth: mucous membranes moist  Neck: supple, no carotid bruits, thyroid not palpable  Lungs: Air entry B/l decreased , B/L occasional wheezing Present   Cardiovascular: normal rate, regular rhythm, no murmur, gallop, rub.   Abdomen: Soft, nontender, nondistended, normal bowel sounds, no hepatomegaly or splenomegaly  Neurologic: There are no new focal motor or sensory deficits, normal muscle tone and bulk, no abnormal sensation, normal speech, cranial nerves II through XII grossly intact  Skin: No gross lesions, rashes, bruising or bleeding on exposed skin area  Extremities:  peripheral pulses palpable, no pedal edema or calf pain with palpation  Psych: normal affect     Investigations:      Laboratory Testing:  Recent Results (from the past 24 hour(s))   Basic Metabolic Prof    Collection Time: 09/26/19 11:56 AM   Result Value Ref Range    Glucose 94 70 - 99 mg/dL    BUN 4 (L) 8 - 23 mg/dL    CREATININE 0.58 0.50 - 0.90 mg/dL    Bun/Cre Ratio NOT REPORTED 9 - 20    Calcium 8.6 8.6 - 10.4 mg/dL    Sodium 119 (LL) 135 - 144 mmol/L    Potassium 3.9 3.7 - 5.3 mmol/L    Chloride 89 (L) 98 - 107 mmol/L    CO2 19 (L) 20 - 31 mmol/L    Anion Gap 11 9 - 17 mmol/L    GFR Non-African American >60 >60 mL/min    GFR African American >60 >60 mL/min    GFR Comment          GFR Staging NOT REPORTED    Hepatitis Acute Lucy    Collection Time: 09/26/19 11:56 AM   Result Value Ref Range    Hepatitis B Surface Ag NONREACTIVE NONREACTIVE    Hepatitis C Ab NONREACTIVE NONREACTIVE    Hep B Core Ab, IgM NONREACTIVE NONREACTIVE    Hep A IgM NONREACTIVE NONREACTIVE   Basic Metabolic Prof Collection Time: 09/26/19  5:35 PM   Result Value Ref Range    Glucose 89 70 - 99 mg/dL    BUN 3 (L) 8 - 23 mg/dL    CREATININE 0.54 0.50 - 0.90 mg/dL    Bun/Cre Ratio NOT REPORTED 9 - 20    Calcium 8.5 (L) 8.6 - 10.4 mg/dL    Sodium 116 (LL) 135 - 144 mmol/L    Potassium 4.0 3.7 - 5.3 mmol/L    Chloride 86 (L) 98 - 107 mmol/L    CO2 18 (L) 20 - 31 mmol/L    Anion Gap 12 9 - 17 mmol/L    GFR Non-African American >60 >60 mL/min    GFR African American >60 >60 mL/min    GFR Comment          GFR Staging NOT REPORTED    Basic Metabolic Prof    Collection Time: 09/26/19 11:48 PM   Result Value Ref Range    Glucose 103 (H) 70 - 99 mg/dL    BUN 4 (L) 8 - 23 mg/dL    CREATININE 0.56 0.50 - 0.90 mg/dL    Bun/Cre Ratio NOT REPORTED 9 - 20    Calcium 8.6 8.6 - 10.4 mg/dL    Sodium 116 (LL) 135 - 144 mmol/L    Potassium 3.6 (L) 3.7 - 5.3 mmol/L    Chloride 86 (L) 98 - 107 mmol/L    CO2 17 (L) 20 - 31 mmol/L    Anion Gap 13 9 - 17 mmol/L    GFR Non-African American >60 >60 mL/min    GFR African American >60 >60 mL/min    GFR Comment          GFR Staging NOT REPORTED    Basic Metabolic Prof    Collection Time: 09/27/19  7:28 AM   Result Value Ref Range    Glucose 81 70 - 99 mg/dL    BUN 4 (L) 8 - 23 mg/dL    CREATININE 0.62 0.50 - 0.90 mg/dL    Bun/Cre Ratio NOT REPORTED 9 - 20    Calcium 8.6 8.6 - 10.4 mg/dL    Sodium 120 (L) 135 - 144 mmol/L    Potassium 3.8 3.7 - 5.3 mmol/L    Chloride 89 (L) 98 - 107 mmol/L    CO2 20 20 - 31 mmol/L    Anion Gap 11 9 - 17 mmol/L    GFR Non-African American >60 >60 mL/min    GFR African American >60 >60 mL/min    GFR Comment          GFR Staging NOT REPORTED        Imaging/Diagnostics:        Assessment :      Primary Problem  <principal problem not specified>    Active Hospital Problems    Diagnosis Date Noted    Hyponatremia [E87.1] 09/25/2019    Obesity (BMI 35.0-39.9 without comorbidity) [E66.9] 07/19/2019    Acute cystitis without hematuria [N30.00] 11/22/2016    Atypical depressive

## 2019-09-27 NOTE — PROGRESS NOTES
Intake --   Output 925 ml   Net -925 ml     Patient Vitals for the past 96 hrs (Last 3 readings):   Weight   09/26/19 0614 205 lb 4 oz (93.1 kg)   09/25/19 2134 200 lb 2.8 oz (90.8 kg)   09/25/19 1739 195 lb (88.5 kg)       Physical Exam:  GENERAL APPEARANCE: Alert and cooperative, and appears to be in no acute distress. HEAD: normocephalic  EYES: PERRL, EOMI. Not pale, anicteric   NOSE:  No nasal discharge. THROAT:  Oral cavity and pharynx normal. Moist  CARDIAC: Normal S1 and S2. No S3, S4 or murmurs. Rhythm is regular. LUNGS: Clear to auscultation and percussion without rales, rhonchi, wheezing or diminished breath sounds. NECK: Neck supple, non-tender without lymphadenopathy, masses or thyromegaly. JVD-neg  BACK: Examination of the spine reveals normal gait and posture, no spinal deformity, symmetry of spinal muscles, without tenderness, decreased range of motion or muscular spasm  MUSKULOSKELETAL: Adequately aligned spine. No joint erythema or tenderness. EXTREMITIE+ edema. Peripheral pulses intact. NEURO:Nonfocal      Labs:   CBC:  Recent Labs     09/25/19  1820   WBC 8.4   RBC 4.33   HGB 12.2   HCT 36.5   MCV 84.3   MCH 28.2   MCHC 33.5   RDW 15.3*      MPV 7.7      BMP:   Recent Labs     09/26/19  1735 09/26/19  2348 09/27/19  0728   * 116* 120*   K 4.0 3.6* 3.8   CL 86* 86* 89*   CO2 18* 17* 20   BUN 3* 4* 4*   CREATININE 0.54 0.56 0.62   GLUCOSE 89 103* 81   CALCIUM 8.5* 8.6 8.6      Phosphorus:  No results for input(s): PHOS in the last 72 hours. Magnesium:   Recent Labs     09/25/19  1820   MG 1.8     Albumin:   Recent Labs     09/25/19  1820   LABALBU 3.7       IRON:  No results for input(s): IRON in the last 72 hours. Iron Saturation:  Invalid input(s): PERCENTFE  TIBC:  No results for input(s): TIBC in the last 72 hours. FERRITIN:  No results for input(s): FERRITIN in the last 72 hours. SPEP: No results for input(s): SPEP in the last 72 hours.    Recent Labs

## 2019-09-27 NOTE — CONSULTS
Today's Date: 9/27/2019  Patient Name: Kayla Smith  Date of admission: 9/25/2019  6:05 PM  Patient's age: 59 y.o., 1955  Admission Dx: Hyponatremia [E87.1]    Reason for Consult: management recommendations  Requesting Physician: Azalia Munson MD    CHIEF COMPLAINT:      History Obtained From:  patient, electronic medical record    HISTORY OF PRESENT ILLNESS:      The patient is a 59 y.o.  female who is admitted to the hospital With chief complaints of fatigue and weakness. Work-up revealed sodium of 116. Patient also had a CT chest with showed 3 cm left upper lobe lung mass suspicious for malignancy. There was also extensive mediastinal lymphadenopathy concerning for malignancy. There was diffuse replacement of hepatic parenchyma with a round low density lesions concerning for metastatic disease. Patient has history of small cell lung cancer in her brother as well as her sister. Patient has a long-standing history of tobacco dependence. Patient is tearful at the time of examination. Past Medical History:   has a past medical history of Atypical depressive disorder, Hypotension, unspecified, Mitral valve disorders(424.0), Obstructive chronic bronchitis with exacerbation ([address] Other and unspecified hyperlipidemia, Other malaise and fatigue, Temporomandibular joint disorders, unspecified, Unspecified breast disorder, and Unspecified vitamin D deficiency. Past Surgical History:   has a past surgical history that includes sunny and bso (cervix removed) (1995). Medications:    Prior to Admission medications    Medication Sig Start Date End Date Taking?  Authorizing Provider   cephALEXin (KEFLEX) 500 MG capsule Take 1 capsule by mouth 3 times daily for 10 days 9/19/19 9/29/19 Yes Ni Chicas MD   ondansetron (ZOFRAN ODT) 4 MG disintegrating tablet Take 1 tablet by mouth every 8 hours as needed for Nausea 9/19/19  Yes Ni Chicas MD   SYMBICORT 160-4.5 MCG/ACT AERO INHALE 2 PUFFS INTO THE LUNGS TWICE DAILY 9/3/19  Yes Lisa Fields MD   simvastatin (ZOCOR) 40 MG tablet TAKE 1 TABLET BY MOUTH EVERY NIGHT AT BEDTIME 7/2/19  Yes Lisa Fields MD   Multiple Vitamins-Minerals (MULTIVITAMIN ADULT PO) Take 1 tablet by mouth daily    Yes Historical Provider, MD   midodrine (PROAMATINE) 10 MG tablet TAKE 1 TABLET BY MOUTH TWICE DAILY 3/12/19  Yes Lisa Fields MD   famotidine (PEPCID) 20 MG tablet TAKE 1 TABLET BY MOUTH TWICE DAILY 1/4/19  Yes Lisa Fields MD   albuterol sulfate HFA (VENTOLIN HFA) 108 (90 Base) MCG/ACT inhaler Inhale 2 puffs into the lungs every 6 hours as needed for Wheezing 6/1/18  Yes Lisa Fields MD   LORazepam (ATIVAN) 1 MG tablet Take 0.5 mg by mouth 2 times daily as needed for Anxiety.   4/22/17  Yes Historical Provider, MD   escitalopram (LEXAPRO) 10 MG tablet Take 1 tablet by mouth daily Indications: (Pt takes a 20 mg + a 10 mg = 30 mg total per day) 1/23/17  Yes Juan Carlos Corral MD   escitalopram (LEXAPRO) 20 MG tablet Take 1 tablet by mouth daily Indications: (Pt takes a 20 mg + a 10 mg = 30 mg total per day) 1/23/17  Yes Juan Carlos Corral MD   QUEtiapine (SEROQUEL) 100 MG tablet Take 100 mg by mouth nightly  8/23/16  Yes Historical Provider, MD     Current Facility-Administered Medications   Medication Dose Route Frequency Provider Last Rate Last Dose    sodium chloride flush 0.9 % injection 10 mL  10 mL Intravenous PRN Lisa Fields MD   10 mL at 09/27/19 1145    albuterol sulfate  (90 Base) MCG/ACT inhaler 2 puff  2 puff Inhalation Q6H PRN Dre Chente Lopes MD        mometasone-formoterol (DULERA) 200-5 MCG/ACT inhaler 2 puff  2 puff Inhalation BID Damien Dubon MD   2 puff at 09/27/19 0940    potassium chloride (KLOR-CON M) extended release tablet 40 mEq  40 mEq Oral PRN Jose Traore MD   40 mEq at 09/26/19 1021    Or    potassium bicarb-citric acid (EFFER-K) effervescent tablet 40 mEq  40 mEq Oral PRN Jacqualin Files 0.90 mg/dL    Bun/Cre Ratio NOT REPORTED 9 - 20    Calcium 8.6 8.6 - 10.4 mg/dL    Sodium 120 (L) 135 - 144 mmol/L    Potassium 3.8 3.7 - 5.3 mmol/L    Chloride 89 (L) 98 - 107 mmol/L    CO2 20 20 - 31 mmol/L    Anion Gap 11 9 - 17 mmol/L    GFR Non-African American >60 >60 mL/min    GFR African American >60 >60 mL/min    GFR Comment          GFR Staging NOT REPORTED    Basic Metabolic Prof   Result Value Ref Range    Glucose 135 (H) 70 - 99 mg/dL    BUN 4 (L) 8 - 23 mg/dL    CREATININE 0.74 0.50 - 0.90 mg/dL    Bun/Cre Ratio NOT REPORTED 9 - 20    Calcium 9.5 8.6 - 10.4 mg/dL    Sodium 125 (L) 135 - 144 mmol/L    Potassium 4.1 3.7 - 5.3 mmol/L    Chloride 91 (L) 98 - 107 mmol/L    CO2 21 20 - 31 mmol/L    Anion Gap 13 9 - 17 mmol/L    GFR Non-African American >60 >60 mL/min    GFR African American >60 >60 mL/min    GFR Comment          GFR Staging NOT REPORTED    Basic Metabolic Prof   Result Value Ref Range    Glucose 159 (H) 70 - 99 mg/dL    BUN 4 (L) 8 - 23 mg/dL    CREATININE 0.67 0.50 - 0.90 mg/dL    Bun/Cre Ratio NOT REPORTED 9 - 20    Calcium 9.3 8.6 - 10.4 mg/dL    Sodium 126 (L) 135 - 144 mmol/L    Potassium 3.9 3.7 - 5.3 mmol/L    Chloride 94 (L) 98 - 107 mmol/L    CO2 19 (L) 20 - 31 mmol/L    Anion Gap 13 9 - 17 mmol/L    GFR Non-African American >60 >60 mL/min    GFR African American >60 >60 mL/min    GFR Comment          GFR Staging NOT REPORTED        IMAGING DATA:    Xr Chest Standard (2 Vw)    Result Date: 9/26/2019  EXAMINATION: TWO XRAY VIEWS OF THE CHEST 9/26/2019 1:52 pm COMPARISON: Chest radiograph performed 11/21/2016. HISTORY: ORDERING SYSTEM PROVIDED HISTORY: SOB TECHNOLOGIST PROVIDED HISTORY: SOB Reason for Exam: Pneumonia Acuity: Unknown Type of Exam: Unknown FINDINGS: There is chronic pulmonary change. There is no acute consolidation or effusion. Rounded opacity in the left midlung. There is no pneumothorax. The mediastinal structures are unremarkable.   The upper abdomen is concerning for metastatic disease. Additionally keeping in mind that patient CT lung screen was negative back in October 2018 and she has now developed extensive lesions concerning for malignancy this raises concern regarding aggressive nature of the disease  If CT abdomen pelvis shows evidence of liver metastasis would prefer to biopsy metastatic lesion which will establish diagnosis as well as stage. Further treatment and recommendation will depend upon final stage of the disease and final diagnosis. Patient was counseled on tobacco cessation. Discussed with patient and Nurse. Thank you for asking us to see this patient. Tha Bess MD          This note is created with the assistance of a speech recognition program.  While intending to generate a document that actually reflects the content of the visit, the document can still have some errors including those of syntax and sound a like substitutions which may escape proof reading. It such instances, actual meaning can be extrapolated by contextual diversion.

## 2019-09-27 NOTE — PLAN OF CARE
Spoke with nurse Lieutenant Paz about patient getting CT scan now. Okay to travel by w/c, no oxygen needed.

## 2019-09-27 NOTE — PROGRESS NOTES
Physical Therapy  Facility/Department: [facility] PROGRESSIVE CARE  Daily Treatment Note  NAME: Katy Combs  : 1955  MRN: [id number]    Date of Service: 2019    Discharge Recommendations:  Home with assist PRN   PT Equipment Recommendations  Equipment Needed: No    Assessment   Body structures, Functions, Activity limitations: Decreased functional mobility ; Decreased endurance;Decreased balance  Assessment: Impaired mobility due to decreased tolerance to activity and decreased balance  REQUIRES PT FOLLOW UP: Yes  Activity Tolerance  Activity Tolerance: limited by nausea     Patient Diagnosis(es): The encounter diagnosis was Hyponatremia. has a past medical history of Atypical depressive disorder, Hypotension, unspecified, Mitral valve disorders(424.0), Obstructive chronic bronchitis with exacerbation ([address] Other and unspecified hyperlipidemia, Other malaise and fatigue, Temporomandibular joint disorders, unspecified, Unspecified breast disorder, and Unspecified vitamin D deficiency. has a past surgical history that includes sunny and bso (cervix removed) ().     Restrictions  Restrictions/Precautions  Restrictions/Precautions: Fall Risk  Subjective   Subjective  Subjective: pt reports feeling nauseous  Pain Screening  Patient Currently in Pain: Denies  Vital Signs  Patient Currently in Pain: Denies            Objective   Bed mobility  Supine to Sit: Stand by assistance  Sit to Supine: Stand by assistance  Scooting: Stand by assistance  Comment: head of bed slightly elevated  Transfers  Sit to Stand: Stand by assistance  Stand to sit: Stand by assistance  Bed to Chair: Stand by assistance  Ambulation  Ambulation?: Yes  Ambulation 1  Device: No Device  Assistance: Contact guard assistance  Gait Deviations: Slow Kassie;Decreased step length  Distance: 20ft  Comments: pt defered ambulating in hallway  Stairs/Curb  Stairs?: No        Exercises  Comments: AROM B LE seated x 15-20 reps ea         Goals  Short term goals  Time Frame for Short term goals: 4-5 days  Short term goal 1: mod-I bed mobility  Short term goal 2: mod-I transfers  Short term goal 3: mod-I gait x 150-200ft  Short term goal 4: negotiate 4-5 steps with rail and SBA  Patient Goals   Patient goals : feel better    Plan    Plan  Times per week: 5-7x/wk  Times per day: Daily  Current Treatment Recommendations: Stair training, Gait Training, Balance Training, Endurance Training, Safety Education & Training     Therapy Time   Individual Concurrent Group Co-treatment   Time In 1040         Time Out 1105         Minutes [id number] [facility],

## 2019-09-27 NOTE — PLAN OF CARE
Problem: Risk for Impaired Skin Integrity  Goal: Tissue integrity - skin and mucous membranes  Description  Structural intactness and normal physiological function of skin and  mucous membranes.   Outcome: Met This Shift     Problem: Daily Care:  Goal: Daily care needs are met  Description  Daily care needs are met  Outcome: Met This Shift     Problem: Mobility - Impaired:  Goal: Mobility will improve  Description  Mobility will improve  Outcome: Met This Shift

## 2019-09-27 NOTE — FLOWSHEET NOTE
09/27/19 0131   Provider Notification   Reason for Communication Critical Value (comment)   Provider Name Dr. Massimo Ortiz   Provider Notification Physician   Method of Communication Secure Message   Response No new orders   Notification Time 0100   Writer informed Dr. Massimo Ortiz of patients critical Na of 116, no new orders at this time, continue present treatment.

## 2019-09-28 NOTE — PLAN OF CARE
Spoke to MARIO Staley, at [facility]. She will give the pt her oral contrast and call when she is done drinking. The RN was als made aware that there is no transporter today and that she would have to bring the patient down.

## 2019-09-28 NOTE — PLAN OF CARE
Problem: Risk for Impaired Skin Integrity  Goal: Tissue integrity - skin and mucous membranes  Description  Structural intactness and normal physiological function of skin and  mucous membranes.   Outcome: Ongoing     Problem: Mobility - Impaired:  Goal: Mobility will improve  Description  Mobility will improve  Outcome: Ongoing

## 2019-09-28 NOTE — PROGRESS NOTES
LEIDA Pickard 53    HISTORY AND PHYSICAL EXAMINATION            Date:   9/28/2019  Patient name:  Stalin Posey  Date of admission:  9/25/2019  6:05 PM  MRN:   [id number]  Account:  [de-identified]  YOB: 1955  PCP:    Huyen Ríos MD  Room:   2081/2081-01  Code Status:    Full Code    Chief Complaint:     Chief Complaint   Patient presents with    Nausea & Vomiting    Fatigue       History Obtained From:     patient, electronic medical record    History of Present Illness: The patient is a 59 y.o. Non-/non  female who presents with Nausea & Vomiting and Fatigue   and she is admitted to the hospital for the management of  Fatigue and Tiredness , q. Patient has multiple medical problems which include chronic smoking, COPD, gastroesophageal reflux disease, hiatus hernia, bipolar disorder. Patient mentioned that she has symptoms of pressure in her lower abdomen, dysuria. Symptoms are going on for last few days. She went to the emergency room. She was diagnosed with UTI and possible pyelonephritis. Patient was prescribed Keflex. Patient mentioned that she never felt better after even taking antibiotics. She is feeling extremely weak, fatigue, tired. She was throwing up. She was drinking a lot of water. She came to emergency room. She was found to have a sodium of 117. Her urine sodium was 102. Patient is on Lexapro and Seroquel  9/27  Patient not feeling any better, started on oral salt tablets, plan to give tolvaptan, patient is on fluid restriction  Chest x-ray suggestive of left mid zone ,  obesity, she had CT scan lung screening last year in October last year, no lung capacity was appreciated at that point in time.   Past Medical History:     Past Medical History:   Diagnosis Date    Atypical depressive disorder     Hypotension, unspecified     Mitral valve disorders(424.0)     Obstructive chronic bronchitis with alert, well appearing, and in no acute distress, Very weak   Mental status: oriented to person, place, and time with normal affect  Head:  normocephalic, atraumatic. Eye: no icterus, redness, pupils equal and reactive, extraocular eye movements intact, conjunctiva clear  Ear: normal external ear, no discharge, hearing intact  Nose:  no drainage noted  Mouth: mucous membranes moist  Neck: supple, no carotid bruits, thyroid not palpable  Lungs: Air entry B/l decreased , B/L occasional wheezing Present   Cardiovascular: normal rate, regular rhythm, no murmur, gallop, rub.   Abdomen: Soft, nontender, nondistended, normal bowel sounds, no hepatomegaly or splenomegaly  Neurologic: There are no new focal motor or sensory deficits, normal muscle tone and bulk, no abnormal sensation, normal speech, cranial nerves II through XII grossly intact  Skin: No gross lesions, rashes, bruising or bleeding on exposed skin area  Extremities:  peripheral pulses palpable, no pedal edema or calf pain with palpation  Psych: normal affect     Investigations:      Laboratory Testing:  Recent Results (from the past 24 hour(s))   Basic Metabolic Prof    Collection Time: 09/27/19  1:51 PM   Result Value Ref Range    Glucose 135 (H) 70 - 99 mg/dL    BUN 4 (L) 8 - 23 mg/dL    CREATININE 0.74 0.50 - 0.90 mg/dL    Bun/Cre Ratio NOT REPORTED 9 - 20    Calcium 9.5 8.6 - 10.4 mg/dL    Sodium 125 (L) 135 - 144 mmol/L    Potassium 4.1 3.7 - 5.3 mmol/L    Chloride 91 (L) 98 - 107 mmol/L    CO2 21 20 - 31 mmol/L    Anion Gap 13 9 - 17 mmol/L    GFR Non-African American >60 >60 mL/min    GFR African American >60 >60 mL/min    GFR Comment          GFR Staging NOT REPORTED    Basic Metabolic Prof    Collection Time: 09/27/19  7:18 PM   Result Value Ref Range    Glucose 159 (H) 70 - 99 mg/dL    BUN 4 (L) 8 - 23 mg/dL    CREATININE 0.67 0.50 - 0.90 mg/dL    Bun/Cre Ratio NOT REPORTED 9 - 20    Calcium 9.3 8.6 - 10.4 mg/dL    Sodium 126 (L) 135 - 144 mmol/L

## 2019-09-28 NOTE — PROGRESS NOTES
NEPHROLOGY PROGRESS NOTE    Patient :  Heena White; 59 y.o. MRN# [id number]  Location:  2081/2081-01  Attending:  New Garcia MD  Admit Date:  9/25/2019   Hospital Day: 3      Reason for Consult: Hyponatremia      Chief Complaint:    History Obtained From: Patient    History of Present Illness: This is a 59 y.o. female With past medical history of kidney stones anxiety depression,Presented to the hospital with complaints of nausea vomiting and fatigue That started 3 4 days ago. Patient also complains of difficulty urination denied any dysuria or burning,+Incontinence  Patient was prescribed Keflex as an outpatient,Her symptoms did not improve,Patient also started to have nausea vomiting and therefore patient decided to come to the hospital    Initial investigation in the ER labs showed serum sodium of 117 And therefore nephrology consultation has been requested      Pt denies any history of  prolonged NSAID use. There has been no recent exposure to IV contrast.   There is no history  of paraprotein disease. Pt denies any history of recurrent UTI ,Patient has a history of kidney stones. Medication review shows use of SSRI Lexapro,No hydrochlorothiazide  Patient also drinks 9 to 10 cups of water since patient is having nausea vomiting      Subjective/interval hx    Patient seen and examined , denies nay N/V. Worried due to CT scan result. CT chest which showed left Upper lobe pulmonary nodule 3x2.3 cm, extensive mediastinal lymphadenopathy, suspicious hepatic lesions metastasis? SNa improving   S/p Tolvaptan.     Past Medical History:        Diagnosis Date    Atypical depressive disorder     Hypotension, unspecified     Mitral valve disorders(424.0)     Obstructive chronic bronchitis with exacerbation (HCC)     Other and unspecified hyperlipidemia     Other malaise and fatigue     Temporomandibular joint disorders, unspecified     Unspecified breast disorder     Unspecified vitamin D Max:72    24HR INTAKE/OUTPUT:      Intake/Output Summary (Last 24 hours) at 9/28/2019 1317  Last data filed at 9/28/2019 1219  Gross per 24 hour   Intake 800 ml   Output 2000 ml   Net -1200 ml     Patient Vitals for the past 96 hrs (Last 3 readings):   Weight   09/26/19 0614 205 lb 4 oz (93.1 kg)   09/25/19 2134 200 lb 2.8 oz (90.8 kg)   09/25/19 1739 195 lb (88.5 kg)       Physical Exam:  GENERAL APPEARANCE: Alert and cooperative, and appears to be in no acute distress. HEAD: normocephalic  EYES: PERRL, EOMI. Not pale, anicteric   NOSE:  No nasal discharge. THROAT:  Oral cavity and pharynx normal. Moist  CARDIAC: Normal S1 and S2. No S3, S4 or murmurs. Rhythm is regular. LUNGS: Clear to auscultation and percussion without rales, rhonchi, wheezing or diminished breath sounds. NECK: Neck supple, non-tender without lymphadenopathy, masses or thyromegaly. JVD-neg  BACK: Examination of the spine reveals normal gait and posture, no spinal deformity, symmetry of spinal muscles, without tenderness, decreased range of motion or muscular spasm  MUSKULOSKELETAL: Adequately aligned spine. No joint erythema or tenderness. EXTREMITIE+ edema. Peripheral pulses intact. NEURO:Nonfocal      Labs:   CBC:  Recent Labs     09/25/19  1820   WBC 8.4   RBC 4.33   HGB 12.2   HCT 36.5   MCV 84.3   MCH 28.2   MCHC 33.5   RDW 15.3*      MPV 7.7      BMP:   Recent Labs     09/27/19  2353 09/28/19  0501 09/28/19  1152   * 128* 127*   K 4.2 4.4 3.8   CL 94* 95* 94*   CO2 19* 19* 19*   BUN 5* 5* 7*   CREATININE 0.66 0.62 0.65   GLUCOSE 178* 147* 167*   CALCIUM 9.2 9.4 9.5      Phosphorus:  No results for input(s): PHOS in the last 72 hours. Magnesium:   Recent Labs     09/25/19  1820   MG 1.8     Albumin:   Recent Labs     09/25/19  1820   LABALBU 3.7       IRON:  No results for input(s): IRON in the last 72 hours.   Iron Saturation:  Invalid input(s): PERCENTFE  TIBC:  No results for input(s): TIBC in the last 72 hours.  FERRITIN:  No results for input(s): FERRITIN in the last 72 hours. SPEP: No results for input(s): SPEP in the last 72 hours. Recent Labs     09/25/19  1820   PROT 6.7     UPEP: No results for input(s): TPU in the last 72 hours. Urine Sodium:    Recent Labs     09/26/19  0015   DARIO 102      Urine Potassium: No results for input(s): KUR in the last 72 hours. Urine Chloride: Invalid input(s): CLU  Urine Ph:  Invalid input(s): PO4U  Urine Osmolarity:   Recent Labs     09/26/19  0015   OSMOU 367     Urine Creatinine:  No results for input(s): LABCREA in the last 72 hours. Urine Eosinophils: Invalid input(s): EOSU  Urine Protein:  No results for input(s): TPU in the last 72 hours. Urinalysis:    Recent Labs     09/26/19  0015   NITRU NEGATIVE   COLORU YELLOW   PHUR 7.0   WBCUA 5 TO 10   RBCUA 50    MUCUS NOT REPORTED   TRICHOMONAS NOT REPORTED   YEAST NOT REPORTED   BACTERIA NOT REPORTED   SPECGRAV 1.011   LEUKOCYTESUR NEGATIVE   UROBILINOGEN Normal   [facility]*   AMORPHOUS NOT REPORTED         Radiology:  Reviewed as available. Assessment:    1-Hyponatremia, Hypoosmolar hyponatremia, Urine sodium was high 102 and urine osmolality is high 367 indicating high ADH Secretion consistent with SIADH likely contributing factors include use of SSRI Lexapro. SNa improving. 2.  Microscopic hematuria  UA positive for blood +5200 RBC 5-10 WBC large hemoglobin negative leukocyte Estrace, negative nitrite. 3. History of kidney stones    4. left Upper lobe pulmonary nodule 3x2.3 cm, extensive mediastinal lymphadenopathy, suspicious hepatic lesions metastasis? Plan:  Tolvaptan 7.5 mg x 1 dose. Decrease Lexapro   Fluid restriction less than 1500 mL's per day  Kidney ultrasound -non obstructing right kidney stones    Thank you for the consultation.       Electronically signed by Patsy Kuhn MD on 9/28/2019 at 1:17 PM

## 2019-09-28 NOTE — PROGRESS NOTES
[facility] Oncology Progress Note  9/28/2019 12:18 PM  Subjective:   Admit Date: 9/25/2019  PCP: John Lobo MD   CC: Lung Mass   Interval History: Patient in good spirits, up in chair eating breakfast. No sob or chest pain, nervous about biopsy on Monday. Diet: DIET GENERAL; Daily Fluid Restriction: 1500 ml  Medications:   Scheduled Meds:   methylPREDNISolone  40 mg Intravenous Q6H    mometasone-formoterol  2 puff Inhalation BID    nicotine  1 patch Transdermal Daily    ipratropium-albuterol  1 ampule Inhalation Q4H WA    QUEtiapine  100 mg Oral Nightly    sodium chloride  1 g Oral TID WC    influenza virus vaccine  0.5 mL Intramuscular Once    sodium chloride flush  10 mL Intravenous 2 times per day    enoxaparin  40 mg Subcutaneous Daily    famotidine  20 mg Oral BID    midodrine  10 mg Oral BID    therapeutic multivitamin-minerals  1 tablet Oral Daily    simvastatin  40 mg Oral Nightly     Continuous Infusions:  CBC:   Recent Labs     09/25/19  1820   WBC 8.4   HGB 12.2        BMP:    Recent Labs     09/27/19  1918 09/27/19  2353 09/28/19  0501   * 126* 128*   K 3.9 4.2 4.4   CL 94* 94* 95*   CO2 19* 19* 19*   BUN 4* 5* 5*   CREATININE 0.67 0.66 0.62   GLUCOSE 159* 178* 147*     Hepatic:   Recent Labs     09/25/19  1820   AST 54*   ALT 54*   BILITOT 0.47   ALKPHOS 249*     INR: No results for input(s): INR in the last 72 hours. Objective:   Vitals: /70   Pulse 102   Temp 97.6 °F (36.4 °C) (Oral)   Resp 16   Ht 5' 1\" (1.549 m)   Wt 205 lb 4 oz (93.1 kg)   SpO2 96%   BMI 38.78 kg/m²   General appearance: alert and cooperative with exam  HEENT: Head: Normocephalic, no lesions, without obvious abnormality.   Neck: no adenopathy  Lungs:decrease bilaterally bases  Heart: regular rate and rhythm, S1, S2 normal, no murmur, click, rub or gallop  Abdomen: soft, non-tender; bowel sounds normal; no masses,  no organomegaly  Extremities: extremities normal, atraumatic, no cyanosis or edema  Neurologic: Mental status: Alert, oriented, thought content appropriate    Assessment and Plan:   59year old female with significant smoking history admitted with SIADH, lung mass of left upper lobe    -Awaiting IR guided biopsy scheduled for 9/30/19  -Patient aware of mass and plan, once pathological diagnosis made can discuss treatment options and prognosis  -Please call with questions.      Tanya Perkins MD

## 2019-09-28 NOTE — CONSULTS
[address] [facility]                 [address] [facility], [address]                                  CONSULTATION    PATIENT NAME: Mookie aHrris                     :        1955  MED REC NO:   [id number]                              ROOM:       2081  ACCOUNT NO:   [de-identified]                           ADMIT DATE: 2019  PROVIDER:     Shirlee Sandifer    CONSULT DATE:  2019    CHIEF COMPLAINT:  Lung mass. HISTORY OF PRESENT ILLNESS:  The patient is a 66-year-old female with a  longstanding history of smoking. She says that she smoked one and a  half packs per day since age 12. She was admitted with nausea, vomiting  and fatigue and she was found to have a sodium of 117. Subsequently, a  diagnosis of SIADH was made. The patient also was found to have a mass  on chest x-ray and a CAT scan revealed a 3 cm left upper lobe mass with  associated mediatinal adenopathy. The patient denies any hemoptysis. Denies any fevers, chills, weight  loss, pleurisy or night sweats. She has no tuberculosis exposure. She  says that she was diagnosed with COPD but she is not sure how that  diagnosis was made. She is only on albuterol inhaler. She is not  normally on oxygen. ALLERGIES:  VESICARE. MEDICATIONS:  Medication list was reviewed. She is getting DuoNeb  aerosols but no steroids or antibiotics. PAST MEDICAL HISTORY:  Significant for vitamin D deficiency, TMJ pain  and some sort of depressive disorder. She had a IRIS and BSO. SOCIAL HISTORY:  As noted above, she is a heavy smoker. Denies any  alcohol, illicit drug use. FAMILY HISTORY:  Notable for a sister who had small-cell lung cancer,  who  at age 62. REVIEW OF SYSTEMS:  As in history of present illness, all other systems  reviewed and are otherwise negative.     PHYSICAL EXAMINATION:  GENERAL APPEARANCE:  Middle-aged female, looking older than her stated  age, in no acute respiratory distress. VITALS:  Temperature 97.9, respiratory rate 14-16, pulse 98, blood  pressure is 130/80. HEENT:  Oral cavity is clear with macroglossia and  micrognathia. LUNGS:  Notable for diffuse bilateral wheezes. Expiratory in nature. No rhonchi. CARDIAC:  S1, S2.  ABDOMEN:  Obese. EXTREMITIES:  Showed no peripheral edema. NEUROLOGICAL:  There are no focal deficits. LABORATORY DATA:  Labs, today her sodium is 125, potassium 4.1, chloride  91, bicarb 21, BUN of 4, creatinine 0.74. Her white count on admission  was 8.4 with an H and H of 12.2 and 36.5 and platelet count of 025. IMPRESSION:  My impression is that the patient has COPD exacerbation,  left upper lobe lung mass and probable sleep apnea with hyponatremia due  to SIADH and tobacco dependence. At this juncture, we are going to go  ahead and put her on her nebulizers, continue them at four times a day,  add IV Solu-Medrol. We discussed with her smoking cessation. She will  need a CT-guided biopsy of left upper lobe mass on Monday and I have  contacted Interventional Radiology about this already. I did speak to  Dr. Lopez Reagan. If the biopsy is nondiagnostic, she can be discharged and  we will schedule an outpatient EBUS procedure. She will obviously need  additional staging including PET scan. In terms of her sleep apnea, she  will probably need a sleep study at some point but for now, we will  continue to monitor her closely for over-sedation during procedures.         Arely Ro    D: 09/27/2019 14:56:55       T: 09/27/2019 15:07:38     [id number]  Job#: [id number]     Doc#: [id number]    CC:

## 2019-09-28 NOTE — PROGRESS NOTES
Had large emesis of brown fluid. Patient did not eat her supper yet. Zofran given. Abdomen slightly distended but active bowel sounds heard. Patient states that she has not had a bm for 4 days.

## 2019-09-28 NOTE — PROGRESS NOTES
Heart sounds are normal.  Regular rate and rhythm   Abdomen - Soft, nontender, nondistended, no masses or organomegaly  Neurologic - There are no focal motor or sensory deficits  Skin - No bruising or bleeding  Extremities - No clubbing, cyanosis, edema    MEDS      docusate sodium  100 mg Oral Daily    methylPREDNISolone  40 mg Intravenous Q6H    mometasone-formoterol  2 puff Inhalation BID    nicotine  1 patch Transdermal Daily    ipratropium-albuterol  1 ampule Inhalation Q4H WA    QUEtiapine  100 mg Oral Nightly    sodium chloride  1 g Oral TID     influenza virus vaccine  0.5 mL Intramuscular Once    sodium chloride flush  10 mL Intravenous 2 times per day    enoxaparin  40 mg Subcutaneous Daily    famotidine  20 mg Oral BID    midodrine  10 mg Oral BID    therapeutic multivitamin-minerals  1 tablet Oral Daily    simvastatin  40 mg Oral Nightly       sodium chloride flush, sodium chloride flush, albuterol sulfate HFA, potassium chloride **OR** potassium alternative oral replacement **OR** potassium chloride, sodium chloride flush, acetaminophen, LORazepam, ondansetron    LABS   CBC   Recent Labs     09/25/19  1820   WBC 8.4   HGB 12.2   HCT 36.5   MCV 84.3        BMP:   Lab Results   Component Value Date     09/28/2019    K 3.8 09/28/2019    CL 94 09/28/2019    CO2 19 09/28/2019    BUN 7 09/28/2019    LABALBU 3.7 09/25/2019    LABALBU 4.3 05/17/2012    CREATININE 0.65 09/28/2019    CALCIUM 9.5 09/28/2019    GFRAA >60 09/28/2019    LABGLOM >60 09/28/2019     ABGs:No results found for: PHART, PO2ART, QWR2CHU No results found for: IFIO2, MODE, SETTIDVOL, SETPEEP  Ionized Calcium:  No results found for: IONCA  Magnesium:    Lab Results   Component Value Date    MG 1.8 09/25/2019     Phosphorus:  No results found for: PHOS     LIVER PROFILE   Recent Labs     09/25/19  1820   AST 54*   ALT 54*   LIPASE 37   BILITOT 0.47   ALKPHOS 249*     INR No results for input(s): INR in the last 72

## 2019-09-29 NOTE — PROGRESS NOTES
NEPHROLOGY PROGRESS NOTE    Patient :  Heena White; 59 y.o. MRN# [id number]  Location:  2081/2081-01  Attending:  New Garcia MD  Admit Date:  9/25/2019   Hospital Day: 4      Reason for Consult: Hyponatremia      Chief Complaint:    History Obtained From: Patient    History of Present Illness: This is a 59 y.o. female With past medical history of kidney stones anxiety depression,Presented to the hospital with complaints of nausea vomiting and fatigue That started 3 4 days ago. Patient also complains of difficulty urination denied any dysuria or burning,+Incontinence  Patient was prescribed Keflex as an outpatient,Her symptoms did not improve,Patient also started to have nausea vomiting and therefore patient decided to come to the hospital    Initial investigation in the ER labs showed serum sodium of 117 And therefore nephrology consultation has been requested      Pt denies any history of  prolonged NSAID use. There has been no recent exposure to IV contrast.   There is no history  of paraprotein disease. Pt denies any history of recurrent UTI ,Patient has a history of kidney stones. Medication review shows use of SSRI Lexapro,No hydrochlorothiazide  Patient also drinks 9 to 10 cups of water since patient is having nausea vomiting      Subjective/interval hx    Patient seen and examined , denies nay N/V.  SNa improved 132   CT chest which showed left Upper lobe pulmonary nodule 3x2.3 cm, extensive mediastinal lymphadenopathy, suspicious hepatic lesions metastasis? SNa improving   S/p Tolvaptan yesterday.     Past Medical History:        Diagnosis Date    Atypical depressive disorder     Hypotension, unspecified     Mitral valve disorders(424.0)     Obstructive chronic bronchitis with exacerbation (HCC)     Other and unspecified hyperlipidemia     Other malaise and fatigue     Temporomandibular joint disorders, unspecified     Unspecified breast disorder     Unspecified vitamin D deficiency        Past Surgical History:        Procedure Laterality Date    IRIS AND BSO         Current Medications:      [START ON 2019] predniSONE (DELTASONE) tablet 40 mg Daily   ipratropium (ATROVENT) 0.02 % nebulizer solution 0.5 mg 4x daily   levalbuterol (XOPENEX) nebulizer solution 1.25 mg Q4H PRN   levalbuterol (XOPENEX) nebulizer solution 1.25 mg 4x Daily   sodium chloride flush 0.9 % injection 10 mL PRN   docusate sodium (COLACE) capsule 100 mg Daily   busPIRone (BUSPAR) tablet 10 mg TID   sodium chloride flush 0.9 % injection 10 mL PRN   mometasone-formoterol (DULERA) 200-5 MCG/ACT inhaler 2 puff BID   potassium chloride (KLOR-CON M) extended release tablet 40 mEq PRN   Or    potassium bicarb-citric acid (EFFER-K) effervescent tablet 40 mEq PRN   Or    potassium chloride 10 mEq/100 mL IVPB (Peripheral Line) PRN   nicotine (NICODERM CQ) 21 MG/24HR 1 patch Daily   QUEtiapine (SEROQUEL) tablet 100 mg Nightly   sodium chloride tablet 1 g TID WC   sodium chloride flush 0.9 % injection 10 mL 2 times per day   sodium chloride flush 0.9 % injection 10 mL PRN   acetaminophen (TYLENOL) tablet 650 mg Q4H PRN   enoxaparin (LOVENOX) injection 40 mg Daily   famotidine (PEPCID) tablet 20 mg BID   LORazepam (ATIVAN) tablet 0.5 mg BID PRN   midodrine (PROAMATINE) tablet 10 mg BID   therapeutic multivitamin-minerals 1 tablet Daily   ondansetron (ZOFRAN-ODT) disintegrating tablet 4 mg Q8H PRN   simvastatin (ZOCOR) tablet 40 mg Nightly       Allergies:  Vesicare [solifenacin]  Objective:  CURRENT TEMPERATURE:  Temp: 98.1 °F (36.7 °C)  MAXIMUM TEMPERATURE OVER 24HRS:  Temp (24hrs), Av.4 °F (36.9 °C), Min:98.1 °F (36.7 °C), Max:98.6 °F (37 °C)    CURRENT RESPIRATORY RATE:  Resp: 18  CURRENT PULSE:  Pulse: 100  CURRENT BLOOD PRESSURE:  BP: 129/64  24HR BLOOD PRESSURE RANGE:  Systolic (64MMR), QXE:413 , Min:114 , YPQ:587   ; Diastolic (64DUR), EWU:90, Min:64, Max:74    24HR INTAKE/OUTPUT:      Intake/Output Summary (Last 24 hours) at 9/29/2019 1720  Last data filed at 9/29/2019 1423  Gross per 24 hour   Intake 590 ml   Output 1050 ml   Net -460 ml     Patient Vitals for the past 96 hrs (Last 3 readings):   Weight   09/26/19 0614 205 lb 4 oz (93.1 kg)   09/25/19 2134 200 lb 2.8 oz (90.8 kg)   09/25/19 1739 195 lb (88.5 kg)       Physical Exam:  GENERAL APPEARANCE: Alert and cooperative, and appears to be in no acute distress. HEAD: normocephalic  EYES: PERRL, EOMI. Not pale, anicteric   NOSE:  No nasal discharge. THROAT:  Oral cavity and pharynx normal. Moist  CARDIAC: Normal S1 and S2. No S3, S4 or murmurs. Rhythm is regular. LUNGS: Clear to auscultation and percussion without rales, rhonchi, wheezing or diminished breath sounds. NECK: Neck supple, non-tender without lymphadenopathy, masses or thyromegaly. JVD-neg  BACK: Examination of the spine reveals normal gait and posture, no spinal deformity, symmetry of spinal muscles, without tenderness, decreased range of motion or muscular spasm  MUSKULOSKELETAL: Adequately aligned spine. No joint erythema or tenderness. EXTREMITIE+ edema. Peripheral pulses intact. NEURO:Nonfocal      Labs:   CBC:  No results for input(s): WBC, RBC, HGB, HCT, MCV, MCH, MCHC, RDW, PLT, MPV in the last 72 hours. BMP:   Recent Labs     09/28/19  2353 09/29/19  0551 09/29/19  1159   * 129* 132*   K 4.2 4.4 4.6   CL 95* 95* 97*   CO2 22 23 22   BUN 9 8 10   CREATININE 0.77 0.69 0.83   GLUCOSE 153* 156* 173*   CALCIUM 9.2 9.2 9.6      Phosphorus:  No results for input(s): PHOS in the last 72 hours. Magnesium:   No results for input(s): MG in the last 72 hours. Albumin:   No results for input(s): LABALBU in the last 72 hours. Radiology:  Reviewed as available.     Assessment:    1-Hyponatremia, Hypoosmolar hyponatremia, Urine sodium was high 102 and urine osmolality is high 367 indicating high ADH Secretion consistent with SIADH likely contributing factors include use of SSRI Lexapro. SNa improving. 2.  Microscopic hematuria  UA positive for blood +5200 RBC 5-10 WBC large hemoglobin negative leukocyte Estrace, negative nitrite. 3. History of kidney stones    4. left Upper lobe pulmonary nodule 3x2.3 cm, extensive mediastinal lymphadenopathy, suspicious hepatic lesions metastasis? Plan:  Fluid restriction less than 1500 mL's per day  No need for Tolvaptan today    Thank you for the consultation.       Electronically signed by Raul Gifford MD on 9/29/2019 at 5:20 PM

## 2019-09-29 NOTE — PROGRESS NOTES
LEIDA Pickard 53    HISTORY AND PHYSICAL EXAMINATION            Date:   9/29/2019  Patient name:  Courtney Yu  Date of admission:  9/25/2019  6:05 PM  MRN:   [id number]  Account:  [de-identified]  YOB: 1955  PCP:    Riley Joshi MD  Room:   2081/2081-01  Code Status:    Full Code    Chief Complaint:     Chief Complaint   Patient presents with    Nausea & Vomiting    Fatigue       History Obtained From:     patient, electronic medical record    History of Present Illness: The patient is a 59 y.o. Non-/non  female who presents with Nausea & Vomiting and Fatigue   and she is admitted to the hospital for the management of  Fatigue and Tiredness , q. Patient has multiple medical problems which include chronic smoking, COPD, gastroesophageal reflux disease, hiatus hernia, bipolar disorder. Patient mentioned that she has symptoms of pressure in her lower abdomen, dysuria. Symptoms are going on for last few days. She went to the emergency room. She was diagnosed with UTI and possible pyelonephritis. Patient was prescribed Keflex. Patient mentioned that she never felt better after even taking antibiotics. She is feeling extremely weak, fatigue, tired. She was throwing up. She was drinking a lot of water. She came to emergency room. She was found to have a sodium of 117. Her urine sodium was 102. Patient is on Lexapro and Seroquel  9/27  Patient not feeling any better, started on oral salt tablets, plan to give tolvaptan, patient is on fluid restriction  Chest x-ray suggestive of left mid zone ,  obesity, she had CT scan lung screening last year in October last year, no lung capacity was appreciated at that point in time.   Past Medical History:     Past Medical History:   Diagnosis Date    Atypical depressive disorder     Hypotension, unspecified     Mitral valve disorders(424.0)     Obstructive chronic bronchitis with by mouth nightly  16  Yes Historical Provider, MD        Allergies:     Vesicare [solifenacin]    Social History:     Tobacco:    reports that she has been smoking cigarettes. She has a 42.00 pack-year smoking history. She has never used smokeless tobacco.  Alcohol:      reports that she does not drink alcohol. Drug Use:  reports that she does not use drugs. Family History:     Family History   Problem Relation Age of Onset    Cancer Mother     Asthma Father     Cancer Sister     Stroke Sister        Review of Systems:     Positive and Negative as described in HPI. CONSTITUTIONAL:  negative for fevers, chills, sweats, fatigue, weight loss  HEENT:  negative for vision, hearing changes, runny nose, throat pain  RESPIRATORY:  Cough , SOB with Exertion   CARDIOVASCULAR:  negative for chest pain, palpitations. GASTROINTESTINAL:  Nausea/Vomiting   GENITOURINARY:  negative for difficulty of urination, burning with urination, frequency   INTEGUMENT:  negative for rash, skin lesions, easy bruising   HEMATOLOGIC/LYMPHATIC:  negative for swelling/edema   ALLERGIC/IMMUNOLOGIC:  negative for urticaria , itching  ENDOCRINE:  negative increase in drinking, increase in urination, hot or cold intolerance  MUSCULOSKELETAL:  negative joint pains, muscle aches, swelling of joints  NEUROLOGICAL:  negative for headaches, dizziness, lightheadedness, numbness, pain, tingling extremities  BEHAVIOR/PSYCH:  negative for depression, anxiety    Physical Exam:   /67   Pulse 103   Temp 98.6 °F (37 °C) (Oral)   Resp 17   Ht 5' 1\" (1.549 m)   Wt 205 lb 4 oz (93.1 kg)   SpO2 92%   BMI 38.78 kg/m²   Temp (24hrs), Av.3 °F (36.8 °C), Min:97.7 °F (36.5 °C), Max:98.6 °F (37 °C)    No results for input(s): POCGLU in the last 72 hours.     Intake/Output Summary (Last 24 hours) at 2019 1214  Last data filed at 2019 0631  Gross per 24 hour   Intake 290 ml   Output 300 ml   Net -10 ml       General Appearance: enymes ,Hepatitis Panel is negative   4. COPD -  , on DuoNeb nebulization,started on IV Solumedrol   5. CT chest Concerning for mass in left upper lobe, mediastinal adenopathy, patient will need biopsy , pulmonologist and oncologist following, patient had CT abdomen pelvis results are awaited  6. patient is on DVT prophylaxis  7. GERD, patient is on Pepcid, controlled  8. Large hiatus hernia  PT following   She was diagnosed with UTI in the emergency room, was on Keflex since 19th of this month, will discontinue antibiotics patient has blood in urine, US Kidney Suggestive of Right Renal Non obstructive calculus   9. Hypokalemia , on Replacement Protocol     9/29   Patient is doing much better  Last sodium is 129, received 2 dose of tolvaptan  Plan for IR guided lung biopsy tomorrow      Consultations:   IP CONSULT TO INTERNAL MEDICINE  IP CONSULT TO NEPHROLOGY  IP CONSULT TO PULMONOLOGY  IP CONSULT TO HEM/ONC     Patient is admitted as inpatient status because of co-morbidities listed above, severity of signs and symptoms as outlined, requirement for current medical therapies and most importantly because of direct risk to patient if care not provided in a hospital setting. Mary Vitale MD  9/29/2019  12:14 PM    Copy sent to Dr. Mary Vitale MD    Please note that this chart was generated using voice recognition Dragon dictation software. Although every effort was made to ensure the accuracy of this automated transcription, some errors in transcription may have occurred.

## 2019-09-29 NOTE — PROGRESS NOTES
Respirations easy and unlabored. Abdominal: Soft. Bowel sounds are normal. There is no tenderness. Musculoskeletal: Normal range of motion. Neurological:patient is alert and oriented to person, place, and time. Skin: Skin is warm and dry. No rash noted.    Extremities: No edema or discoloration  Infusions:     Meds:     Current Facility-Administered Medications:     sodium chloride flush 0.9 % injection 10 mL, 10 mL, Intravenous, PRN, Huey Zamarripa MD, 10 mL at 09/28/19 1132    docusate sodium (COLACE) capsule 100 mg, 100 mg, Oral, Daily, Ninoska Mondragon MD, 100 mg at 09/29/19 0844    methylPREDNISolone sodium (SOLU-MEDROL) injection 40 mg, 40 mg, Intravenous, Q8H, AUSTEN Haywood CNP, 40 mg at 09/29/19 0846    ipratropium-albuterol (DUONEB) nebulizer solution 1 ampule, 1 ampule, Inhalation, 4x daily, AUSTEN Haywood CNP, 1 ampule at 09/29/19 0706    busPIRone (BUSPAR) tablet 10 mg, 10 mg, Oral, TID, AUSTEN Haywood CNP, 10 mg at 09/29/19 0842    sodium chloride flush 0.9 % injection 10 mL, 10 mL, Intravenous, PRN, Ninoska Mondragon MD, 10 mL at 09/27/19 1145    albuterol sulfate  (90 Base) MCG/ACT inhaler 2 puff, 2 puff, Inhalation, Q6H PRN, Azalia Munson MD    mometasone-formoterol (DULERA) 200-5 MCG/ACT inhaler 2 puff, 2 puff, Inhalation, BID, Azalia Munson MD, 2 puff at 09/29/19 0840    potassium chloride (KLOR-CON M) extended release tablet 40 mEq, 40 mEq, Oral, PRN, 40 mEq at 09/26/19 1021 **OR** potassium bicarb-citric acid (EFFER-K) effervescent tablet 40 mEq, 40 mEq, Oral, PRN **OR** potassium chloride 10 mEq/100 mL IVPB (Peripheral Line), 10 mEq, Intravenous, PRN, Talon Amezcua MD    nicotine (NICODERM CQ) 21 MG/24HR 1 patch, 1 patch, Transdermal, Daily, Ninoska Mondragon MD, 1 patch at 09/29/19 0847    QUEtiapine (SEROQUEL) tablet 100 mg, 100 mg, Oral, Nightly, Sanford Bradshaw MD, 100 mg at 09/28/19 2048    sodium chloride tablet 1 g, 1 g, Oral, TID WC, Breanna Moreno MD, 1 g at 09/29/19 0844    influenza quadrivalent split vaccine (FLUZONE;FLUARIX;FLULAVAL;AFLURIA) injection 0.5 mL, 0.5 mL, Intramuscular, Once, Jodee Barrera MD    sodium chloride flush 0.9 % injection 10 mL, 10 mL, Intravenous, 2 times per day, Jodee Barrera MD, 10 mL at 09/29/19 0846    sodium chloride flush 0.9 % injection 10 mL, 10 mL, Intravenous, PRN, Jodee Barrera MD    acetaminophen (TYLENOL) tablet 650 mg, 650 mg, Oral, Q4H PRN, Jodee Barrera MD, 650 mg at 09/26/19 2305    enoxaparin (LOVENOX) injection 40 mg, 40 mg, Subcutaneous, Daily, Jodee Barrera MD, 40 mg at 09/28/19 0902    famotidine (PEPCID) tablet 20 mg, 20 mg, Oral, BID, Jodee Barrera MD, 20 mg at 09/29/19 0843    LORazepam (ATIVAN) tablet 0.5 mg, 0.5 mg, Oral, BID PRN, Jodee Barrera MD, 0.5 mg at 09/29/19 1005    midodrine (PROAMATINE) tablet 10 mg, 10 mg, Oral, BID, Jodee Barrera MD, 10 mg at 09/29/19 0843    therapeutic multivitamin-minerals 1 tablet, 1 tablet, Oral, Daily, Jodee Barrera MD, 1 tablet at 09/29/19 0843    ondansetron (ZOFRAN-ODT) disintegrating tablet 4 mg, 4 mg, Oral, Q8H PRN, Jodee Barrera MD, 4 mg at 09/28/19 1742    simvastatin (ZOCOR) tablet 40 mg, 40 mg, Oral, Nightly, Jodee Barrera MD, 40 mg at 09/28/19 2048    Lab Results:       Lab Results   Component Value Date    WBC 8.4 09/25/2019    HGB 12.2 09/25/2019    HCT 36.5 09/25/2019    MCV 84.3 09/25/2019     09/25/2019     Lab Results   Component Value Date    CALCIUM 9.2 09/29/2019     (L) 09/29/2019    K 4.4 09/29/2019    CO2 23 09/29/2019    CL 95 (L) 09/29/2019    BUN 8 09/29/2019    CREATININE 0.69 09/29/2019       Lab Results   Component Value Date    INR 1.2 09/29/2019    PROTIME 14.8 (H) 09/29/2019       Radiology:   No recent imaging    ASSESSMENT:   Active Problems:    COPD (chronic obstructive pulmonary disease) (HCC)    Atypical depressive disorder

## 2019-09-30 NOTE — PROGRESS NOTES
(BUSPAR) tablet 10 mg TID   sodium chloride flush 0.9 % injection 10 mL PRN   mometasone-formoterol (DULERA) 200-5 MCG/ACT inhaler 2 puff BID   potassium chloride (KLOR-CON M) extended release tablet 40 mEq PRN   Or    potassium bicarb-citric acid (EFFER-K) effervescent tablet 40 mEq PRN   Or    potassium chloride 10 mEq/100 mL IVPB (Peripheral Line) PRN   nicotine (NICODERM CQ) 21 MG/24HR 1 patch Daily   QUEtiapine (SEROQUEL) tablet 100 mg Nightly   sodium chloride tablet 1 g TID WC   sodium chloride flush 0.9 % injection 10 mL 2 times per day   sodium chloride flush 0.9 % injection 10 mL PRN   acetaminophen (TYLENOL) tablet 650 mg Q4H PRN   enoxaparin (LOVENOX) injection 40 mg Daily   famotidine (PEPCID) tablet 20 mg BID   LORazepam (ATIVAN) tablet 0.5 mg BID PRN   midodrine (PROAMATINE) tablet 10 mg BID   therapeutic multivitamin-minerals 1 tablet Daily   ondansetron (ZOFRAN-ODT) disintegrating tablet 4 mg Q8H PRN   simvastatin (ZOCOR) tablet 40 mg Nightly       Allergies:  Vesicare [solifenacin]  Objective:  CURRENT TEMPERATURE:  Temp: 98 °F (36.7 °C)  MAXIMUM TEMPERATURE OVER 24HRS:  Temp (24hrs), Av.4 °F (36.9 °C), Min:97.5 °F (36.4 °C), Max:99.1 °F (37.3 °C)    CURRENT RESPIRATORY RATE:  Resp: 18  CURRENT PULSE:  Pulse: 100  CURRENT BLOOD PRESSURE:  BP: 111/64  24HR BLOOD PRESSURE RANGE:  Systolic (59XHE), PUK:646 , Min:111 , BGB:099   ; Diastolic (20BLQ), BKS:88, Min:63, Max:88    24HR INTAKE/OUTPUT:    No intake or output data in the 24 hours ending 19 8532  No data found. Physical Exam:  GENERAL APPEARANCE: Alert and cooperative, and appears to be in no acute distress. HEAD: normocephalic  EYES: PERRL, EOMI. Not pale, anicteric   NOSE:  No nasal discharge. THROAT:  Oral cavity and pharynx normal. Moist  CARDIAC: Normal S1 and S2. No S3, S4 or murmurs. Rhythm is regular. LUNGS: Clear to auscultation and percussion without rales, rhonchi, wheezing or diminished breath sounds.   NECK: Neck supple, non-tender without lymphadenopathy, masses or thyromegaly. JVD-neg  BACK: Examination of the spine reveals normal gait and posture, no spinal deformity, symmetry of spinal muscles, without tenderness, decreased range of motion or muscular spasm  MUSKULOSKELETAL: Adequately aligned spine. No joint erythema or tenderness. EXTREMITIE+ edema. Peripheral pulses intact. NEURO:Nonfocal      Labs:   CBC:  No results for input(s): WBC, RBC, HGB, HCT, MCV, MCH, MCHC, RDW, PLT, MPV in the last 72 hours. BMP:   Recent Labs     09/29/19  0551 09/29/19  1159 09/30/19  0541   * 132* 134*   K 4.4 4.6 3.8   CL 95* 97* 99   CO2 23 22 23   BUN 8 10 14   CREATININE 0.69 0.83 0.79   GLUCOSE 156* 173* 95   CALCIUM 9.2 9.6 8.8      Phosphorus:  No results for input(s): PHOS in the last 72 hours. Magnesium:   No results for input(s): MG in the last 72 hours. Albumin:   No results for input(s): LABALBU in the last 72 hours. Radiology:  Reviewed as available. Assessment:    1-Hyponatremia, Hypoosmolar hyponatremia, Urine sodium was high 102 and urine osmolality is high 367 indicating high ADH Secretion consistent with SIADH likely contributing factors include use of SSRI Lexapro. SNa improving. 2.  Microscopic hematuria  UA positive for blood +5200 RBC 5-10 WBC large hemoglobin negative leukocyte Estrace, negative nitrite. 3. History of kidney stones    4. left Upper lobe pulmonary nodule 3x2.3 cm, extensive mediastinal lymphadenopathy, suspicious hepatic lesions metastasis? -S/P  percutaneous lung biopsy today    Plan:  Fluid restriction less than 1500 mL's per day  Strict I/Os  Monitor serum sodium daily    Thank you for the consultation.       Electronically signed by Shannon Arndt MD on 9/30/2019 at 5:34 PM

## 2019-09-30 NOTE — PROGRESS NOTES
Physical Therapy  DATE: 2019    NAME: Jarvis Shields  MRN: [id number]   : 1955    Patient not seen this date for Physical Therapy due to:  [] Blood transfusion in progress  [] Hemodialysis  []  Patient Declined  [] Spine Precautions   [] Strict Bedrest  [] Surgery/ Procedure  [] Testing      [x] Other PT refuses PT at this time despite encouragement. PT is on the phone upon arrival. \"Please not right now\". RN informed. Pt agreeable to PT tomorrow. [] PT being discontinued at this time. Patient independent. No further needs. [] PT being discontinued at this time as the patient has been transferred to palliative care. No further needs.     Alina Rasheed, PTA

## 2019-09-30 NOTE — PROGRESS NOTES
past medical history of Atypical depressive disorder, Hypotension, unspecified, Mitral valve disorders(424.0), Obstructive chronic bronchitis with exacerbation ([address] Other and unspecified hyperlipidemia, Other malaise and fatigue, Temporomandibular joint disorders, unspecified, Unspecified breast disorder, and Unspecified vitamin D deficiency. Social History:   reports that she has been smoking cigarettes. She has a 42.00 pack-year smoking history. She has never used smokeless tobacco. She reports that she does not drink alcohol or use drugs. Family History:   Family History   Problem Relation Age of Onset   [employer] Cancer Mother     Asthma Father     Cancer Sister     Stroke Sister        Vitals:  /79   Pulse 91   Temp 99.1 °F (37.3 °C) (Oral)   Resp 16   Ht 5' 1\" (1.549 m)   Wt 205 lb 4 oz (93.1 kg)   SpO2 94%   BMI 38.78 kg/m²   Temp (24hrs), Av.4 °F (36.9 °C), Min:97.5 °F (36.4 °C), Max:99.1 °F (37.3 °C)    No results for input(s): POCGLU in the last 72 hours. I/O (24Hr): Intake/Output Summary (Last 24 hours) at 2019 09  Last data filed at 2019 1732  Gross per 24 hour   Intake 450 ml   Output 950 ml   Net -500 ml       Labs:    Hematology:  Recent Labs     19  0551   PROTIME 14.8*   APTT 25.5   INR 1.2     Chemistry:  Recent Labs     19  0551 19  1159 19  0541   * 132* 134*   K 4.4 4.6 3.8   CL 95* 97* 99   CO2 23 22 23   GLUCOSE 156* 173* 95   BUN 8 10 14   CREATININE 0.69 0.83 0.79   ANIONGAP 11 13 12   LABGLOM >60 >60 >60   GFRAA >60 >60 >60   CALCIUM 9.2 9.6 8.8     No results for input(s): PROT, LABALBU, EAG, N9QLXJN, X1ERCYR, FT4, TSH, CORTISOL, PROLACTIN, AST, ALT, LDH, GGT, ALKPHOS, LABGGT, BILITOT, BILIDIR, AMMONIA, AMYLASE, LIPASE, LACTATE, CHOL, HDL, LDLCHOLESTEROL, CHOLHDLRATIO, TRIG, VLDL, PHENYTOIN, PHENYF, VALPROATE in the last 72 hours. Glucose:No results for input(s): LABA1C, POCGLU, LABINSU in the last 72 hours.       Lab

## 2019-09-30 NOTE — PLAN OF CARE
Problem: Risk for Impaired Skin Integrity  Goal: Tissue integrity - skin and mucous membranes  Description  Structural intactness and normal physiological function of skin and  mucous membranes. Outcome: Met This Shift  Note:   Patient has no skin breakdown noted. Patient turns self in bed appropriately. Problem: Daily Care:  Goal: Daily care needs are met  Description  Daily care needs are met  Outcome: Met This Shift  Note:   Patient is able to make needs known and functions independently. Problem: Mobility - Impaired:  Goal: Mobility will improve  Description  Mobility will improve  Outcome: Met This Shift     Problem: Falls - Risk of:  Goal: Will remain free from falls  Description  Will remain free from falls  Outcome: Met This Shift  Note:   Patient is up per self with steady gait noted. Patient is SOB with exertion. Pt assessed as a fall risk this shift. Remains free from falls and accidental injury at this time. Fall precautions in place, including falling star sign and fall risk band on pt. Floor free from obstacles, and bed is locked and in lowest position. Adequate lighting provided. Pt encouraged to call before getting OOB for any need. Bed alarm activated. Will continue to monitor needs during hourly rounding, and reinforce education on use of call light.     Goal: Absence of physical injury  Description  Absence of physical injury  Outcome: Met This Shift

## 2019-09-30 NOTE — PROGRESS NOTES
Pulmonary Progress Note  [facility]      Patient - Redd Porter,  Age - 59 y.o.    - 1955      Room Number - 2081/2081-01   N -  [id number]   [facility]  Date of Admission -  2019  6:05 PM    Consulting Anca Hernandez MD  Primary Care Physician - Vitaliy Ji MD     SUBJECTIVE   Patient appears to be in fair spirits. She is status post CT-guided biopsy. She is having some dyspnea. O2 saturations were in the 80s earlier today on oxygen. She is feeling better on the oxygen. I did run with the bedside RN. OBJECTIVE   VITALS    height is 5' 1\" (1.549 m) and weight is 205 lb 4 oz (93.1 kg). Her oral temperature is 98 °F (36.7 °C). Her blood pressure is 111/64 and her pulse is 100. Her respiration is 18 and oxygen saturation is 94%. Body mass index is 38.78 kg/m². Temperature Range: Temp: 98 °F (36.7 °C) Temp  Av.3 °F (36.8 °C)  Min: 97.5 °F (36.4 °C)  Max: 99.1 °F (37.3 °C)  BP Range:  Systolic (05JJJ), IWS:235 , Min:111 , EWL:568     Diastolic (68JCY), LPT:34, Min:63, Max:88    Pulse Range: Pulse  Av.8  Min: 91  Max: 103  Respiration Range: Resp  Av.8  Min: 14  Max: 21  Current Pulse Ox[de-identified]  SpO2: 94 %  24HR Pulse Ox Range:  SpO2  Av.4 %  Min: 90 %  Max: 96 %  Oxygen Amount and Delivery: O2 Flow Rate (L/min): 3 L/min    Wt Readings from Last 3 Encounters:   19 205 lb 4 oz (93.1 kg)   19 197 lb (89.4 kg)   19 192 lb (87.1 kg)       I/O (24 Hours)    Intake/Output Summary (Last 24 hours) at 2019 1311  Last data filed at 2019 1732  Gross per 24 hour   Intake 200 ml   Output 600 ml   Net -400 ml       EXAM     General Appearance  Awake, alert, oriented, in no acute distress  HEENT - normocephalic, atraumatic. Neck - Supple,  trachea midline   Lungs -coarse no crackles rales or wheeze  Heart Exam:PMI normal. No lifts, heaves, or thrills. RRR.  No murmurs, clicks, gallops, or rubs  Abdomen Exam: Abdomen soft, non-tender. BS normal.  Extremity Exam: No edema    MEDS      [START ON 10/1/2019] predniSONE  20 mg Oral Daily    ipratropium  0.5 mg Nebulization 4x daily    levalbuterol  1.25 mg Nebulization 4x Daily    docusate sodium  100 mg Oral Daily    busPIRone  10 mg Oral TID    mometasone-formoterol  2 puff Inhalation BID    nicotine  1 patch Transdermal Daily    QUEtiapine  100 mg Oral Nightly    sodium chloride  1 g Oral TID WC    sodium chloride flush  10 mL Intravenous 2 times per day    enoxaparin  40 mg Subcutaneous Daily    famotidine  20 mg Oral BID    midodrine  10 mg Oral BID    therapeutic multivitamin-minerals  1 tablet Oral Daily    simvastatin  40 mg Oral Nightly       levalbuterol, sodium chloride flush, sodium chloride flush, potassium chloride **OR** potassium alternative oral replacement **OR** potassium chloride, sodium chloride flush, acetaminophen, LORazepam, ondansetron    LABS   CBC No results for input(s): WBC, HGB, HCT, MCV, PLT in the last 72 hours. BMP:   Lab Results   Component Value Date     09/30/2019    K 3.8 09/30/2019    CL 99 09/30/2019    CO2 23 09/30/2019    BUN 14 09/30/2019    LABALBU 3.7 09/25/2019    LABALBU 4.3 05/17/2012    CREATININE 0.79 09/30/2019    CALCIUM 8.8 09/30/2019    GFRAA >60 09/30/2019    LABGLOM >60 09/30/2019     ABGs:No results found for: PHART, PO2ART, EOX9HVV No results found for: IFIO2, MODE, SETTIDVOL, SETPEEP  Ionized Calcium:  No results found for: IONCA  Magnesium:    Lab Results   Component Value Date    MG 1.8 09/25/2019     Phosphorus:  No results found for: PHOS     LIVER PROFILE No results for input(s): AST, ALT, LIPASE, BILIDIR, BILITOT, ALKPHOS in the last 72 hours. Invalid input(s):   AMYLASE,  ALB  INR   Recent Labs     09/29/19  0551   INR 1.2     PTT   Lab Results   Component Value Date    APTT 25.5 09/29/2019         RADIOLOGY     (See actual reports for details)    ASSESSMENT/PLAN Patient Active Problem List   Diagnosis    COPD [facility]chronic obstructive [facility] disease) ([facility] [address]    Atypical depressive disorder    Mitral valve disorder    Other hyperlipidemia    Temporomandibular joint disorder    Vitamin D deficiency    Tobacco abuse    Precordial pain    Adverse drug effect    Idiopathic hypotension    Sweating abnormality    Mild cognitive disorder    Idiopathic hypotension    Contusion of left back wall of thorax    Pain at rest    Inability to ambulate due to knee    Fall with injury    Bipolar disorder in full remission ([address]    Acute cystitis without hematuria    Left elbow contusion    Mobility impaired    Tibial plateau fracture, left    Chronic pain of left knee    Obesity (BMI 35.0-39.9 without comorbidity)    Hyponatremia     Patient may have COPD that could be underdiagnosed. Currently she needs oxygen. We will wean steroids continue with Xopenex. I am ordering a nocturnal oxygen study tonight and hopefully home O2 evaluation Tuesday.   Patient appears to be stable enough to go to Med Surg    Electronically signed by Onesimo Cabrera MD on 9/30/2019 at 1:11 PM

## 2019-09-30 NOTE — PROGRESS NOTES
NEPHROLOGY PROGRESS NOTE    Patient :  Courtney Yu; 59 y.o. MRN# [id number]  Location:  2081/2081-01  Attending:  Samir Jones MD  Admit Date:  9/25/2019   Hospital Day: 5      Reason for Consult: Hyponatremia      Chief Complaint:    History Obtained From: Patient    History of Present Illness: This is a 59 y.o. female With past medical history of kidney stones anxiety depression,Presented to the hospital with complaints of nausea vomiting and fatigue That started 3 4 days ago. Patient also complains of difficulty urination denied any dysuria or burning,+Incontinence  Patient was prescribed Keflex as an outpatient,Her symptoms did not improve,Patient also started to have nausea vomiting and therefore patient decided to come to the hospital  Initial investigation in the ER labs showed serum sodium of 117 And therefore nephrology consultation has been requested. Pt denies any history of  prolonged NSAID use. There has been no recent exposure to IV contrast.  There is no history  of paraprotein disease. Pt denies any history of recurrent UTI ,Patient has a history of kidney stones. Medication review shows use of SSRI Lexapro,No hydrochlorothiazide  Patient also drinks 9 to 10 cups of water since patient is having nausea vomiting    Subjective/interval hx  Patient seen and examined. Serum sodium is improving at 134. Appetite is slowly increasing  CT chest which showed left Upper lobe pulmonary nodule 3 x 2.3 cm, extensive mediastinal lymphadenopathy, suspicious hepatic lesions metastasis? S/p Tolvaptan on 9/28/2019.       Current Medications:      [START ON 10/1/2019] predniSONE (DELTASONE) tablet 20 mg Daily   ipratropium (ATROVENT) 0.02 % nebulizer solution 0.5 mg 4x daily   levalbuterol (XOPENEX) nebulizer solution 1.25 mg Q4H PRN   levalbuterol (XOPENEX) nebulizer solution 1.25 mg 4x Daily   sodium chloride flush 0.9 % injection 10 mL PRN   docusate sodium (COLACE) capsule 100 mg Daily   busPIRone auscultation and percussion without rales, rhonchi, wheezing or diminished breath sounds. NECK: Neck supple, non-tender without lymphadenopathy, masses or thyromegaly. JVD-neg  BACK: Examination of the spine reveals normal gait and posture, no spinal deformity, symmetry of spinal muscles, without tenderness, decreased range of motion or muscular spasm  MUSKULOSKELETAL: Adequately aligned spine. No joint erythema or tenderness. EXTREMITIE+ edema. Peripheral pulses intact. NEURO:Nonfocal      Labs:   CBC:  No results for input(s): WBC, RBC, HGB, HCT, MCV, MCH, MCHC, RDW, PLT, MPV in the last 72 hours. BMP:   Recent Labs     09/29/19  0551 09/29/19  1159 09/30/19  0541   * 132* 134*   K 4.4 4.6 3.8   CL 95* 97* 99   CO2 23 22 23   BUN 8 10 14   CREATININE 0.69 0.83 0.79   GLUCOSE 156* 173* 95   CALCIUM 9.2 9.6 8.8      Phosphorus:  No results for input(s): PHOS in the last 72 hours. Magnesium:   No results for input(s): MG in the last 72 hours. Albumin:   No results for input(s): LABALBU in the last 72 hours. Radiology:  Reviewed as available. Assessment:    1-Hyponatremia, Hypoosmolar hyponatremia, Urine sodium was high 102 and urine osmolality is high 367 indicating high ADH Secretion consistent with SIADH likely contributing factors include use of SSRI Lexapro. SNa improving. 2.  Microscopic hematuria  UA positive for blood +5200 RBC 5-10 WBC large hemoglobin negative leukocyte Estrace, negative nitrite. 3. History of kidney stones    4. left Upper lobe pulmonary nodule 3x2.3 cm, extensive mediastinal lymphadenopathy, suspicious hepatic lesions metastasis? Plan:  Fluid restriction less than 1500 mL's per day  Strict I/Os  Monitor serum sodium daily    Thank you for the consultation.       Electronically signed by Bela Correa MD on 9/30/2019 at 2:48 PM

## 2019-10-01 NOTE — PROGRESS NOTES
mg Oral Daily    ipratropium  0.5 mg Nebulization 4x daily    levalbuterol  1.25 mg Nebulization 4x Daily    docusate sodium  100 mg Oral Daily    busPIRone  10 mg Oral TID    mometasone-formoterol  2 puff Inhalation BID    nicotine  1 patch Transdermal Daily    QUEtiapine  100 mg Oral Nightly    sodium chloride  1 g Oral TID WC    sodium chloride flush  10 mL Intravenous 2 times per day    enoxaparin  40 mg Subcutaneous Daily    famotidine  20 mg Oral BID    midodrine  10 mg Oral BID    therapeutic multivitamin-minerals  1 tablet Oral Daily    simvastatin  40 mg Oral Nightly       levalbuterol, sodium chloride flush, sodium chloride flush, potassium chloride **OR** potassium alternative oral replacement **OR** potassium chloride, sodium chloride flush, acetaminophen, LORazepam, ondansetron    LABS   CBC No results for input(s): WBC, HGB, HCT, MCV, PLT in the last 72 hours. BMP:   Lab Results   Component Value Date     10/01/2019    K 3.8 10/01/2019    CL 97 10/01/2019    CO2 23 10/01/2019    BUN 13 10/01/2019    LABALBU 3.7 09/25/2019    LABALBU 4.3 05/17/2012    CREATININE 0.75 10/01/2019    CALCIUM 8.8 10/01/2019    GFRAA >60 10/01/2019    LABGLOM >60 10/01/2019     ABGs:No results found for: PHART, PO2ART, AWE7OPE No results found for: IFIO2, MODE, SETTIDVOL, SETPEEP  Ionized Calcium:  No results found for: IONCA  Magnesium:    Lab Results   Component Value Date    MG 1.8 09/25/2019     Phosphorus:  No results found for: PHOS     LIVER PROFILE No results for input(s): AST, ALT, LIPASE, BILIDIR, BILITOT, ALKPHOS in the last 72 hours. Invalid input(s):   AMYLASE,  ALB  INR   Recent Labs     09/29/19  0551   INR 1.2     PTT   Lab Results   Component Value Date    APTT 25.5 09/29/2019         RADIOLOGY     (See actual reports for details)    ASSESSMENT/PLAN     Patient Active Problem List   Diagnosis    COPD (chronic obstructive pulmonary disease) ([facility] [address]    Atypical depressive disorder   

## 2019-10-01 NOTE — PROGRESS NOTES
NEPHROLOGY PROGRESS NOTE    Patient :  Kayla Smith; 59 y.o. MRN# [id number]  Location:  2081/2081-01  Attending:  Azalia Munson MD  Admit Date:  9/25/2019   Hospital Day: 6      Reason for Consult: Hyponatremia      Chief Complaint:    History Obtained From: Patient    History of Present Illness: This is a 59 y.o. female With past medical history of kidney stones anxiety depression,Presented to the hospital with complaints of nausea vomiting and fatigue That started 3 4 days ago. Patient also complains of difficulty urination denied any dysuria or burning,+Incontinence  Patient was prescribed Keflex as an outpatient,Her symptoms did not improve,Patient also started to have nausea vomiting and therefore patient decided to come to the hospital  Initial investigation in the ER labs showed serum sodium of 117 And therefore nephrology consultation has been requested. Pt denies any history of  prolonged NSAID use. There has been no recent exposure to IV contrast.  There is no history  of paraprotein disease. Pt denies any history of recurrent UTI ,Patient has a history of kidney stones. Medication review shows use of SSRI Lexapro,No hydrochlorothiazide  Patient also drinks 9 to 10 cups of water since patient is having nausea vomiting    Subjective/interval hx  Patient seen and examined. Serum sodium improved yesterday  to 134 and now 128. Appetite is slowly increasing  CT chest which showed left Upper lobe pulmonary nodule 3 x 2.3 cm, extensive mediastinal lymphadenopathy, suspicious hepatic lesions metastasis? S/P percutaneous biopsy on 9/30/19. S/p Tolvaptan on 9/28/2019.       Current Medications:      predniSONE (DELTASONE) tablet 20 mg Daily   ipratropium (ATROVENT) 0.02 % nebulizer solution 0.5 mg 4x daily   levalbuterol (XOPENEX) nebulizer solution 1.25 mg Q4H PRN   levalbuterol (XOPENEX) nebulizer solution 1.25 mg 4x Daily   sodium chloride flush 0.9 % injection 10 mL PRN   docusate sodium (COLACE) Rhythm is regular. LUNGS: Clear to auscultation and percussion without rales, rhonchi, wheezing or diminished breath sounds. NECK: Neck supple, non-tender without lymphadenopathy, masses or thyromegaly. JVD-neg  BACK: Examination of the spine reveals normal gait and posture, no spinal deformity, symmetry of spinal muscles, without tenderness, decreased range of motion or muscular spasm  MUSKULOSKELETAL: Adequately aligned spine. No joint erythema or tenderness. EXTREMITIE+ edema. Peripheral pulses intact. NEURO:Nonfocal      Labs:   CBC:  No results for input(s): WBC, RBC, HGB, HCT, MCV, MCH, MCHC, RDW, PLT, MPV in the last 72 hours. BMP:   Recent Labs     09/29/19  1159 09/30/19  0541 10/01/19  0629   * 134* 128*   K 4.6 3.8 3.8   CL 97* 99 97*   CO2 22 23 23   BUN 10 14 13   CREATININE 0.83 0.79 0.75   GLUCOSE 173* 95 93   CALCIUM 9.6 8.8 8.8      Phosphorus:  No results for input(s): PHOS in the last 72 hours. Magnesium:   No results for input(s): MG in the last 72 hours. Albumin:   No results for input(s): LABALBU in the last 72 hours. Radiology:  Reviewed as available. Assessment:    1-Hyponatremia, Hypoosmolar hyponatremia, Urine sodium was high 102 and urine osmolality is high 367 indicating high ADH Secretion consistent with SIADH likely contributing factors include use of SSRI Lexapro. SNa improving. 2.  Microscopic hematuria  UA positive for blood +5200 RBC 5-10 WBC large hemoglobin negative leukocyte Estrace, negative nitrite. 3. History of kidney stones    4. left Upper lobe pulmonary nodule 3x2.3 cm, extensive mediastinal lymphadenopathy, suspicious hepatic lesions metastasis? -S/P  percutaneous lung biopsy today    Plan:  Fluid restriction less than 1500 mL's per day  Strict I/Os  Start Nacl tablets 1 gm tid  Monitor serum sodium -recheck at 4 pm    Thank you for the consultation.       Electronically signed by Geovany Bruce MD on 10/1/2019 at 1:48 PM

## 2019-10-01 NOTE — PLAN OF CARE
Problem: Risk for Impaired Skin Integrity  Goal: Tissue integrity - skin and mucous membranes  Description  Structural intactness and normal physiological function of skin and  mucous membranes. 10/1/2019 0238 by Alie Kamara RN  Outcome: Ongoing  9/30/2019 1529 by Redd Gallardo RN  Outcome: Met This Shift  Note:   Patient has no skin breakdown noted. Patient turns self in bed appropriately. Problem: Daily Care:  Goal: Daily care needs are met  Description  Daily care needs are met  10/1/2019 0238 by Alie Kamara RN  Outcome: Ongoing  9/30/2019 1529 by Redd Gallardo RN  Outcome: Met This Shift  Note:   Patient is able to make needs known and functions independently. Problem: Mobility - Impaired:  Goal: Mobility will improve  Description  Mobility will improve  10/1/2019 0238 by Alie Kamara RN  Outcome: Ongoing  9/30/2019 1529 by Redd Gallardo RN  Outcome: Met This Shift     Problem: Falls - Risk of:  Goal: Will remain free from falls  Description  Will remain free from falls  10/1/2019 0238 by Alie Kamara RN  Outcome: Ongoing  9/30/2019 1529 by Redd Gallardo RN  Outcome: Met This Shift  Note:   Patient is up per self with steady gait noted. Patient is SOB with exertion. Pt assessed as a fall risk this shift. Remains free from falls and accidental injury at this time. Fall precautions in place, including falling star sign and fall risk band on pt. Floor free from obstacles, and bed is locked and in lowest position. Adequate lighting provided. Pt encouraged to call before getting OOB for any need. Bed alarm activated. Will continue to monitor needs during hourly rounding, and reinforce education on use of call light.     Goal: Absence of physical injury  Description  Absence of physical injury  10/1/2019 0238 by Alie Kamara RN  Outcome: Ongoing  9/30/2019 1529 by Redd Gallardo RN  Outcome: Met This Shift

## 2019-10-01 NOTE — PROGRESS NOTES
I placed pt who is breathing room air onto overnight pulse oximeter study monitor. No visible respiratory distress and patient's O2 saturation is %. Pt found on room air prior to the start of the overnight study. Pt aware to inform RN when they get up during the overnight study and pt confirmed understanding. RN informed study has started and to please document O2 saturation, time, his initials and reason for pt getting up during study on study document sheet on counter near the sink.

## 2019-10-01 NOTE — PROGRESS NOTES
Physical Therapy  Facility/Department: [facility] PROGRESSIVE CARE  Daily Treatment Note  NAME: Ron Torres  : 1955  MRN: [id number]    Date of Service: 10/1/2019    Discharge Recommendations:  Home with assist PRN   PT Equipment Recommendations  Equipment Needed: No    Assessment   Body structures, Functions, Activity limitations: Decreased functional mobility ; Decreased endurance;Decreased balance  Assessment: Impaired mobility due to decreased tolerance to activity and decreased balance  Decision Making: Low Complexity  History: COPD  PT Education: Energy Conservation;Gait Training  REQUIRES PT FOLLOW UP: Yes  Activity Tolerance  Activity Tolerance: Patient limited by endurance     Patient Diagnosis(es): The encounter diagnosis was Hyponatremia. has a past medical history of Atypical depressive disorder, Hypotension, unspecified, Mitral valve disorders(424.0), Obstructive chronic bronchitis with exacerbation ([address] Other and unspecified hyperlipidemia, Other malaise and fatigue, Temporomandibular joint disorders, unspecified, Unspecified breast disorder, and Unspecified vitamin D deficiency. has a past surgical history that includes sunny and bso (cervix removed) (). Restrictions  Restrictions/Precautions  Restrictions/Precautions: Fall Risk  Subjective   General  Chart Reviewed: Yes  Response To Previous Treatment: Patient with no complaints from previous session. Family / Caregiver Present: Yes(Sister, Brother in [place])  Subjective  Subjective: Patient pleasant and agreeable to physical therapy at this time. Jeancarlos Miles RN ok'd treatment. Patient reports feeling weak.    Pain Screening  Patient Currently in Pain: Denies  Vital Signs  Patient Currently in Pain: Denies       Orientation  Orientation  Overall Orientation Status: Within Functional Limits  Cognition      Objective   Bed mobility  Supine to Sit: Stand by assistance  Sit to Supine: Stand by assistance  Scooting: Stand by assistance  Transfers  Sit to Stand: Stand by assistance  Stand to sit: Stand by assistance  Ambulation  Ambulation?: Yes  Ambulation 1  Surface: level tile  Device: No Device  Assistance: Contact guard assistance  Quality of Gait: slightly unsteady, no LOB  Gait Deviations: Slow Kassie;Decreased step length;Decreased head and trunk rotation  Distance: 100 ft  Comments: SpO2 >95% with ambulation, HR 96 after ambulation. Stairs/Curb  Stairs?: No     Balance  Sitting - Static: Good  Sitting - Dynamic: Good  Standing - Static: Fair;+  Standing - Dynamic: Fair  Comments: Standing with no AD  Other exercises  Other exercises?: Yes  Other exercises 1: Bed mobility x2  Other exercises 2: Seated (B) LE exercises x15  Other exercises 3: Supine (B) LE Quad sets x15 (hold 3-5 seconds per rep)         Goals  Short term goals  Time Frame for Short term goals: 4-5 days  Short term goal 1: mod-I bed mobility  Short term goal 2: mod-I transfers  Short term goal 3: mod-I gait x 150-200ft  Short term goal 4: negotiate 4-5 steps with rail and SBA  Patient Goals   Patient goals : feel better    Plan    Plan  Times per week: 5-7x/wk  Times per day: Daily  Current Treatment Recommendations: Stair training, Gait Training, Balance Training, Endurance Training, Safety Education & Training  Safety Devices  Type of devices:  All fall risk precautions in place, Call light within reach, Gait belt, Left in bed, Nurse notified     Therapy Time   Individual Concurrent Group Co-treatment   Time In [phone]         Time Out 1535         Minutes [address] [place] [address]

## 2019-10-01 NOTE — CARE COORDINATION
ONGOING DISCHARGE PLAN:    Spoke with patient regarding discharge plan and patient confirms that plan is still to return to home w/ Sister, w/ VNS, Hector's. Pt. Had Nocturnal Oxygen study last night, results on front of Chart. Pulmonary on board, ? Home O2 eval today. Awaiting plans per Pulmonary. Remains on PO Prednisone. Per Nursing, Lung BX yesterday. Pt's Na today 128. Nephro following. Possible Transfer to Med C today. Will continue to follow for additional discharge needs.     Electronically signed by Jennyfer Mijares RN on 10/1/2019 at 12:42 PM

## 2019-10-01 NOTE — PROGRESS NOTES
[facility] Oncology Progress Note  10/1/2019 4:40 PM  Subjective:   Admit Date: 9/25/2019  PCP: John Lobo MD   CC: Lung Mass   Interval History: Patient in good spirits, denies any chest pain or sob, anxious abouot the bx results. Results of [address] shared with the patient. She verbalized understanding. She is interested in aggressive treatment     Diet: DIET GENERAL; Daily Fluid Restriction: 1500 ml  Medications:   Scheduled Meds:   predniSONE  20 mg Oral Daily    ipratropium  0.5 mg Nebulization 4x daily    levalbuterol  1.25 mg Nebulization 4x Daily    docusate sodium  100 mg Oral Daily    busPIRone  10 mg Oral TID    mometasone-formoterol  2 puff Inhalation BID    nicotine  1 patch Transdermal Daily    QUEtiapine  100 mg Oral Nightly    sodium chloride  1 g Oral TID WC    sodium chloride flush  10 mL Intravenous 2 times per day    enoxaparin  40 mg Subcutaneous Daily    famotidine  20 mg Oral BID    midodrine  10 mg Oral BID    therapeutic multivitamin-minerals  1 tablet Oral Daily    simvastatin  40 mg Oral Nightly     Continuous Infusions:  CBC:   No results for input(s): WBC, HGB, PLT in the last 72 hours. BMP:    Recent Labs     09/29/19  1159 09/30/19  0541 10/01/19  0629   * 134* 128*   K 4.6 3.8 3.8   CL 97* 99 97*   CO2 22 23 23   BUN 10 14 13   CREATININE 0.83 0.79 0.75   GLUCOSE 173* 95 93     Hepatic:   No results for input(s): AST, ALT, ALB, BILITOT, ALKPHOS in the last 72 hours. INR:   Recent Labs     09/29/19  0551   INR 1.2       Objective:   Vitals: BP (!) 142/79   Pulse 90   Temp 97.8 °F (36.6 °C) (Oral)   Resp 16   Ht 5' 1\" (1.549 m)   Wt 205 lb 4 oz (93.1 kg)   SpO2 98%   BMI 38.78 kg/m²   General appearance: alert and cooperative with exam  HEENT: Head: Normocephalic, no lesions, without obvious abnormality.   Neck: no adenopathy  Lungs:decrease bilaterally bases  Heart: regular rate and rhythm, S1, S2 normal, no murmur, click, rub or gallop  Abdomen:

## 2019-10-02 NOTE — PROGRESS NOTES
input(s): INR in the last 72 hours. PTT   Lab Results   Component Value Date    APTT 25.5 09/29/2019         RADIOLOGY     (See actual reports for details)    ASSESSMENT/PLAN     Patient Active Problem List   Diagnosis    COPD (chronic obstructive pulmonary disease) ([facility] [address]    Atypical depressive disorder    Mitral valve disorder    Other hyperlipidemia    Temporomandibular joint disorder    Vitamin D deficiency    Tobacco abuse    Precordial pain    Adverse drug effect    Idiopathic hypotension    Sweating abnormality    Mild cognitive disorder    Idiopathic hypotension    Contusion of left back wall of thorax    Pain at rest    Inability to ambulate due to knee    Fall with injury    Bipolar disorder in full remission ([address]    Acute cystitis without hematuria    Left elbow contusion    Mobility impaired    Tibial plateau fracture, left    Chronic pain of left knee    Obesity (BMI 35.0-39.9 without comorbidity)    Hyponatremia     Extensive small cell lung cancer  Very small left apical pneumothorax  Diagnosis of COPD  Chest x-ray  Supportive care  Chemotherapy plans per oncology. Will need nebulizer treatments upon discharge. Face-to-face discussion was done for her to continue with nebulizer treatments Xopenex and Atrovent for COPD. She voiced understanding. O2 evaluation ordered    Reviewed the chest x-ray, may be a very small apical pneumothorax on left.   Electronically signed by Roxanna Bartlett MD on 10/2/2019 at 1:56 PM

## 2019-10-02 NOTE — PLAN OF CARE
Problem: Risk for Impaired Skin Integrity  Goal: Tissue integrity - skin and mucous membranes  Description  Structural intactness and normal physiological function of skin and  mucous membranes. 10/2/2019 1512 by Raman Weiss RN  Outcome: Ongoing  Note:   Skin assessment performed. See head to toe assessment. Will continue to monitor. 10/2/2019 0448 by Fabiola Mace RN  Outcome: Ongoing     Problem: Daily Care:  Goal: Daily care needs are met  Description  Daily care needs are met  Outcome: Met This Shift  Note:   Patient and staff currently meeting all patient's daily care needs. Patient encouraged to participate and complete all ADLs per self. Will continue to monitor for opportunities for patient to meet all ADLs per self. Problem: Mobility - Impaired:  Goal: Mobility will improve  Description  Mobility will improve  10/2/2019 1512 by Raman Wesis RN  Outcome: Ongoing  10/2/2019 0448 by Fabiola Mace RN  Outcome: Ongoing     Problem: Falls - Risk of:  Goal: Will remain free from falls  Description  Will remain free from falls  10/2/2019 1512 by Raman Weiss RN  Outcome: Met This Shift  Note:   No falls noted this shift. Patient ambulates with x1 staff assistance without difficulty. Bed kept in low position. Safe environment maintained. Bedside table & call light in reach. Uses call light appropriately when needing assistance.     10/2/2019 0448 by Fabiola Mace RN  Outcome: Ongoing

## 2019-10-02 NOTE — CONSULTS
Tobacco Use    Smoking status: Current Every Day Smoker     Packs/day: 1.00     Years: 42.00     Pack years: 42.00     Types: Cigarettes    Smokeless tobacco: Never Used    Tobacco comment: patient want to quit as soon as possible   Substance Use Topics    Alcohol use: No     Alcohol/week: 0.0 standard drinks    Drug use: No       ALLERGIES  Allergies   Allergen Reactions    Vesicare [Solifenacin] Nausea And Vomiting         MEDICATIONS  Current Medications    predniSONE  20 mg Oral Daily    ipratropium  0.5 mg Nebulization 4x daily    levalbuterol  1.25 mg Nebulization 4x Daily    docusate sodium  100 mg Oral Daily    busPIRone  10 mg Oral TID    mometasone-formoterol  2 puff Inhalation BID    nicotine  1 patch Transdermal Daily    QUEtiapine  100 mg Oral Nightly    sodium chloride  1 g Oral TID WC    sodium chloride flush  10 mL Intravenous 2 times per day    enoxaparin  40 mg Subcutaneous Daily    famotidine  20 mg Oral BID    midodrine  10 mg Oral BID    therapeutic multivitamin-minerals  1 tablet Oral Daily    simvastatin  40 mg Oral Nightly     oxyCODONE-acetaminophen, sodium chloride flush, levalbuterol, sodium chloride flush, sodium chloride flush, potassium chloride **OR** potassium alternative oral replacement **OR** potassium chloride, sodium chloride flush, acetaminophen, LORazepam, ondansetron  IV Drips/Infusions    Home Medications  No current facility-administered medications on file prior to encounter.       Current Outpatient Medications on File Prior to Encounter   Medication Sig Dispense Refill    ondansetron (ZOFRAN ODT) 4 MG disintegrating tablet Take 1 tablet by mouth every 8 hours as needed for Nausea 12 tablet 0    SYMBICORT 160-4.5 MCG/ACT AERO INHALE 2 PUFFS INTO THE LUNGS TWICE DAILY 10.2 g 6    simvastatin (ZOCOR) 40 MG tablet TAKE 1 TABLET BY MOUTH EVERY NIGHT AT BEDTIME 90 tablet 3    Multiple Vitamins-Minerals (MULTIVITAMIN ADULT PO) Take 1 tablet by mouth daily       midodrine (PROAMATINE) 10 MG tablet TAKE 1 TABLET BY MOUTH TWICE DAILY 180 tablet 3    famotidine (PEPCID) 20 MG tablet TAKE 1 TABLET BY MOUTH TWICE DAILY 180 tablet 3    albuterol sulfate HFA (VENTOLIN HFA) 108 (90 Base) MCG/ACT inhaler Inhale 2 puffs into the lungs every 6 hours as needed for Wheezing 1 Inhaler 3    LORazepam (ATIVAN) 1 MG tablet Take 0.5 mg by mouth 2 times daily as needed for Anxiety. 1    escitalopram (LEXAPRO) 10 MG tablet Take 1 tablet by mouth daily Indications: (Pt takes a 20 mg + a 10 mg = 30 mg total per day) 30 tablet 3    escitalopram (LEXAPRO) 20 MG tablet Take 1 tablet by mouth daily Indications: (Pt takes a 20 mg + a 10 mg = 30 mg total per day) 30 tablet 3    QUEtiapine (SEROQUEL) 100 MG tablet Take 100 mg by mouth nightly          Data         BP (!) 141/72   Pulse 93   Temp 98.9 °F (37.2 °C) (Oral)   Resp 16   Ht 5' 1\" (1.549 m)   Wt 205 lb 4 oz (93.1 kg)   SpO2 96%   BMI 38.78 kg/m²     Wt Readings from Last 3 Encounters:   09/26/19 205 lb 4 oz (93.1 kg)   09/19/19 197 lb (89.4 kg)   07/19/19 192 lb (87.1 kg)        Code Status: Full Code     ADVANCED CARE PLANNING:  Patient has capacity for medical decisions: yes  Health Care Power of : no  Living Will: no     Personal, Social, and Family History  Marital Status: single  Living situation:with sister  Anabella/Spiritual History: she states that she is a believer  Psychological Distress: moderate  Does patient understand diagnosis/treatment? yes  Does family/caregiver understand diagnosis/treatment? yes      Assessment      Palliative Performance Scale:  ___60%  Ambulation reduced; Significant disease; Can't do hobbies/housework; intake normal or reduced; occasional assist; LOC full/confusion  ___50%  Mainly sit/lie;  Extensive disease; Can't do any work; Considerable assist; intake normal or reduced; LOC full/confusion  ___40%  Mainly in bed; Extensive disease; Mainly assist; intake normal or reduced; LOC full/confusion   ___30%  Bed Bound; Extensive disease; Total care; intake reduced; LOCfull/confusion  ___20%  Bed Bound; Extensive disease; Total care; intake minimal; Drowsy/coma  ___10%  Bed Bound; Extensive disease; Total care; Mouth care only; Drowsy/coma  ___0       Death      Plan      Palliative Interaction:Patient is in bed and alert, oriented and very pleasant. I introduce my palliative care role to her . The patient states\" oh I got the bad news today. \" She tells me that she has small cell lung cancer. She then tells me the story that her sister  about 5 months ago from small cell lung cancer , and her parents and another brother as well. She lives with another sister and they are very close , and her sister is very supportive. GOALS OF CARE:  We talked about her living her life out, and having quality of life, and she agreed. The patient did not talk specifically about any treatment that she would or would not want, but did say she wanted to enjoy everyday. I offered her much emotional support, and will continue to follow for goals of care and cancer support. Pulmonary then came to round. Will follow for goals of care and support. Education/support to patient  Discharge planning/helping to coordinate care  Providing support for coping/adaptation/distress of patient  Continue with current plan of care  Code status clarified: Full Code  Palliative care orders introduced  Will continue conversation about goals of care etc. tomorrow . Emotional support offered. Will follow for goals of care and cancer support.      Principle Problem/Diagnosis:  Hyponatremia [E87.1]    Goals of care evaluation   The patient goals of care are live longer   Goals of care discussed with:    [] Patient independently    [] Patient and Family    [] Family or Healthcare DPOA independently    [] Unable to discuss with patient, family/DPOA not present    Code Status  Full Code    Other recommendations  Please call with any palliative questions or concerns. Palliative Care Team is available via perfect serve or via phone. Palliative Care will continue to follow Ms. Post's care as needed. Thank you for allowing Palliative Care to participate in the care of Ms. Post .     Electronically signed by   Saint Chamber, RN  Palliative Care Team  on 10/2/2019 at 1:59 PM    Palliative care office: [phone]

## 2019-10-02 NOTE — FLOWSHEET NOTE
· Facts: Patient was recently added to palliative care after a dx of small cell lung cancer. · Feelings: Patient said her youngest sister recently  of same dx and there is other cancer in the extended family. She said she feels numb. Her KIRK was visiting and said there is just shock among the family. Patient said she cried and yelled and got angry and is worried about pain and how much she will suffer, but is also trying to \"get a \" and take it a day at a time. · Family: Patient has \"wonderful support\" with her family. She is the oldest child and the sister who  was the youngest. But she has other siblings and extended family. · Anabella: Writer left patient with a Scripture card and said brief prayer as RT came in for therapy. Follow-up: Writer did speak to patient briefly about advance directives and the need for HPOA. 10/02/19 1536   Encounter Summary   Services provided to: Patient and family together   Referral/Consult From:  [address] Family members   Continue Visiting   (10-2-19)   Complexity of Encounter Moderate   Length of Encounter 30 minutes   Spiritual Assessment Completed Yes   Advance Care Planning Yes   Routine   Type Follow up   Spiritual/Pentecostalism   Type Spiritual support   Grief and Life Adjustment   Type New Diagnosis; Palliative care   Assessment Approachable; Concerns with suffering; Shock   Intervention Active listening;Explored feelings, thoughts, concerns;Explored coping resources;Nurtured hope;Prayer;Sustaining presence/ Ministry of presence;Provided reading materials/devotional materials; Discussed illness/injury and it's impact; Discussed death   Outcome Expressed gratitude;Engaged in conversation;Expressed feelings/needs/concerns; Shared reminiscences; Receptive

## 2019-10-02 NOTE — PROGRESS NOTES
Nutrition Assessment    Type and Reason for Visit: Initial(Length of stay)    Nutrition Recommendations: Continue General diet, 1500 mL fluid restriction. Start Ensure High Protein BID. Nutrition Assessment: At time of visit patient received disappointing diagnosis and did not want to speak to writer. Patient is nutritionally compromised due to increased needs related to cancer with mets. Continue General diet, 1500 mL fluid restriction. Start Ensure High Protein twice a day. Monitor p.o intakes and labs. Will assess malnutrition status at follow up.      Malnutrition Assessment:  · Malnutrition Status: Insufficient data(patient did not want to speak at time of visit, new diagnosis)    Nutrition Risk Level: High    Nutrient Needs:  · Estimated Daily Total Kcal: 1985 kcal 209 [facility] with 1.4 factor     Nutrition Diagnosis:   · Problem: Increased nutrient needs  · Etiology: related to Catabolic illness(Cancer with mets)     Signs and symptoms:  as evidenced by Lab values    Objective Information:  · Nutrition-Focused Physical Findings: +1 LLE edema  · Current Nutrition Therapies:  · Oral Diet Orders: General, Fluid Restriction(1500 mL)   · Oral Diet intake: %, 51-75%  · Oral Nutrition Supplement (ONS) Orders: None  · Anthropometric Measures:  · Ht: 5' 1\" (154.9 cm)   · Current Body Wt: 205 lb (93 kg)  · Admission Body Wt: 205 lb (93 kg)  · Ideal Body Wt: 105 lb (47.6 kg), % Ideal Body 195%  · BMI Classification: BMI 35.0 - 39.9 Obese Class II    Nutrition Interventions:   Continue current diet, Start ONS  Continued Inpatient Monitoring    Nutrition Evaluation:   · Evaluation: Goals set   · Goals: PO intakes are greater than 75% of estimated needs    · Monitoring: Meal Intake, Weight, Pertinent Labs, Monitor Bowel Function, Supplement Intake, Diet Tolerance, I&O, Skin Integrity      Sharona Jackson  [facility], RLEONARDO, L.D,  Clinical Dietitian  Cell # [phone]  Office # [phone]

## 2019-10-02 NOTE — PLAN OF CARE
Nutrition Problem: Increased nutrient needs  Intervention: Food and/or Nutrient Delivery: Continue current diet, Start ONS  Nutritional Goals: PO intakes are greater than 75% of estimated needs\

## 2019-10-02 NOTE — PROGRESS NOTES
Writer placed call to [facility], spoke with nurse regarding about who the Radiation Oncologist would be. Nurse informed that Dr. Hussain Zee was aware of the consult and would be seeing the patient when he is finished in [facility] today.

## 2019-10-02 NOTE — PROGRESS NOTES
Physical Therapy  Facility/Department: [facility] MED SURG  Daily Treatment Note  NAME: Mark Marshall  : 1955  MRN: [id number]    Date of Service: 10/2/2019    Discharge Recommendations:  Home with assist PRN   PT Equipment Recommendations  Equipment Needed: No(Patient reports having RW at home. )    Assessment   Body structures, Functions, Activity limitations: Decreased functional mobility ; Decreased endurance;Decreased balance  Assessment: Impaired mobility due to decreased tolerance to activity and decreased balance  Decision Making: Low Complexity  Clinical Presentation: evolving  PT Education: Energy Conservation;Gait Training  REQUIRES PT FOLLOW UP: Yes  Activity Tolerance  Activity Tolerance: Patient limited by endurance; Patient limited by pain; Patient limited by fatigue     Patient Diagnosis(es): The encounter diagnosis was Hyponatremia. has a past medical history of Atypical depressive disorder, Hypotension, unspecified, Mitral valve disorders(424.0), Obstructive chronic bronchitis with exacerbation ([address] Other and unspecified hyperlipidemia, Other malaise and fatigue, Temporomandibular joint disorders, unspecified, Unspecified breast disorder, and Unspecified vitamin D deficiency. has a past surgical history that includes sunny and bso (cervix removed) (). Restrictions  Restrictions/Precautions  Restrictions/Precautions: Fall Risk  Subjective   General  Chart Reviewed: Yes  Response To Previous Treatment: Patient with no complaints from previous session. Family / Caregiver Present: Yes(Sister)  Subjective  Subjective: Patient with c/o LBP. She contributes new pain to laying on MRI board last night. General Comment  Comments: Per oncology note: \"58 year old female with significant smoking history admitted with SIADH, lung mass of left upper lobe, CT scan also showed liver mets. Biopsy showed small cell carcinoma. \"  Pain Screening  Patient Currently in Pain: Yes  Pain Assessment  Pain Assessment: 0-10  Pain Level: 8  Pain Location: Back  Pain Orientation: Lower;Right;Left  Pain Descriptors: Discomfort;Aching  Functional Pain Assessment: Prevents or interferes some active activities and ADLs  Non-Pharmaceutical Pain Intervention(s): Ambulation/Increased Activity;Repositioned  Response to Pain Intervention: None  Vital Signs  Patient Currently in Pain: Yes       Orientation  Orientation  Overall Orientation Status: Within Functional Limits  Cognition      Objective      Transfers  Sit to Stand: Minimal Assistance  Stand to sit: Contact guard assistance  Bed to Chair: Contact guard assistance  Comment: Patient required increased assist this date for sit to stand transfer. Ambulation  Ambulation?: Yes  Ambulation 1  Surface: level tile  Device: No Device  Assistance: Minimal assistance  Quality of Gait: increased unsteadiness this date, unsteady on turns, LOB x2 requiring Min A to correct. Gait Deviations: Slow Kassie;Decreased step length;Decreased head and trunk rotation  Distance: 80 ft  Comments: Patient educated on benefits of using RW for future ambulation d/t increased unsteadiness. Patient too fatigued to attempt second walk at this time. Patient has used RW previously after a knee sx. Stairs/Curb  Stairs?: Yes  Stairs  # Steps : 3  Stairs Height: 4\"  Rails: Bilateral  Assistance: Minimal assistance  Comment: Patient unsteady performing steps. Muscle fatigue noted. Balance  Posture: Fair  Sitting - Static: Good  Sitting - Dynamic: Good  Standing - Static: Fair  Standing - Dynamic: Fair;-  Comments: Standing with no AD  Other exercises  Other exercises?: No(Patient deferred exercises at this time d/t fatigue feeling \"wiped out from the walking and steps. \" )     Goals  Short term goals  Time Frame for Short term goals: 4-5 days- REVISED GOALS  Short term goal 1: pt to demonstrate mod-I bed mobility and supine <> sit transfers for position change  Short term goal 2: pt to demonstrate

## 2019-10-02 NOTE — PROGRESS NOTES
Physical Therapy  REVISED GOALS  [address] [facility]     Date: 10/2/19  Patient Name: Jaylin Moreno       Room: 0946/1825-36  MRN: [id number]  Account: [de-identified]   : 1955  (59 y.o.) Gender: female        10/02/19 1057   Short term goals   Time Frame for Short term goals 4-5 days- REVISED GOALS   Short term goal 1 pt to demonstrate mod-I bed mobility and supine <> sit transfers for position change   Short term goal 2 pt to demonstrate mod-I transfers sit <> stand and bed <> chair using least restrictive device   Short term goal 3 pt to demonstrate mod-I gait using least restricitive device  150-200ft for community distances   Short term goal 4 pt to demonstrate ability to ascend/ descend  4-5 steps with rail and SBA and least restricitve device if needed   Short term goal 5 pt to demonstrate good technique for LE strengthening and balance activities

## 2019-10-03 PROBLEM — C34.90 SMALL CELL LUNG CANCER (HCC): Status: ACTIVE | Noted: 2019-01-01

## 2019-10-03 NOTE — PROGRESS NOTES
[facility] Oncology Progress Note  10/2/2019 10:37 PM  Subjective:   Admit Date: 9/25/2019  PCP: Jazmine Chacon MD   CC: Lung Mass   Interval History: Patient is alert and oriented, she understands her diagnosis and staging. 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 Diet: DIET GENERAL; Daily Fluid Restriction: 1500 ml  Medications:   Scheduled Meds:   predniSONE  20 mg Oral Daily    ipratropium  0.5 mg Nebulization 4x daily    levalbuterol  1.25 mg Nebulization 4x Daily    docusate sodium  100 mg Oral Daily    busPIRone  10 mg Oral TID    mometasone-formoterol  2 puff Inhalation BID    nicotine  1 patch Transdermal Daily    QUEtiapine  100 mg Oral Nightly    sodium chloride  1 g Oral TID WC    sodium chloride flush  10 mL Intravenous 2 times per day    enoxaparin  40 mg Subcutaneous Daily    famotidine  20 mg Oral BID    midodrine  10 mg Oral BID    therapeutic multivitamin-minerals  1 tablet Oral Daily    simvastatin  40 mg Oral Nightly     Continuous Infusions:  CBC:   No results for input(s): WBC, HGB, PLT in the last 72 hours. BMP:    Recent Labs     09/30/19  0541 10/01/19  0629 10/01/19  1931 10/02/19  0659   * 128* 129* 130*   K 3.8 3.8  --  3.6*   CL 99 97*  --  94*   CO2 23 23  --  21   BUN 14 13  --  12   CREATININE 0.79 0.75  --  0.69   GLUCOSE 95 93  --  84     Hepatic:   No results for input(s): AST, ALT, ALB, BILITOT, ALKPHOS in the last 72 hours. INR:   No results for input(s): INR in the last 72 hours. Objective:   Vitals: BP (!) 145/71   Pulse 87   Temp 98 °F (36.7 °C) (Oral)   Resp 18   Ht 5' 1\" (1.549 m)   Wt 205 lb 4 oz (93.1 kg)   SpO2 94%   BMI 38.78 kg/m²   General appearance: alert and cooperative with exam  HEENT: Head: Normocephalic, no lesions, without obvious abnormality.   Neck: no adenopathy  Lungs:decrease bilaterally bases  Heart: regular rate and rhythm, S1, S2 normal, no murmur, click, rub or gallop  Abdomen: soft, non-tender; bowel sounds normal; no masses,  no organomegaly  Extremities: extremities normal, atraumatic, no cyanosis or edema  Neurologic: Mental status: Alert, oriented, thought content appropriate    Assessment and Plan:   59year old female with significant smoking history admitted with SIADH, lung mass of left upper lobe, CT scan also showed liver mets. Biopsy showed small cell carcinoma. Which is the expected finding inpatients presenting with SIADH. 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 The plan is to start chemotherapy as an outpatient  We also talked about incorporating immunotherapy. About 30 minutes were spent explaining the diagnosis and treatment plan with the family.       Marilou Cueva MD

## 2019-10-03 NOTE — CARE COORDINATION
Faxed nocturnal study and order for neb to [facility] waiting on face to face to be signed will follow for needs . Chidi Carbajal

## 2019-10-03 NOTE — PROGRESS NOTES
bowel sounds normal; no masses,  no organomegaly  Extremities: extremities normal, atraumatic, no cyanosis or edema  Neurologic: Mental status: Alert, oriented, thought content appropriate    Assessment and Plan:   59year old female with significant smoking history admitted with SIADH, lung mass of left upper lobe, CT scan also showed liver mets. Biopsy showed small cell carcinoma. The patient has diffuse bone metastases as well  Plan for palliative chemotherapy as an outpatient. Okay to discharge from my perspective. Appointment with  next Tuesday at 3 PM in [facility]. Plan for treatment with carboplatin and etoposide+immunotherapy . She will need a port prior to starting chemotherapy.       Todd Larsen MD

## 2019-10-03 NOTE — CARE COORDINATION
Nava Sandhu was evaluated today and a DME order was entered for a nebulizer compressor in order to administer Atrovent/Xopenex due to the diagnosis of COPD. The need for this equipment and treatment was discussed with the patient and she understands and is in agreement.         Electronically signed by Shabbir Carlton RN on 10/3/2019 at 12:26 PM

## 2019-10-03 NOTE — DISCHARGE INSTR - COC
Adverse drug effect T50.905A    Idiopathic hypotension I95.0    Sweating abnormality L74.9    Mild cognitive disorder F09    Idiopathic hypotension I95.0    Contusion of left back wall of thorax S20.222A    Pain at rest R52    Inability to ambulate due to knee R26.2    Fall with injury W19. Felicity Johne    Bipolar disorder in full remission ([facility] [address] F31.70    Acute cystitis without hematuria N30.00    Left elbow contusion S50. 02XA    Mobility impaired Z74.09    Tibial plateau fracture, left S82.142A    Chronic pain of left knee M25.562, G89.29    Obesity (BMI 35.0-39.9 without comorbidity) E66.9    Hyponatremia E87.1    Small cell lung cancer (HCC) C34.90       Isolation/Infection:   Isolation          No Isolation            Nurse Assessment:  Last Vital Signs: /81   Pulse 89   Temp 98.5 °F (36.9 °C) (Oral)   Resp 16   Ht 5' 1\" (1.549 m)   Wt 205 lb 4 oz (93.1 kg)   SpO2 94%   BMI 38.78 kg/m²     Last documented pain score (0-10 scale): Pain Level: 10  Last Weight:   Wt Readings from Last 1 Encounters:   09/26/19 205 lb 4 oz (93.1 kg)     Mental Status:  oriented and alert    IV Access:  - None    Nursing Mobility/ADLs:  Walking   Independent  Transfer  Independent  Bathing  Independent  Dressing  Independent  Toileting  Independent  Feeding  Independent  Med Admin  Independent  Med Delivery   none    Wound Care Documentation and Therapy:        Elimination:  Continence:   · Bowel: Yes  · Bladder: Yes  Urinary Catheter:    Colostomy/Ileostomy/Ileal Conduit:        Date of Last BM:   No intake or output data in the 24 hours ending 10/03/19 1546  No intake/output data recorded.     Safety Concerns:     None    Impairments/Disabilities:      None    Nutrition Therapy:  Current Nutrition Therapy:   - Oral Diet:  General    Routes of Feeding: Oral  Liquids: No Restrictions  Daily Fluid Restriction: no  Last Modified Barium Swallow with Video (Video Swallowing Test):     Treatments at the Time of Hospital Discharge:   Respiratory Treatments:   Oxygen Therapy:  Night oxygen at 2 liters  Ventilator:        Rehab Therapies: Physical Therapy and Occupational Therapy  Weight Bearing Status/Restrictions: Other Medical Equipment (for information only, NOT a DME order): Other Treatments: skilled nursing assessment  per protocol medication education      Patient's personal belongings (please select all that are sent with patient):  None    RN SIGNATURE:  Electronically signed by Almita Dubon RN on 10/3/19 at 3:53 PM    CASE MANAGEMENT/SOCIAL WORK SECTION    Inpatient Status Date: inpt 9/25/19    Readmission Risk Assessment Score:  Readmission Risk              Risk of Unplanned Readmission:        22           Discharging to Facility/ . [address] [address]  Phone [phone]  Fax  [phone]      Dialysis Facility (if applicable)   · Name:  · Address:  · Dialysis Schedule:  · Phone:  · Fax:    / signature: Electronically signed by Almita Dubon RN on 10/3/19 at 3:53 PM    PHYSICIAN SECTION    Prognosis: Good    Condition at Discharge: Stable    Rehab Potential (if transferring to Rehab): Good    Recommended Labs or Other Treatments After Discharge:     Physician Certification: I certify the above information and transfer of Katy Combs  is necessary for the continuing treatment of the diagnosis listed and that she requires Home Care for greater 30 days.      Update Admission H&P: No change in H&P    PHYSICIAN SIGNATURE:  Electronically signed by Dagmar Mejia MD on 10/3/19 at 3:47 PM

## 2019-10-03 NOTE — CONSULTS
[facility]            Radiation Oncology          [address] [facility]          [address]        Kourtney Camarillo: [phone]        F: [phone]       [employer]         10/3/2019    Patient: Cathren Aase   YOB: 1955       Dear Dr Rosario ref. provider found: Thank you for referring Cathren Aase to me for evaluation. Below are the relevant portions of my assessment and plan of care. If you have questions, please do not hesitate to call me. I look forward to following this patient along with you. Sincerely,      Electronically signed by Hector Beltran MD on 10/3/19 at 7:44 AM      CC: Patient Care Team:  Santiago Leiva MD as PCP - General (Internal Medicine)  Santiago Leiva MD as PCP - [facility] Provider  Timmy Loredo MD as Consulting Physician (Orthopedic Surgery)  Eduardo Nuñez MD as Consulting Physician (Urology)  Patrice Martin RN as Care Transition  Sherin Haas RN as Nurse Navigator (Oncology)  ------------------------------------------------------------------------------------------------------------------------------------------------------------------------------------------      RADIATION ONCOLOGY NEW PATIENT:     Date of Service: 2019    Location:  [address] [place] [address], [address] [place] [address], [address] [place] [address]    Patient ID:   Cathren Aase  : 1955   MRN: [id number]    Cathren Aase is being seen today for an oncologic opinion at the request of   No ref. provider found. CHIEF COMPLAINT: \"new lung cancer\"    DIAGNOSIS:  Cancer Staging  Extensive stage small cell lung carcinoma of the left upper lobe lung with liver and osseous metastases    HISTORY OF PRESENT ILLNESS:   59year old female presented to the ED with fatigue, nasuea/vomiting, and thought she may had a UTI. Lab work showed she was hyponatremic.  She had a course of antibiotics that did not help, and represented to the ED and was given a CXR that showed 20 mg, 20 mg, Oral, Daily, Phong Elizondo MD, 20 mg at 10/02/19 0843    ipratropium (ATROVENT) 0.02 % nebulizer solution 0.5 mg, 0.5 mg, Nebulization, 4x daily, AUSTEN Maravilla CNP, 0.5 mg at 10/03/19 0700    levalbuterol (XOPENEX) nebulizer solution 1.25 mg, 1.25 mg, Nebulization, Q4H PRN, AUSTEN Maravilla CNP, 1.25 mg at 09/29/19 1941    levalbuterol (Duwaine Loveless) nebulizer solution 1.25 mg, 1.25 mg, Nebulization, 4x Daily, AUSTEN Maravilla CNP, 1.25 mg at 10/03/19 0702    sodium chloride flush 0.9 % injection 10 mL, 10 mL, Intravenous, PRN, Daryn Santamaria MD, 10 mL at 09/28/19 1132    docusate sodium (COLACE) capsule 100 mg, 100 mg, Oral, Daily, Savage Wall MD, 100 mg at 10/02/19 0843    busPIRone (BUSPAR) tablet 10 mg, 10 mg, Oral, TID, AUSTEN Maravilla CNP, 10 mg at 10/02/19 2041    sodium chloride flush 0.9 % injection 10 mL, 10 mL, Intravenous, PRN, Savage Wall MD, 10 mL at 09/27/19 1145    mometasone-formoterol ([address]) 200-5 MCG/ACT inhaler 2 puff, 2 puff, Inhalation, BID, New Garcia MD, 2 puff at 10/02/19 2041    potassium chloride (KLOR-CON M) extended release tablet 40 mEq, 40 mEq, Oral, PRN, 40 mEq at 09/26/19 1021 **OR** potassium bicarb-citric acid (EFFER-K) effervescent tablet 40 mEq, 40 mEq, Oral, PRN **OR** potassium chloride 10 mEq/100 mL IVPB (Peripheral Line), 10 mEq, Intravenous, PRN, Elian Harris MD    nicotine (NICODERM CQ) 21 MG/24HR 1 patch, 1 patch, Transdermal, Daily, Savage Wall MD, 1 patch at 10/02/19 0845    QUEtiapine (SEROQUEL) tablet 100 mg, 100 mg, Oral, Nightly, Sanford Retana MD, 100 mg at 10/02/19 2041    sodium chloride tablet 1 g, 1 g, Oral, TID WC, Sanford Retana MD, 1 g at 10/02/19 1812    sodium chloride flush 0.9 % injection 10 mL, 10 mL, Intravenous, 2 times per day, New Garcia MD, 10 mL at 10/02/19 2043    sodium chloride flush 0.9 % injection 10 mL, 10 mL, Intravenous, PRN, Dre Talks on phone: None     Gets together: None     Attends Mandaen service: None     Active member of club or organization: None     Attends meetings of clubs or organizations: None     Relationship status: None    Intimate partner violence:     Fear of current or ex partner: None     Emotionally abused: None     Physically abused: None     Forced sexual activity: None   Other Topics Concern    None   Social History Narrative    None       FAMILY HISTORY:  Family History   Problem Relation Age of Onset    Cancer Mother     Asthma Father     Cancer Sister     Stroke Sister        REVIEW OF SYSTEMS:    GENERAL/CONSTITUTIONAL: (+)Fatigue. The patient denies fever, weakness, weight gain or weight loss. HEENT: The patient denies pain, redness, loss of vision, double or blurred vision. The patient denies ringing in the ears, loss of hearing, hoarseness or constant feeling of a need to clear the throat when nothing is there, food sticking in throat when swallows or painful swallowing. CARDIOVASCULAR: The patient denies chest pain, irregular heartbeats, sudden changes in heartbeat or palpitation, shortness of breath. RESPIRATORY: The patient denies shortness of breath, cough, hemoptysis. GASTROINTESTINAL: (+) Nausea. The patient denies vomiting, diarrhea, constipation, blood in the stools. GENITOURINARY: The patient denies difficult urination, pain or burning with urination, blood in the urine. MUSCULOSKELETAL: The patient denies arm, buttock, thigh or calf cramps. No joint or muscle pain. SKIN: The patient denies easy bruising, skin redness, skin rash, hives. NEUROLOGIC: The patient denies headache, dizziness, fainting, muscle spasm, loss of consciousness. ENDOCRINE: The patient denies intolerance to hot or cold temperature, flushing. HEMATOLOGIC/LYMPHATIC: The patient denies anemia, bleeding tendency or clotting tendency.   ALLERGIC/IMMUNOLOGIC: The patient denies rhinitis, asthma, skin sensitivity. PHYSICAL EXAMINATION:    CHAPERONE: Not Required    ECO Asymptomatic    VITAL SIGNS: /69   Pulse 84   Temp 98.6 °F (37 °C) (Oral)   Resp 16   Ht 5' 1\" (1.549 m)   Wt 205 lb 4 oz (93.1 kg)   SpO2 91%   BMI 38.78 kg/m²   GENERAL:  General appearance is that of a well-nourished, well-developed in no apparent distress. NECK:  No adenopathy or a palpable thyroid mass, trachea is midline. LYMPHATICS: No cervical, supraclavicular, or axillary adenopathy. HEART:  Regular rate and rhythm, S1, S2, no murmurs. LUNGS:  Clear to auscultation bilaterally with no wheezing or crackles. ABDOMEN:  Soft, nontender, non distended, and no hepatosplenomegaly. EXTREMITIES:  No clubbing, cyanosis, or edema. No calf tenderness. MSK:  No CVA or spinal tenderness. NEUROLOGICAL: No focal deficits. SKIN: No erythema or desquamation.       LABS:  WBC   Date Value Ref Range Status   2019 8.4 3.5 - 11.0 k/uL Final     Segs Absolute   Date Value Ref Range Status   2019 6.60 1.3 - 9.1 k/uL Final     Hemoglobin   Date Value Ref Range Status   2019 12.2 12.0 - 16.0 g/dL Final     Platelet Count   Date Value Ref Range Status   2012 247 140 - 450 k/uL Final     Platelets   Date Value Ref Range Status   2019 215 150 - 450 k/uL Final     Lab Results   Component Value Date     (L) 10/03/2019    K 3.6 (L) 10/03/2019    CL 98 10/03/2019    CO2 21 10/03/2019    BUN 17 10/03/2019    CREATININE 0.77 10/03/2019    GLUCOSE 99 10/03/2019    CALCIUM 8.9 10/03/2019    PROT 6.7 2019    LABALBU 3.7 2019    BILITOT 0.47 2019    ALKPHOS 249 (H) 2019    AST 54 (H) 2019    ALT 54 (H) 2019    LABGLOM >60 10/03/2019    GFRAA >60 10/03/2019    GLOB NOT REPORTED 2013           IMAGING:   MRI Brain 10/1/19  Impression   Scattered calvarial and upper cervical spine osseous lesions suggestive of   diffuse osseous metastases.       Otherwise no evidence of

## 2019-10-03 NOTE — PROGRESS NOTES
muscular spasm  MUSKULOSKELETAL: Adequately aligned spine. No joint erythema or tenderness. EXTREMITIE+ edema. Peripheral pulses intact. NEURO:Nonfocal      Labs:   CBC:  Recent Labs     10/03/19  0607         BMP:   Recent Labs     10/01/19  0629 10/01/19  1931 10/02/19  0659 10/03/19  0607   * 129* 130* 131*   K 3.8  --  3.6* 3.6*   CL 97*  --  94* 98   CO2 23  --  21 21   BUN 13  --  12 17   CREATININE 0.75  --  0.69 0.77   GLUCOSE 93  --  84 99   CALCIUM 8.8  --  8.9 8.9        Assessment/plan:    1. Hyponatremia - hypoosmolar hyponatremia. Continue sodium chloride tablet 1 g p.o. 3 times daily. 2.  Microscopic hematuria - UA positive for blood +5200 RBC 5-10 WBC large hemoglobin negative leukocyte Estrace, negative nitrite. 3. History of kidney stones -asymptomatic. 4. Left upper lobe pulmonary nodule 3x2.3 cm, extensive mediastinal lymphadenopathy, suspicious hepatic lesions metastasis? -S/P  percutaneous lung biopsy  MRI Brain -- diffuse calvarial osseous lesions, no intracranial metastatic disease    Okay to discharge from renal standpoint. Patient to follow-up with my office in 4 weeks.       Electronically signed by Elizabeth Sullivna MD on 10/3/2019 at 2:45 PM

## 2019-10-03 NOTE — PROGRESS NOTES
Mobility impaired    Tibial plateau fracture, left    Chronic pain of left knee    Obesity (BMI 35.0-39.9 without comorbidity)    Hyponatremia       PLAN:  Hyponatremia SIADH   Small cell lung ca bone brain mets   Start percocet for pain control   F/u oncology pulm       MD DEMETRIUS [address], [address] [place] [address].    Phone [phone]   Fax: [phone]  Answering Service: [phone]

## 2019-10-03 NOTE — PROGRESS NOTES
Physical Therapy  Facility/Department: [facility] MED SURG  Daily Treatment Note  NAME: Jarvis Shields  : 1955  MRN: [id number]    Date of Service: 10/3/2019    Discharge Recommendations:  Home with assist PRN   PT Equipment Recommendations  Equipment Needed: No(Patient has rollator at home.)    Assessment   Body structures, Functions, Activity limitations: Decreased functional mobility ; Decreased endurance;Decreased balance  Assessment: Impaired mobility due to decreased tolerance to activity and decreased balance  Specific instructions for Next Treatment: Patient has sister's old rollator at home for her use, attempt ambulation with rollator and educate on safety. History: COPD  Clinical Presentation: evolving  PT Education: Energy Conservation;Gait Training;Home Exercise Program  REQUIRES PT FOLLOW UP: Yes  Activity Tolerance  Activity Tolerance: Patient limited by endurance  Activity Tolerance: Sitting EOB, patient became increasingly shakey and began to sweat. Patient was at rest when this began. Patient instructed to lay down, RN Concetta Grissom notified. Patient Diagnosis(es): The encounter diagnosis was Hyponatremia. has a past medical history of Atypical depressive disorder, Hypotension, unspecified, Mitral valve disorders(424.0), Obstructive chronic bronchitis with exacerbation ([address] Other and unspecified hyperlipidemia, Other malaise and fatigue, Temporomandibular joint disorders, unspecified, Unspecified breast disorder, and Unspecified vitamin D deficiency. has a past surgical history that includes sunny and bso (cervix removed) (). Restrictions  Restrictions/Precautions  Restrictions/Precautions: Fall Risk  Subjective   General  Chart Reviewed: Yes  Additional Pertinent Hx: Per Dr. Ivett Chowdhury, 10/2/19 internal medicine note: Small cell lung CA, bone and brain mets   Response To Previous Treatment: Patient with no complaints from previous session.   Family / Caregiver Present:

## 2019-10-03 NOTE — CARE COORDINATION
[facility] Encounter Date/Time: 2019 [address] Account: [de-identified]    MRN: [id number]    Patient: Indy Khan    Contact Serial #: [id number]      ENCOUNTER          Patient Class: I Private Enc? No Unit RM BD: STCZ MED KALI    Hospital Service: Med/Surg   ADM DX: Hyponatremia [E87.1]   ADM Provider: Melyssa Pulido MD   Procedure:     ATT Provider: Melyssa Pulido MD   REF Provider:        PATIENT                 Name: Indy Khan : 1955 (64 yrs)   Address: [address] [place] [address] [address] Sex: Female   City: [place] [address]         Marital Status: Single   Employer: DISABLED         Christianity: [facility]   Primary Care Provider: Jazmine Chacon MD         Primary Phone: [phone]   EMERGENCY CONTACT   Contact Name Legal Guardian? Relationship to Patient Home Phone Work Phone   1. Marlin Schroeder  2. *No Contact Specified*      Brother/Sister    [phone]            GUARANTOR            Guarantor: Indy Khan     : 1955   Address: [address] [place] [address] [address] Sex: Female     [place] [address]     Relation to Patient: Self       Home Phone: [phone]   Guarantor ID: [id number]       Work Phone:     Guarantor Employer: DISABLED         Status: DISABLED      COVERAGE        PRIMARY INSURANCE   Payor: Juan Carlos Barillas [place] Plan: [facility] DUAL   Payor Address: [address]       Group Number:   Insurance Type: INDEMNITY   Subscriber Name: Yanet Thomas : 1955   Subscriber ID: [id number] Pat. Rel. to Sub: Self   SECONDARY INSURANCE   Payor:   Plan:     Payor Address:  ,           Group Number:   Insurance Type:     Subscriber Name:   Subscriber :     Subscriber ID:   Pat.  Rel. to Sub:                 [id number]         CSN                                    Req/Control # [Problem retrieving Specimen ID]                                   Order Date:  Oct 3, 2019  [id number]                                        Patient Information      Name:  Mauricio Antunez  :  1955  Age:  59 y.o. Address:  [address] [place] [address], [address]   Zip:  [address]  PCP: Rasheed Cuadra MD Sex:  F  SSN: [id number]  Home Phone: [phone]  Work Phone:    Patient MRN:  [id number]    Alt Patient ID:  [id number]  PCP Phone: [phone]       Authorizing Provider Information       AUTHORIZING PROVIDER: Rustam Boggs MD  Physician ID: [id number]  NPI:  [id number]  Site:   Address: [address]: [facility], [facility]  Phone: [phone]  Fax:               EQUIPMENT ORDERED  DME Order for Home Oxygen as OP [OES313] (ORD   #:   [id number]) Priority  Routine Class  Hospital Performed        Associated Diagnosis:          Comments:    You must complete the order parameters below and add the medical necessity documentation for this DME in a separate note.     Stationary Oxygen Concentrator at 2 lpm via Nasal Cannula     Stationary Prescribed at:  Continuous while sleeping           Non Applicable     Diagnosis: copd  Length of need: Lifetime            Scheduling Instructions:                                 Specimen Source             Collection Date    Collection Time    Order Status    Expected Date                Electronically Signed By  Rustam Boggs MD Date  Oct 3, 2019           Responsible Party Yg Kohler     Gordo-ActID   Relationship Account Type Home Phone   [facility] - [id number]* [address] Self P/F [phone]   Employer   Work Phone   DISABLED              [address]  Insurance/Subscriber ID:  [id number]  Subscriber Name:  [facility]              Relationship to Patient: SelfSigned ABN: N    Payor Name:  Dave Orozco [place]   Plan:  [facility]   Group:   Worker's Comp Date of Injury:            Rustam Boggs MD   Physician   Pulmonology   Progress Notes   Addendum   Date of Service:  10/3/2019  2:53 PM               Expand All Collapse All      Show:Clear all  [x]Manual[x]Template[]Copied    Added by:  Harish Smith MD    []Maximino for details  Pulmonary Progress Note  [facility]        Patient - Kayla Smith,  Age - 59 y.o.    - 1955      Room Number - 4729/2908-03   N -  [id number]   Acct # - [de-identified]  Date of Admission -  2019  6:05 PM     Consulting Ebony Lerner MD  Primary Care Physician - Ninoska Mondragon MD      SUBJECTIVE   Patient appears to be in fair spirits. She appears to be able to go home.     OBJECTIVE   VITALS    height is 5' 1\" (1.549 m) and weight is 205 lb 4 oz (93.1 kg). Her oral temperature is 98.5 °F (36.9 °C). Her blood pressure is 113/81 and her pulse is 89. Her respiration is 16 and oxygen saturation is 94%. Body mass index is 38.78 kg/m². Temperature Range: Temp: 98.5 °F (36.9 °C) Temp  Av.4 °F (36.9 °C)  Min: 98 °F (36.7 °C)  Max: 98.6 °F (37 °C)  BP Range:  Systolic (37RLY), IPU:502 , Min:113 , QDV:695     Diastolic (59SAJ), XEP:69, Min:69, Max:81     Pulse Range: Pulse  Av.7  Min: 84  Max: 89  Respiration Range: Resp  Av.5  Min: 16  Max: 20  Current Pulse Ox[de-identified]  SpO2: 94 %  24HR Pulse Ox Range:  SpO2  Av.3 %  Min: 91 %  Max: 95 %  Oxygen Amount and Delivery: O2 Flow Rate (L/min): 2.5 L/min         Wt Readings from Last 3 Encounters:   19 205 lb 4 oz (93.1 kg)   19 197 lb (89.4 kg)   19 192 lb (87.1 kg)         I/O (24 Hours)  No intake or output data in the 24 hours ending 10/03/19 1453     EXAM      General Appearance  Awake, alert, oriented, in no acute distress  HEENT - normocephalic, atraumatic. Neck - Supple,  trachea midline   Lungs -clear no crackles rales or wheezes  Heart Exam:PMI normal. No lifts, heaves, or thrills. RRR. No murmurs, clicks, gallops, or rubs  Abdomen Exam: Abdomen soft, non-tender.  BS normal. Extremity Exam: No edema     MEDS      Scheduled Medications    predniSONE  20 mg Oral Daily    ipratropium  0.5 mg Nebulization 4x daily    levalbuterol  1.25 mg Nebulization 4x Daily    docusate sodium  100 mg Oral Daily    busPIRone  10 mg Oral TID    mometasone-formoterol  2 puff Inhalation BID    nicotine  1 patch Transdermal Daily    QUEtiapine  100 mg Oral Nightly    sodium chloride  1 g Oral TID WC    sodium chloride flush  10 mL Intravenous 2 times per day    enoxaparin  40 mg Subcutaneous Daily    famotidine  20 mg Oral BID    midodrine  10 mg Oral BID    therapeutic multivitamin-minerals  1 tablet Oral Daily    simvastatin  40 mg Oral Nightly               PRN Medications   oxyCODONE-acetaminophen, sodium chloride flush, levalbuterol, sodium chloride flush, sodium chloride flush, potassium chloride **OR** potassium alternative oral replacement **OR** potassium chloride, sodium chloride flush, acetaminophen, LORazepam, ondansetron        LABS   CBC       Recent Labs     10/03/19  0607         BMP:         Lab Results   Component Value Date      10/03/2019     K 3.6 10/03/2019     CL 98 10/03/2019     CO2 21 10/03/2019     BUN 17 10/03/2019     LABALBU 3.7 09/25/2019     LABALBU 4.3 05/17/2012     CREATININE 0.77 10/03/2019     CALCIUM 8.9 10/03/2019     GFRAA >60 10/03/2019     LABGLOM >60 10/03/2019      ABGs:No results found for: PHART, PO2ART, ZVJ1KBF No results found for: IFIO2, MODE, SETTIDVOL, SETPEEP  Ionized Calcium:  No results found for: IONCA  Magnesium:          Lab Results   Component Value Date     MG 1.8 09/25/2019      Phosphorus:  No results found for: PHOS      LIVER PROFILE No results for input(s): AST, ALT, LIPASE, BILIDIR, BILITOT, ALKPHOS in the last 72 hours.     Invalid input(s): AMYLASE,  ALB  INR No results for input(s): INR in the last 72 hours.   PTT         Lab Results   Component Value Date     APTT 25.5 09/29/2019            RADIOLOGY      (See

## 2019-10-03 NOTE — CARE COORDINATION
[place]  3pm  [address] [facility].   [phone]           Follow up with Sandrita Lanza MD in 1 week(s)   Specialty: Pulmonology, Sleep Medicine   If symptoms worsen/ get chest x-ray prior to seeing [facility]   [address], [address] [facility]   [phone]           Follow up with Bethene Hammans, MD in 4 week(s)   Specialty: Nephrology   If symptoms worsen  [address]   [facility] [address] [facility] [id number]   [address] [facility] Follow Up with Mary Vitale MD   Friday Oct 4, 2019 2:45 PM   Please arrive 15 minutes prior to appointment, bring insurance card and photo ID. [address]   [phone]           Go to Mary Vitale MD   Friday Oct 4, 2019   Specialty: Internal Medicine   [facility] Follow up, Appointment Time 2:45p.m. If unable to keep appt,please call to [facility]  [address] [place]   [phone]    Oct8 Office Visit with Mounika Lozada MD   Tuesday Oct 8, 2019 3:00 PM   Please arrive 15 minutes prior to appointment, bring photo ID and insurance card. [address] [address]   [phone]    [address] Office Visit with Mary Vitale MD   Monday Oct 21, 2019 3:45 PM   Please arrive 15 minutes prior to appointment, bring photo ID and insurance card.   [address]   [phone]    You are allergic to the following     You are allergic to the following   Allergen Reactions   Vesicare (Solifenacin) Nausea And Vomiting   Immunizations Administered for This Admission     Name Date   Influenza, Quadv, IM, PF (6 mo and older Fluzone, Flulaval, Fluarix, and 3 yrs and older Afluria) 9/29/2019   Your Latest Vitals        Blood Pressure 113/81      BMI 38.78      Weight 205 lb 4 oz      Height 5' 1\"      Temperature (Oral) 98.5 °F      Pulse 89      Respiration 16      Oxygen Saturation 94%      BSA 2 m²   Daily Medication List (This medication list can be shared with any healthcare provider who is helping you manage your medications)     There are NEW medications for you. START taking them after you leave the hospital     There are NEW medications for you. START taking them after you leave the hospital    Next Dose Due AM NOON PM NIGHT   busPIRone 10 MG tablet   Commonly known as: BUSPAR   Take 1 tablet by mouth 3 times daily         ipratropium 0.02 % nebulizer solution   Commonly known as: ATROVENT   Take 2.5 mLs by nebulization 4 times daily         levalbuterol 1.25 MG/0.5ML nebulizer solution   Commonly known as: XOPENEX   Take 0.5 mLs by nebulization 3 times daily         mometasone-formoterol 200-5 MCG/ACT inhaler   Commonly known as: DULERA   Inhale 2 puffs into the lungs 2 times daily   Replaces: SYMBICORT 160-4.5 MCG/ACT Aero         nicotine 21 MG/24HR   Commonly known as: NICODERM CQ   Place 1 patch onto the skin daily   Start taking on: 10/4/2019         oxyCODONE-acetaminophen 5-325 MG per tablet   Commonly known as: PERCOCET   Take 1 tablet by mouth every 6 hours as needed for Pain for up to 5 days.          predniSONE 20 MG tablet   Commonly known as: DELTASONE   Take 1 tablet by mouth daily for 5 days   Start taking on: 10/4/2019         sodium chloride 1 g tablet   Take 1 tablet by mouth 3 times daily (with meals)         These are medications you told us you were taking at home, CONTINUE taking them after you leave the hospital     These are medications you told us you were taking at home, CONTINUE taking them after you leave the hospital    Next Dose Due AM NOON PM NIGHT   albuterol sulfate  (90 Base) MCG/ACT inhaler   Inhale 2 puffs into the lungs every 6 hours as needed for Wheezing  As needed        famotidine 20 MG tablet   Commonly known as: PEPCID   TAKE 1 TABLET BY MOUTH TWICE DAILY         LORazepam 1 MG tablet   Commonly known as: ATIVAN Take 0.5 mg by mouth 2 times daily as needed for Anxiety. As needed        midodrine 10 MG tablet   Commonly known as: PROAMATINE   TAKE 1 TABLET BY MOUTH TWICE DAILY         MULTIVITAMIN ADULT PO   Take 1 tablet by mouth daily         ondansetron 4 MG disintegrating tablet   Commonly known as: ZOFRAN-ODT   Take 1 tablet by mouth every 8 hours as needed for Nausea  As needed        QUEtiapine 100 MG tablet   Commonly known as: SEROQUEL   Take 100 mg by mouth nightly         simvastatin 40 MG tablet   Commonly known as: ZOCOR   TAKE 1 TABLET BY MOUTH EVERY NIGHT AT BEDTIME         These are the medications you have told us you were taking at home STOP taking them after you leave the hospital     These are the medications you have told us you were taking at home STOP taking them after you leave the hospital   albuterol-ipratropium  MCG/ACT Aers inhaler   Commonly known as: COMBIVENT RESPIMAT    cephALEXin 500 MG capsule   Commonly known as: KEFLEX    escitalopram 10 MG tablet   Commonly known as: LEXAPRO    escitalopram 20 MG tablet   Commonly known as: LEXAPRO    SYMBICORT 160-4.5 MCG/ACT Aero   Generic drug: budesonide-formoterol   Replaced by: mometasone-formoterol 200-5 MCG/ACT inhaler           Where to  your medications         these medications at [address] [place] [address] [phone]  F [phone]      busPIRone  nicotine  oxyCODONE-acetaminophen  predniSONE  sodium chloride   Address: [address] [place], [place] [id number]   Phone: [phone]       these medications from any pharmacy with your printed prescription      ipratropium  levalbuterol  mometasone-formoterol   Important information for a smoker        THE MOST IMPORTANT ACTION YOU CAN TAKE TO IMPROVE YOUR CURRENT AND FUTURE HEALTH IS TO QUIT SMOKING.     Call the Reginald APERA [employer]AG[employer] Smoking Support Hot Line at [facility] ([phone])     Smoking harms nonsmokers.  When

## 2019-10-03 NOTE — DISCHARGE SUMMARY
lungs every 6 hours as needed for Wheezing             busPIRone (BUSPAR) 10 MG tablet  Take 1 tablet by mouth 3 times daily             famotidine (PEPCID) 20 MG tablet  TAKE 1 TABLET BY MOUTH TWICE DAILY             ipratropium (ATROVENT) 0.02 % nebulizer solution  Take 2.5 mLs by nebulization 4 times daily             levalbuterol (XOPENEX) 1.25 MG/0.5ML nebulizer solution  Take 0.5 mLs by nebulization 3 times daily             LORazepam (ATIVAN) 1 MG tablet  Take 0.5 mg by mouth 2 times daily as needed for Anxiety. midodrine (PROAMATINE) 10 MG tablet  TAKE 1 TABLET BY MOUTH TWICE DAILY             mometasone-formoterol (DULERA) 200-5 MCG/ACT inhaler  Inhale 2 puffs into the lungs 2 times daily             Multiple Vitamins-Minerals (MULTIVITAMIN ADULT PO)  Take 1 tablet by mouth daily              nicotine (NICODERM CQ) 21 MG/24HR  Place 1 patch onto the skin daily             ondansetron (ZOFRAN ODT) 4 MG disintegrating tablet  Take 1 tablet by mouth every 8 hours as needed for Nausea             oxyCODONE-acetaminophen (PERCOCET) 5-325 MG per tablet  Take 1 tablet by mouth every 6 hours as needed for Pain for up to 5 days. predniSONE (DELTASONE) 20 MG tablet  Take 1 tablet by mouth daily for 5 days             QUEtiapine (SEROQUEL) 100 MG tablet  Take 100 mg by mouth nightly              simvastatin (ZOCOR) 40 MG tablet  TAKE 1 TABLET BY MOUTH EVERY NIGHT AT BEDTIME             sodium chloride 1 g tablet  Take 1 tablet by mouth 3 times daily (with meals)                 Discharge Instructions: Follow up with Nakul Garcia MD in 2 weeks.       Hospital acquired Infections: None    Time spent for dc more than 30 min       Deepak MAYER attending

## 2019-10-18 PROBLEM — J18.9 PNEUMONIA: Status: ACTIVE | Noted: 2019-01-01

## 2019-10-18 NOTE — CARE COORDINATION
CASE MANAGEMENT NOTE:    Admission Date:  10/17/2019 Kera Moyer is a 59 y.o.  female    Admitted for : Small cell lung cancer ([facility] [C34.90]    Met with:  Patient    PCP:  Dr Carmelita Nobles:  Sol Dhillon:  independently at home             Current Services PTA:  Yes, current with [facility] - [address]  Is patient agreeable to [facility]: Yes     Freedom of choice provided: NA    List of [address] provided: NA    [facility] chosen:  Yes    DME:  walker    Home Oxygen: Yes    Nebulizer: Yes    CPAP/BIPAP: No    Supplier: [facility]    Potential Assistance Needed: No    SNF needed: possibly, If PT recommends it, then she wants [address]:  [place]       Is the Patient an Quanterix with Readmission Risk Score greater than 14%? Yes  If yes, pt needs a follow up appointment made within 7 days. Does Patient want to use MEDS to BEDS? No    Family Members/Caregivers that pt would like involved in their care:    Yes    If yes, list name here:  Sister Leonor Hammond    Transportation Provider:  Family             Is patient in Isolation/One on One/Altered Mental Status? No  If yes, skip next question. If no, would they like an I-Pad to  use? No  If yes, call [phone]. Discharge Plan:  10/18/19 - Ewa Chaney - Patient is form home and is current with [facility] - [facility] home care, DME's include wheeled walker, Home oxygen with concentrator, portable tanks and nebulizer. Will see what PT is recommending, If they rec ECF, she wants Kylah GRIFFINW to follow if ECF is needed, orange header pt risk 22% STACY needs signed and completed. Will continue to follow for needs.  //pf                Electronically signed by: Jessenia Chua RN on 10/18/2019 at 9:47 AM

## 2019-10-18 NOTE — CARE COORDINATION
[address] Transitions Interview     10/18/2019    Patient: Michele Boland Patient : 1955   MRN: [id number]  Reason for Admission: Small cell lung cancer ([facility] [C34.90]  RARS: Readmission Risk Score: 23         Spoke with: Michele Boland    Met with Layla Branch at bedside. She is familiar with care transitions and had just been in care transitions. Pt was admitted to hospital from PCP office. Discharge plan is pending. Spoke with Seamus Crooks who met with pt at her PCP appointment. She stated that Dr Cristiane Snow spoke with pt and sister about hospice. She stated pt seemed confused, seeming to want hospice and then saying she wanted to do chemo. YANE stated that sister voice her opinion on wanting Layla Mayosaturnino to try to fight the cancer. Met with Ranjit AGUIRRE RN. Explained pt's condition and about PCP appointment and hospice being mentioned. She stated she would speak to physician about hospice or at least a  palliative care consult.       Readmission Risk  Patient Active Problem List   Diagnosis    COPD (chronic obstructive pulmonary disease) ([facility]    Atypical depressive disorder    Mitral valve disorder    Other hyperlipidemia    Temporomandibular joint disorder    Vitamin D deficiency    Tobacco abuse    Precordial pain    Adverse drug effect    Idiopathic hypotension    Sweating abnormality    Mild cognitive disorder    Idiopathic hypotension    Contusion of left back wall of thorax    Pain at rest    Inability to ambulate due to knee    Fall with injury    [facility]    Acute cystitis without hematuria    Left elbow contusion    Mobility impaired    Tibial plateau fracture, left    Chronic pain of left knee    Obesity (BMI 35.0-39.9 without comorbidity)    Hyponatremia    Small cell lung cancer [facility])       Inpatient Assessment  Care Transitions Summary    Care Transitions Inpatient Review  Medication Review  Do you have all of your prescriptions and are they filled?:  No   How often do you have difficulty taking your medications?:  I always take them as prescribed. Housing Review  Who do you live with?:  Roommate/Housemate  Are you an active caregiver in your home?:  No  Does the person that you care for see a [address] PCP?:  No  Social Support  Durable Medical Equipment  Patient DME:  Ebonyrenu Aj, Straight cane  Functional Review  Ability to seek help/take action for Emergent/Urgent situations i.e. fire, crime, inclement weather or health crisis. :  Independent  Ability handle personal hygiene needs (bathing/dressing/grooming): Independent  Ability to manage medications: Independent  Ability to prepare food:  Independent  Ability to maintain home (clean home, laundry): Independent  Ability to drive and/or has transportation:  Dependent  Ability to do shopping:  Independent  Ability to manage finances: Independent  Is patient able to live independently?:  Yes  Hearing and Vision  Care Transitions Interventions         Follow Up  Future Appointments   Date Time Provider Caitlyn Chan   10/22/2019 11:00 AM SCHEDULE, [facility]   10/23/2019  2:30 PM Ryan Romero MD [facility]   10/29/2019  8:40 AM Tate Streeter MD [address] [place] [address]   10/29/2019  9:00 [facility]   10/30/2019  9:00 [facility]   10/31/2019  2:00 PM STV [facility] MED ONC CHAIR 13 [facility]   11/14/2019  1:00 PM Narcisa ALVAREZ [facility]   11/15/2019  3:30 PM Ryan Romero MD [address] Maintenance  There are no preventive care reminders to display for this patient.     Tara Jonas RN

## 2019-10-18 NOTE — FLOWSHEET NOTE
10/17/19 1727   Provider Notification   Reason for Communication Review case   Provider Name Dr. Jose Miguel Fleming   Provider Notification Physician   Method of Communication Secure Message  (regarding cxr results)   Response See orders   Notification Time    Orders given for zosyn vanco (pharmacy to dose), and consult to Pulmonary

## 2019-10-18 NOTE — FLOWSHEET NOTE
10/17/19 2100   Provider Notification   Reason for Communication New orders   Provider Name Dr. Emily Kwon   Provider Notification Physician   Method of Communication Secure Message  (regarding direct admit orders)   Response See orders   Notification Time 2135   RN called Dr. Emily Kwon regarding direct admit orders. Home meds started, orders given for solu medrol, labs, cxt-stat, diet, etc.  See orders.

## 2019-10-18 NOTE — H&P
History and Physical Service  [facility]    HISTORY AND PHYSICAL EXAMINATION            Date:   10/18/2019  Patient name:  Teddy Fuller  MRN:   [id number]  Account:  [de-identified]  YOB: 1955  PCP:    Zenobia Ford MD  Code Status:    Prior    Chief Complaint:     No chief complaint on file. History Obtained From:     patient    History of Present Illness: The patient is a 59 y.o. Non-/non  female who presents  Admitted from office   jaundice     sob baseline   no fever           Past Medical History:     Past Medical History:   Diagnosis Date    Atypical depressive disorder     Cancer ([address]     Lung Cancer mets to bone    Hypotension, unspecified     Mitral valve disorders(424.0)     Obstructive chronic bronchitis with exacerbation (HCC)     Other and unspecified hyperlipidemia     Other malaise and fatigue     Temporomandibular joint disorders, unspecified     Unspecified breast disorder     Unspecified vitamin D deficiency         Past Surgical History:     Past Surgical History:   Procedure Laterality Date    HYSTERECTOMY, TOTAL ABDOMINAL      LUNG BIOPSY      IRIS AND BSO  1995        Medications Prior to Admission:     Prior to Admission medications    Medication Sig Start Date End Date Taking? Authorizing Provider   traMADol (ULTRAM) 50 MG tablet Take 1 tablet by mouth every 4 hours as needed for Pain for up to 3 days. Intended supply: 3 days.  Take lowest dose possible to manage pain 10/16/19 10/19/19 Yes Zenaida Primrose, MD   busPIRone (BUSPAR) 10 MG tablet Take 1 tablet by mouth 3 times daily 10/3/19  Yes Emani Sung MD   nicotine (NICODERM CQ) 21 MG/24HR Place 1 patch onto the skin daily 10/4/19  Yes Emani Sugn MD   sodium chloride 1 g tablet Take 1 tablet by mouth 3 times daily (with meals) 10/3/19  Yes Emani Sung MD   ipratropium (ATROVENT) 0.02 % nebulizer solution Take 2.5 mLs by nebulization 4 times daily 10/2/19 11/1/19 Yes Tamar Gordon MD   simvastatin (ZOCOR) 40 MG tablet TAKE 1 TABLET BY MOUTH EVERY NIGHT AT BEDTIME 7/2/19  Yes Itzel Dickson MD   Multiple Vitamins-Minerals (MULTIVITAMIN ADULT PO) Take 1 tablet by mouth daily    Yes Historical Provider, MD   albuterol sulfate HFA (VENTOLIN HFA) 108 (90 Base) MCG/ACT inhaler Inhale 2 puffs into the lungs every 6 hours as needed for Wheezing 6/1/18  Yes Itzel Dickson MD   QUEtiapine (SEROQUEL) 100 MG tablet Take 100 mg by mouth nightly  8/23/16  Yes Historical Provider, MD   Misc. Devices (HOME STYLE BED RAILS) MISC 1 Units by Does not apply route as needed (as needed) 10/8/19   Maddy Orozco MD   ondansetron (ZOFRAN ODT) 4 MG disintegrating tablet Take 1 tablet by mouth every 8 hours as needed for Nausea 9/19/19   Jocelin Cantor MD   famotidine (PEPCID) 20 MG tablet TAKE 1 TABLET BY MOUTH TWICE DAILY 1/4/19   Itzel Dickson MD        Allergies:     Vesicare [solifenacin]    Social History:     Tobacco:    reports that she has been smoking cigarettes. She has a 10.50 pack-year smoking history. She has never used smokeless tobacco.  Alcohol:      reports that she does not drink alcohol. Drug Use:  reports that she does not use drugs. Family History:     Family History   Problem Relation Age of Onset    Cancer Mother     Asthma Father     Cancer Sister     Stroke Sister        Review of Systems:     Positive and Negative as described in HPI. CONSTITUTIONAL:  negative for fevers, chills, sweats, fatigue, weight losspos jaundice  HEENT:  negative for vision, hearing changes, runny nose, throat pain  RESPIRATORY:  Pos for shortness of breath, cough, congestion, wheezing. CARDIOVASCULAR:  negative for chest pain, palpitations.   GASTROINTESTINAL:  negative for nausea, vomiting, diarrhea, constipation, change in bowel habits, abdominal pain   GENITOURINARY:  negative for difficulty of urination, burning with urination, frequency - 100 fL    MCH 27.6 26 - 34 pg    MCHC 32.3 31 - 37 g/dL    RDW 17.2 (H) 11.5 - 14.9 %    Platelets 056 616 - 259 k/uL    MPV 8.7 6.0 - 12.0 fL    NRBC Automated NOT REPORTED per 100 WBC    Differential Type NOT REPORTED     Immature Granulocytes NOT REPORTED 0 %    Absolute Immature Granulocyte NOT REPORTED 0.00 - 0.30 k/uL    WBC Morphology NOT REPORTED     RBC Morphology NOT REPORTED     Platelet Estimate NOT REPORTED     Seg Neutrophils 82 (H) 36 - 66 %    Lymphocytes 9 (L) 24 - 44 %    Monocytes 9 (H) 1 - 7 %    Eosinophils % 0 0 - 4 %    Basophils 0 0 - 2 %    Segs Absolute 11.64 (H) 1.3 - 9.1 k/uL    Absolute Lymph # 1.28 1.0 - 4.8 k/uL    Absolute Mono # 1.28 0.1 - 1.3 k/uL    Absolute Eos # 0.00 0.0 - 0.4 k/uL    Basophils Absolute 0.00 0.0 - 0.2 k/uL    Morphology Normal ANISOCYTOSIS PRESENT     Morphology 1+ TARGET CELLS    Basic Metabolic Panel w/ Reflex to MG    Collection Time: 10/17/19 11:12 PM   Result Value Ref Range    Glucose 114 (H) 70 - 99 mg/dL    BUN 33 (H) 8 - 23 mg/dL    CREATININE 1.04 (H) 0.50 - 0.90 mg/dL    Bun/Cre Ratio NOT REPORTED 9 - 20    Calcium 8.9 8.6 - 10.4 mg/dL    Sodium 134 (L) 135 - 144 mmol/L    Potassium 4.1 3.7 - 5.3 mmol/L    Chloride 99 98 - 107 mmol/L    CO2 18 (L) 20 - 31 mmol/L    Anion Gap 17 9 - 17 mmol/L    GFR Non-African American 53 (L) >60 mL/min    GFR African American >60 >60 mL/min    GFR Comment          GFR Staging NOT REPORTED        Recent Labs     10/17/19  2312 10/10/19  0845  09/25/19  1820   HGB 10.9*  --   --  12.2   HCT 33.7*  --   --  36.5   WBC 14.2*  --   --  8.4   MCV 85.4  --   --  84.3   *  --    < > 117*   K 4.1  --    < > 3.9   CL 99  --    < > 83*   CO2 18*  --    < > 21   BUN 33*  --    < > 5*   CREATININE 1.04*  --    < > 0.69   GLUCOSE 114*  --    < > 118*   INR  --  1.2   < >  --    PROTIME  --  15.5*   < >  --    APTT  --  27.0   < >  --    AST  --   --   --  54*   ALT  --   --   --  54*   LABALBU  --   --   --  3.7    < > = values in this interval not displayed. Hematology:  Recent Labs     10/17/19  2312   WBC 14.2*   RBC 3.94*   HGB 10.9*   HCT 33.7*   MCV 85.4   MCH 27.6   MCHC 32.3   RDW 17.2*      MPV 8.7     Chemistry:  Recent Labs     10/17/19  2312   *   K 4.1   CL 99   CO2 18*   GLUCOSE 114*   BUN 33*   CREATININE 1.04*   ANIONGAP 17   LABGLOM 53*   GFRAA >60   CALCIUM 8.9     No results for input(s): PROT, LABALBU, LABA1C, A0GCJVX, H3WDXMN, FT4, TSH, AST, ALT, LDH, GGT, ALKPHOS, LABGGT, BILITOT, BILIDIR, AMMONIA, AMYLASE, LIPASE, LACTATE, CHOL, HDL, LDLCHOLESTEROL, CHOLHDLRATIO, TRIG, VLDL, [id number], PHENYTOIN, PHENYF, URICACID, POCGLU in the last 72 hours. Imaging/Diagnostics:       Xr Chest Standard (2 Vw)    Result Date: 10/17/2019  EXAMINATION: TWO XRAY VIEWS OF THE CHEST 10/17/2019 10:43 pm COMPARISON: 10/02/2019 HISTORY: ORDERING SYSTEM PROVIDED HISTORY: lung cancer TECHNOLOGIST PROVIDED HISTORY: lung cancer Reason for Exam: lung cancer Acuity: Unknown Type of Exam: Initial FINDINGS: Monitor wires project over the thorax. Right IJ port tip in right atrium. Increased interstitial opacities. Patchy opacities in the right mid lung and right lung base. No pleural effusion or pneumothorax. Heart size is enlarged, stable. 1. New patchy right mid lung and right basilar opacity may represent developing multifocal pneumonia or pulmonary edema. 2. Right IJ port tip in the right atrium. Xr Chest Standard (2 Vw)    Result Date: 10/1/2019  EXAMINATION: TWO XRAY VIEWS OF THE CHEST 10/1/2019 10:11 am COMPARISON: September 30, 2019 HISTORY: ORDERING SYSTEM PROVIDED HISTORY: dyspnea, f/u lung bx TECHNOLOGIST PROVIDED HISTORY: dyspnea, f/u lung bx Reason for Exam: f/u lung bx; dyspnea Acuity: Chronic Type of Exam: Subsequent/Follow-up FINDINGS: Minimal left apical pneumothorax. Persistent left upper lobe opacity. Small pleural effusions. Cardiomegaly. No pulmonary edema.      Minimal left apical reformatted images are provided for review. Dose modulation, iterative reconstruction, and/or weight based adjustment of the mA/kV was utilized to reduce the radiation dose to as low as reasonably achievable. COMPARISON: CT chest 09/27/2019 HISTORY: ORDERING SYSTEM PROVIDED HISTORY: liver lesions, lung mass TECHNOLOGIST PROVIDED HISTORY: Reason for Exam: liver lesions, lung mass Acuity: Unknown Type of Exam: Unknown Additional signs and symptoms: PTY STATES RECENT DIAGNOSIS OF LUNG CANCER, R/O METS Relevant Medical/Surgical History: SURGERIES-IRIS AND BSO FINDINGS: Lower Chest: Bibasilar dependent atelectasis. Partially visualized enlarged subcarinal left hilar lymph nodes. Multiple mildly prominent pericardial lymph nodes. Enlarged distal paraesophageal lymph nodes. Organs: Innumerable hypodense lesions in the liver consistent with metastatic disease. 1.2 cm well-circumscribed hypodense lesion along the inferior gallbladder fossa in the right hepatic lobe which is consistent with simple cyst.  Diffusely nodular hepatic contour. Spleen, pancreas, adrenal glands and left kidney appear unremarkable. 2 non obstructing calculi lower pole right kidney both of which measure approximately 5 mm. GI/Bowel: No bowel obstruction or inflammation. Normal appendix. Moderate size hiatal hernia. Pelvis: Residual contrast in the bladder from recent contrast-enhanced chest CT examination. Hysterectomy. Peritoneum/Retroperitoneum: Upper abdominal lymphadenopathy most prominent in the portal caval and gastrohepatic regions including areas of conglomerate bulky adenopathy measuring up to approximately 2 cm in short axis. Normal caliber abdominal aorta with moderate infrarenal atherosclerosis no abdominal ascites or free air. Bones/Soft Tissues: Hypertrophic degenerative change in the visualized thoracic spine. No suspicious osseous lesion or acute osseous abnormality.      1. Innumerable hypodense lesions in the liver consistent with metastatic disease. 2. Upper abdominal lymphadenopathy consistent with metastatic disease as described. 3. Partially visualized enlarged subcarinal left hilar lymph nodes. Multiple mildly prominent pericardial lymph nodes. Enlarged distal paraesophageal lymph nodes. Findings consistent with metastatic disease please see recent chest CT for further detail. 4. Diffusely nodular hepatic contour suggesting cirrhosis. 5. Moderate size hiatal hernia. 6. Right renal lower pole nephrolithiasis. 7. Few additional incidental/chronic findings as detailed above. Xr Chest Portable    Result Date: 10/2/2019  EXAMINATION: ONE XRAY VIEW OF THE CHEST 10/2/2019 1:58 pm COMPARISON: 10/01/2019 HISTORY: ORDERING SYSTEM PROVIDED HISTORY: l ptx TECHNOLOGIST PROVIDED HISTORY: l ptx Reason for Exam: CONGESTION, DYSPNEA Acuity: Acute Type of Exam: Initial FINDINGS: Cardiomegaly with interstitial edema and vascular congestion. Left upper lung opacity persists possibly persistent pneumonia or alveolar edema. Minute left apical pneumothorax. Persistent congestion. Tiny left apical pneumothorax. Persistent left mid upper lung opacity previously biopsied. Xr Chest Portable    Result Date: 10/1/2019  EXAMINATION: ONE XRAY VIEW OF THE CHEST 10/1/2019 3:58 pm COMPARISON: Chest radiograph performed earlier the same day. HISTORY: ORDERING SYSTEM PROVIDED HISTORY: left apical ptx TECHNOLOGIST PROVIDED HISTORY: left apical ptx Reason for Exam: F/U Pneumo Acuity: Unknown Type of Exam: Unknown FINDINGS: Stable cardiomediastinal silhouette. Normal pulmonary vascularity. Left upper lung opacity is consistent with known left upper lobe nodule. Minimal, 1 mm left apical pneumothorax has not significantly changed. Minimal, 1 mm left apical pneumothorax is not significantly changed.      Xr Chest Portable    Result Date: 10/1/2019  EXAMINATION: ONE XRAY VIEW OF THE CHEST 9/30/2019 2:32 pm COMPARISON: September 30, 2019 12:35 HISTORY: ORDERING SYSTEM PROVIDED HISTORY: follow up SRIRAM lung biopsy TECHNOLOGIST PROVIDED HISTORY: follow up SRIRAM lung biopsy Reason for Exam: Post SRIRAM. Lung Bx. Acuity: Unknown Type of Exam: Unknown FINDINGS: Exam was submitted on October 1, 2019 10:20 a.m. for interpretation. No definite pneumothorax. Persistent left upper lobe opacity. Cardiomegaly. No pulmonary edema. No definite pneumothorax. Xr Chest Portable    Result Date: 10/1/2019  EXAMINATION: ONE XRAY VIEW OF THE CHEST 9/30/2019 12:56 pm COMPARISON: Earlier same day. HISTORY: ORDERING SYSTEM PROVIDED HISTORY: follow up SRIRAM lung biopsy TECHNOLOGIST PROVIDED HISTORY: follow up SRIRAM lung biopsy Reason for Exam: Post Lung biopsy Acuity: Acute Type of Exam: Initial FINDINGS: Heart size is at the upper limits of normal.  Post biopsy changes in the left suprahilar lung. No definite pneumothorax. No pleural effusion. The pulmonary vasculature is slightly prominent. Post biopsy changes in the left suprahilar lung. No definite pneumothorax. Ir Port Placement > 5 Years    Result Date: 10/10/2019  PROCEDURE: ULTRASOUND GUIDED VASCULAR ACCESS. FLUOROSCOPY GUIDED PLACEMENT OF A SUBCUTANEOUS PORT-A-CATH. 10/10/2019. HISTORY: ORDERING SYSTEM PROVIDED HISTORY: Primary malignant neoplasm of lung metastatic to other site, unspecified laterality (HCC) SEDATION: None FLUOROSCOPY DOSE AND TYPE OR TIME AND EXPOSURES: 1 minute 18 seconds; D  cGy cm2 TECHNIQUE: This procedure was performed by Dr. Rl Montemayor. Informed consent was obtained after a detailed explanation of the procedure including risks, benefits, and alternatives. All elements of maximal sterile barrier technique were utilized. Initial ultrasound evaluation shows the right internal jugular vein to be patent and accessible. Static image was obtained for the patient's medical record. Right neck and chest were prepped and draped in standard sterile fashion.  The patient was pretreated with intravenous TECHNOLOGIST PROVIDED HISTORY: left upper lobe lung mass; discussed with Dr. Chantal Grossman: Dose modulation, iterative reconstruction, and/or weight based adjustment of the mA/kV was utilized to reduce the radiation dose to as low as reasonably achievable. CONTRAST: None SEDATION: None DESCRIPTION OF PROCEDURE: Informed consent was obtained after a detailed explanation of the procedure including risks, benefits, and alternatives. Universal protocol was observed. Patient was placed supine on the CT table. The left upper hemithorax was cleaned, prepped and draped in the usual sterile fashion. Local anesthetic was administered after CT localization of the area of interest. An 18 gauge core biopsy needle was advanced to the lesion located in the left upper lung under CT guidance. 3 core biopsy specimens were obtained and sent to pathology for further evaluation. The core biopsy needle was then removed and sterile dressing was applied. A very tiny pneumothorax was noted at the termination of the procedure. Patient tolerated procedure well. FINDINGS: Successful CT-guided core biopsy of lesion located in the upper lobe of the left lung. Tiny postprocedure pneumothorax. Successful CT-guided core biopsy of lesion in upper lobe of left lung with a tiny postprocedural pneumothorax. Follow-up with x-rays chest at 1 hour and 2 hours after termination of procedure. Mri Brain W Wo Contrast    Result Date: 10/1/2019  EXAMINATION: MRI OF THE BRAIN WITHOUT AND WITH CONTRAST  10/1/2019 8:17 pm TECHNIQUE: Multiplanar multisequence MRI of the head/brain was performed without and with the administration of intravenous contrast. COMPARISON: CT head 08/09/2016 HISTORY: ORDERING SYSTEM PROVIDED HISTORY: small cell lung cancer TECHNOLOGIST PROVIDED HISTORY: Reason for Exam: newly dx of lung ca Acuity: Acute Type of Exam: Ongoing FINDINGS: INTRACRANIAL STRUCTURES/VENTRICLES:  There is no acute infarct.  No mass effect or midline shift. No evidence of an acute intracranial hemorrhage. The ventricles and sulci are normal in size and configuration for the patient's age. Mild periventricular subcortical T2 prolongation identified suggestive chronic microvascular disease. .  The sellar/suprasellar regions appear unremarkable. The normal signal voids within the major intracranial vessels appear maintained. No abnormal focus of enhancement is seen within the brain. ORBITS: The visualized portion of the orbits demonstrate no acute abnormality. SINUSES: The visualized paranasal sinuses and mastoid air cells are well aerated. BONES/SOFT TISSUES: Multiple lesions identified involving the calvarium and upper cervical spine most suggestive of osseous metastatic disease. Scattered calvarial and upper cervical spine osseous lesions suggestive of diffuse osseous metastases. Otherwise no evidence of intracranial metastatic disease. Mild chronic small vessel ischemic disease. Impressions :      1. Active Problems:    * No active hospital problems. *  Resolved Problems:    * No resolved hospital problems. *        2.  has a past medical history of Atypical depressive disorder, Cancer ([address] Hypotension, unspecified, Mitral valve disorders(424.0), Obstructive chronic bronchitis with exacerbation ([address] Other and unspecified hyperlipidemia, Other malaise and fatigue, Temporomandibular joint disorders, unspecified, Unspecified breast disorder, and Unspecified vitamin D deficiency. na 134     cr nl      small cell lung ca     liver mets     siadh      Jaundice   likely has mliver mets      No pneumonia   copd mikld excerbation   Pneumonia ?  obstructive     Plans:     1.  lft     ivf     duonebs   gi to see   liver us   alk phos   oncology to see     Iv zosyn/vanco   iv staeroids     Current Facility-Administered Medications   Medication Dose Route Frequency Provider Last Rate Last Dose    vancomycin (VANCOCIN) intermittent dosing

## 2019-10-18 NOTE — PLAN OF CARE
Problem: Falls - Risk of:  Goal: Will remain free from falls  Description  Will remain free from falls  10/18/2019 0551 by Fam Crowley RN  Outcome: Ongoing  10/18/2019 0551 by Fam Crowley RN  Outcome: Ongoing  Goal: Absence of physical injury  Description  Absence of physical injury  10/18/2019 0551 by Fam Crowley RN  Outcome: Ongoing  10/18/2019 0551 by Fam Crowley RN  Outcome: Ongoing   No falls /injuries this shift. Call light within reach, pt uses call light appropriately for assistance.

## 2019-10-18 NOTE — CONSULTS
Hematologic/Lymphatic: negative for easy bruising, bleeding, lymphadenopathy, or petechiae   Endocrine: negative for heat or cold intolerance,weight changes, change in bowel habits and hair loss   Musculoskeletal: negative for myalgias, arthralgias, pain, joint swelling,and bone pain   Neurological: negative for headaches, dizziness, seizures, weakness, numbness    PHYSICAL EXAM:      /60   Pulse 105   Temp 97.8 °F (36.6 °C) (Oral)   Resp 18   Ht 5' 1\" (1.549 m)   Wt 200 lb 9.9 oz (91 kg)   SpO2 93%   BMI 37.91 kg/m²    Temp (24hrs), Av.8 °F (36.6 °C), Min:97.5 °F (36.4 °C), Max:98.1 °F (36.7 °C)    General appearance - well appearing, no in pain or distress   Mental status - alert and cooperative   Eyes -++icterus pupils equal and reactive, extraocular eye movements intact   Ears - bilateral TM's and external ear canals normal   Mouth - mucous membranes moist, pharynx normal without lesions   Neck - supple, no significant adenopathy   Lymphatics - no palpable lymphadenopathy, no hepatosplenomegaly   Chest - clear to auscultation, no wheezes, rales or rhonchi, symmetric air entry   Heart - normal rate, regular rhythm, normal S1, S2, no murmurs  Abdomen - soft, nontender, nondistended, no masses or organomegaly   Neurological - alert, oriented, normal speech, no focal findings or movement disorder noted   Musculoskeletal - no joint tenderness, deformity or swelling   Extremities - peripheral pulses normal, no pedal edema, no clubbing or cyanosis   Skin - ++jaundice    DATA:    Labs:   CBC:   Recent Labs     10/17/19  2312 10/18/19  1320   WBC 14.2* 14.5*   HGB 10.9* 11.4*   HCT 33.7* 34.9*    233     BMP:   Recent Labs     10/17/19  2312 10/18/19  1320   * 132*   K 4.1 3.9   CO2 18* 18*   BUN 33* 34*   CREATININE 1.04* 1.06*   LABGLOM 53* 52*   GLUCOSE 114* 197*     PT/INR: No results for input(s): PROTIME, INR in the last 72 hours.     IMAGING DATA:  Reviewed  Ct abd pelvis

## 2019-10-19 NOTE — PLAN OF CARE
PRE CONSULT ROUNDING NOTE  HPI  59year old female with pmh of newly diagnosed lung cancer with mets to the liver and bone, depression, hypotension, hyponatremia secondary to SIADH, chronic bronchitis,, hyperlipidemia who presented to the hospital for jaundice. She was found to have elevated lft's but no abdominal pain, nausea, fevers, chills,dysphagia, weight loss, itching or change in the bowel habits. LFT's show alk phos of 1173, , , bili 7.86, creat 0.96, WBC 13.9, hgb 10.2. Liver US suggesting cirrhosis with diffuse metastatic disease, collapsed appearance of the gallbladder and unremarkable appearance of the CBD. CT abdomen from 9/28/19 showed multilple liver lesions with diffuse adenopathy cirrhosis and a hiatal hernia. Acute hepatitis panel on 9/26/19 non reactive. She has not had previous liver disease. No melena, hematemesis, or hematochezia. She is on antibiotics for possible pneumonia. She has not received any chemotherapy or radiation yet.     Endoscopy none  Family mom had breast cancer, no hx of colon liver pancreatic or stomach cancer no hx of IBD  Social smokes cigarettes, no etoh or IVDA  BP (!) 106/54   Pulse 115   Temp 97.8 °F (36.6 °C) (Oral)   Resp 18   Ht 5' 1\" (1.549 m)   Wt 207 lb 14.3 oz (94.3 kg)   SpO2 95%   BMI 39.28 kg/m²     ROS as above meds labs imaging and past medical records were reviewed    Exam  General Appearance: alert and oriented to person, place and time, well-developed and well-nourished, in no acute distress  Skin: warm and dry, no rash or erythema, she is jaundice  Head: normocephalic and atraumatic  Eyes: pupils equal, round, and reactive to light, extraocular eye movements intact, scleral icterus present  ENT: hearing grossly normal bilaterally  Neck: neck supple and non tender without mass, no thyromegaly or thyroid nodules, no cervical lymphadenopathy   Pulmonary/Chest: clear to auscultation bilaterally- no wheezes, rales or rhonchi, normal air

## 2019-10-19 NOTE — PLAN OF CARE
C/o lower back and hip pain that prevents her from being as active as she'd like. Moves slowly. Medicalted x1 with Ultram for pain with good relief. Patient sat in chair for many hours today and was up and down from bed twice for bedside ultrasound/ 2 decho testing. Pain level decreased from 7 to 3 on 0-10 pain scale.

## 2019-10-19 NOTE — PROGRESS NOTES
sister; Stroke in her sister. REVIEW OF SYSTEMS:    Constitutional: No fever or chills. No night sweats, no weight loss , weakness, bilateral tremor. Eyes: No eye discharge, double vision, or eye pain   HEENT: negative for sore mouth, sore throat, hoarseness and voice change   Respiratory: negative for cough , sputum,++ dyspnea, wheezing, hemoptysis, chest pain   Cardiovascular: negative for chest pain, ++dyspnea, palpitations, orthopnea, PND   Gastrointestinal: negative for nausea, vomiting, diarrhea, constipation, ++abdominal pain, Dysphagia, hematemesis and hematochezia   Genitourinary: negative for frequency, dysuria, nocturia, urinary incontinence, and hematuria   Integument: negative for rash, skin lesions, bruises.    Hematologic/Lymphatic: negative for easy bruising, bleeding, lymphadenopathy, or petechiae   Endocrine: negative for heat or cold intolerance,weight changes, change in bowel habits and hair loss   Musculoskeletal: negative for myalgias, arthralgias, pain, joint swelling,and bone pain   Neurological: negative for headaches, dizziness, seizures, weakness, numbness    PHYSICAL EXAM:      /66   Pulse 109   Temp 97.5 °F (36.4 °C) (Oral)   Resp 17   Ht 5' 1\" (1.549 m)   Wt 207 lb 14.3 oz (94.3 kg)   SpO2 91%   BMI 39.28 kg/m²    Temp (24hrs), Av.7 °F (36.5 °C), Min:97 °F (36.1 °C), Max:98.3 °F (36.8 °C)    General appearance - ill appearing, no in pain or distress , very weak  Mental status - alert and cooperative   Eyes -++icterus pupils equal and reactive, extraocular eye movements intact   Ears - bilateral TM's and external ear canals normal   Mouth - mucous membranes moist, pharynx normal without lesions   Neck - supple, no significant adenopathy   Lymphatics - no palpable lymphadenopathy, no hepatosplenomegaly   Chest - clear to auscultation, no wheezes, rales or rhonchi, symmetric air entry   Heart - normal rate, regular rhythm, normal S1, S2, no murmurs  Abdomen - soft, nontender, nondistended, no masses or organomegaly   Neurological - alert, oriented, normal speech, no focal findings or movement disorder noted   Musculoskeletal - no joint tenderness, deformity or swelling   Extremities - peripheral pulses normal, no pedal edema, no clubbing or cyanosis   Skin - ++jaundice    DATA:    Labs:   CBC:   Recent Labs     10/18/19  1320 10/19/19  0526   WBC 14.5* 13.9*   HGB 11.4* 10.2*   HCT 34.9* 31.5*    190     BMP:   Recent Labs     10/18/19  1320 10/19/19  0526   * 132*   K 3.9 3.8   CO2 18* 19*   BUN 34* 32*   CREATININE 1.06* 0.96*   LABGLOM 52* 59*   GLUCOSE 197* 181*     Lab Results   Component Value Date     (H) 10/19/2019     (H) 10/19/2019    ALKPHOS 1,173 (H) 10/19/2019    BILITOT 7.86 (H) 10/19/2019       PT/INR: No results for input(s): PROTIME, INR in the last 72 hours. IMAGING DATA:  Reviewed  Ct abd pelvis 9/28  Impression   1. Innumerable hypodense lesions in the liver consistent with metastatic   disease. 2. Upper abdominal lymphadenopathy consistent with metastatic disease as   described. 3. Partially visualized enlarged subcarinal left hilar lymph nodes.  Multiple   mildly prominent pericardial lymph nodes.  Enlarged distal paraesophageal   lymph nodes.  Findings consistent with metastatic disease please see recent   chest CT for further detail. 4. Diffusely nodular hepatic contour suggesting cirrhosis. 5. Moderate size hiatal hernia. 6. Right renal lower pole nephrolithiasis. 7. Few additional incidental/chronic findings as detailed above. Primary Problem  <principal problem not specified>    Active Hospital Problems    Diagnosis Date Noted    Pneumonia [J18.9] 10/18/2019     IMPRESSION:   1. Extensive stage small cell lung cancer with extensive liver metastasis, bone metastasis patient is following with Dr. Junior Sarmiento and was planning to start palliative chemotherapy  2.  Hyperbilirubinemia/jaundice: Likely due to advanced liver metastasis . 3. Elevated alkaline phosphatase and transaminase, likely secondary to metastatic disease  4. Shortness of breath, likely secondary to extensive stage small cell lung cancer and pneumonia  5. Pneumonia in immunocompromised patient    RECOMMENDATIONS:  1. I had a long discussion with the patient, unfortunately most of her condition is rec secondary to progressive cancer. 2. We will continue the current antibiotics and looking for stabilization in the next 2 to 3 days. 3. Ultimately, the plan is to start chemotherapy as soon as possible as I do believe that the patient condition will never improve without treating her underlying malignancy. 4. Considering to treat patient as an inpatient if her condition is not safe enough for discharge. We will finalize the decision by Monday        Discussed with patient and Nurse. Thank you for asking us to see this patient.   [address] [place] [address]

## 2019-10-19 NOTE — PLAN OF CARE
Has weakness of legs. Is slow when getting out of bed with assistance, but does well. Would benefit from use of walker for extra stability. PT is scheduled to see. Does not attempt to get out of bed per self. No injuries this shift. Does c/o lower back and hip pain for which she was medicated x1- she states this helps keep her mobile.

## 2019-10-19 NOTE — PROGRESS NOTES
Bilirubin 8.19 (H) 0.3 - 1.2 mg/dL    Bilirubin, Direct 7.03 (H) <0.31 mg/dL    Bilirubin, Indirect 1.16 (H) 0.00 - 1.00 mg/dL    Total Protein 6.6 6.4 - 8.3 g/dL    Globulin NOT REPORTED 1.5 - 3.8 g/dL    Albumin/Globulin Ratio NOT REPORTED 1.0 - 2.5   CBC    Collection Time: 10/18/19  1:20 PM   Result Value Ref Range    WBC 14.5 (H) 3.5 - 11.0 k/uL    RBC 4.12 4.0 - 5.2 m/uL    Hemoglobin 11.4 (L) 12.0 - 16.0 g/dL    Hematocrit 34.9 (L) 36 - 46 %    MCV 84.7 80 - 100 fL    MCH 27.7 26 - 34 pg    MCHC 32.7 31 - 37 g/dL    RDW 17.5 (H) 11.5 - 14.9 %    Platelets 652 505 - 672 k/uL    MPV 9.1 6.0 - 12.0 fL    NRBC Automated NOT REPORTED per 100 WBC   Basic Metabolic Prof    Collection Time: 10/18/19  1:20 PM   Result Value Ref Range    Glucose 197 (H) 70 - 99 mg/dL    BUN 34 (H) 8 - 23 mg/dL    CREATININE 1.06 (H) 0.50 - 0.90 mg/dL    Bun/Cre Ratio NOT REPORTED 9 - 20    Calcium 8.9 8.6 - 10.4 mg/dL    Sodium 132 (L) 135 - 144 mmol/L    Potassium 3.9 3.7 - 5.3 mmol/L    Chloride 96 (L) 98 - 107 mmol/L    CO2 18 (L) 20 - 31 mmol/L    Anion Gap 18 (H) 9 - 17 mmol/L    GFR Non-African American 52 (L) >60 mL/min    GFR African American >60 >60 mL/min    GFR Comment          GFR Staging NOT REPORTED    Comprehensive Metabolic Panel w/ Reflex to MG    Collection Time: 10/19/19  5:26 AM   Result Value Ref Range    Glucose 181 (H) 70 - 99 mg/dL    BUN 32 (H) 8 - 23 mg/dL    CREATININE 0.96 (H) 0.50 - 0.90 mg/dL    Bun/Cre Ratio NOT REPORTED 9 - 20    Calcium 8.7 8.6 - 10.4 mg/dL    Sodium 132 (L) 135 - 144 mmol/L    Potassium 3.8 3.7 - 5.3 mmol/L    Chloride 100 98 - 107 mmol/L    CO2 19 (L) 20 - 31 mmol/L    Anion Gap 13 9 - 17 mmol/L    Alkaline Phosphatase 1,173 (H) 35 - 104 U/L     (H) 5 - 33 U/L     (H) <32 U/L    Total Bilirubin 7.86 (H) 0.3 - 1.2 mg/dL    Total Protein 5.9 (L) 6.4 - 8.3 g/dL    Alb 2.9 (L) 3.5 - 5.2 g/dL    Albumin/Globulin Ratio NOT REPORTED 1.0 - 2.5    GFR Non- 59 (L) Contrast    Result Date: 9/27/2019  EXAMINATION: CT OF THE CHEST WITH CONTRAST 9/27/2019 11:33 am TECHNIQUE: CT of the chest was performed with the administration of intravenous contrast. Multiplanar reformatted images are provided for review. Dose modulation, iterative reconstruction, and/or weight based adjustment of the mA/kV was utilized to reduce the radiation dose to as low as reasonably achievable. COMPARISON: CT lung screening from 10/29/2018 HISTORY: ORDERING SYSTEM PROVIDED HISTORY: Pulmonary Nodule TECHNOLOGIST PROVIDED HISTORY: Reason for Exam: follow up pulmonary nodule Acuity: Unknown Type of Exam: Unknown FINDINGS: Mediastinum: Mediastinal lymphadenopathy, new from the prior CT. Lymph node conglomerate interposed between the left common carotid and the left subclavian artery measures 3.8 x 2.1 cm. Conglomerate prevascular lymphadenopathy increased from prior exam.  A rounded centrally necrotic lymph node (series 2, image 33) measures 3.2 x 2.2 cm. Subcarinal lymphadenopathy measuring 3.6 x 2.7 cm (series 2, image 51). Left hilar node (series 2, image 41) measures 2.4 x 1.7 cm. Cardiomegaly. Coronary artery disease. Lungs/pleura: Left upper lobe nodule measures 3.0 x 2.3 cm (series 4, image 34). Dependent changes. Atelectasis in the left lower lobe, seemingly related to hilar lymphadenopathy but an endobronchial lesion is not entirely excluded. Upper Abdomen: Heterogeneous liver compatible with diffuse hepatic metastatic disease. Sliding-type hiatal hernia. Lymph node adjacent to the sliding-type hiatal hernia (series 2, image 85) measures 2.4 x 1.4 cm. Soft Tissues/Bones: Age-compatible degenerative change. 3.0 x 2.3 cm left upper lobe pulmonary nodule suspicious for neoplasm. Tissue sampling advised. Extensive mediastinal lymphadenopathy concerning for mediastinal metastatic disease.  Diffuse replacement of hepatic parenchyma with round low-density lesions suspicious for hepatic metastatic disease. The findings were sent to the Radiology Results [address] at 11:59 am on 9/27/2019to be communicated to a licensed caregiver. Us Renal Complete    Result Date: 9/26/2019  EXAMINATION: RETROPERITONEAL ULTRASOUND OF THE KIDNEYS 9/26/2019 COMPARISON: 06/30/2006, CT 07/21/2013 HISTORY: ORDERING SYSTEM PROVIDED HISTORY: RENAL FAILURE, ACUTE (KIDNEY INJURY) FINDINGS: Right kidney measures 12.3 x 4.1 x 4.9 cm without hydronephrosis. Cortical thickness measures 14.2 mm. Left kidney measures 11.9 x 6.3 x 3.7 cm without hydronephrosis. Cortical thickness measures 13.7 mm. Echogenicity appears grossly intact bilaterally. 8-9 mm echogenic focus in the right kidney suspicious for nonobstructing calculus. No perirenal fluid collections. Nonobstructing right renal calculus. Otherwise unremarkable renal ultrasound. Ct Abdomen Pelvis W Iv Contrast Additional Contrast? Oral    Result Date: 9/28/2019  EXAMINATION: CT OF THE ABDOMEN AND PELVIS WITH CONTRAST 9/28/2019 11:16 am TECHNIQUE: CT of the abdomen and pelvis was performed with the administration of intravenous contrast. Multiplanar reformatted images are provided for review. Dose modulation, iterative reconstruction, and/or weight based adjustment of the mA/kV was utilized to reduce the radiation dose to as low as reasonably achievable. COMPARISON: CT chest 09/27/2019 HISTORY: ORDERING SYSTEM PROVIDED HISTORY: liver lesions, lung mass TECHNOLOGIST PROVIDED HISTORY: Reason for Exam: liver lesions, lung mass Acuity: Unknown Type of Exam: Unknown Additional signs and symptoms: PTY STATES RECENT DIAGNOSIS OF LUNG CANCER, R/O METS Relevant Medical/Surgical History: SURGERIES-IRIS AND BSO FINDINGS: Lower Chest: Bibasilar dependent atelectasis. Partially visualized enlarged subcarinal left hilar lymph nodes. Multiple mildly prominent pericardial lymph nodes. Enlarged distal paraesophageal lymph nodes.  Organs: Innumerable hypodense lesions in the liver consistent with metastatic disease. 1.2 cm well-circumscribed hypodense lesion along the inferior gallbladder fossa in the right hepatic lobe which is consistent with simple cyst.  Diffusely nodular hepatic contour. Spleen, pancreas, adrenal glands and left kidney appear unremarkable. 2 non obstructing calculi lower pole right kidney both of which measure approximately 5 mm. GI/Bowel: No bowel obstruction or inflammation. Normal appendix. Moderate size hiatal hernia. Pelvis: Residual contrast in the bladder from recent contrast-enhanced chest CT examination. Hysterectomy. Peritoneum/Retroperitoneum: Upper abdominal lymphadenopathy most prominent in the portal caval and gastrohepatic regions including areas of conglomerate bulky adenopathy measuring up to approximately 2 cm in short axis. Normal caliber abdominal aorta with moderate infrarenal atherosclerosis no abdominal ascites or free air. Bones/Soft Tissues: Hypertrophic degenerative change in the visualized thoracic spine. No suspicious osseous lesion or acute osseous abnormality. 1. Innumerable hypodense lesions in the liver consistent with metastatic disease. 2. Upper abdominal lymphadenopathy consistent with metastatic disease as described. 3. Partially visualized enlarged subcarinal left hilar lymph nodes. Multiple mildly prominent pericardial lymph nodes. Enlarged distal paraesophageal lymph nodes. Findings consistent with metastatic disease please see recent chest CT for further detail. 4. Diffusely nodular hepatic contour suggesting cirrhosis. 5. Moderate size hiatal hernia. 6. Right renal lower pole nephrolithiasis. 7. Few additional incidental/chronic findings as detailed above.      Xr Chest Portable    Result Date: 10/2/2019  EXAMINATION: ONE XRAY VIEW OF THE CHEST 10/2/2019 1:58 pm COMPARISON: 10/01/2019 HISTORY: ORDERING SYSTEM PROVIDED HISTORY: l ptx TECHNOLOGIST PROVIDED HISTORY: l ptx Reason for Exam: CONGESTION, were obtained and sent to pathology for further evaluation. The core biopsy needle was then removed and sterile dressing was applied. A very tiny pneumothorax was noted at the termination of the procedure. Patient tolerated procedure well. FINDINGS: Successful CT-guided core biopsy of lesion located in the upper lobe of the left lung. Tiny postprocedure pneumothorax. Successful CT-guided core biopsy of lesion in upper lobe of left lung with a tiny postprocedural pneumothorax. Follow-up with x-rays chest at 1 hour and 2 hours after termination of procedure. Mri Brain W Wo Contrast    Result Date: 10/1/2019  EXAMINATION: MRI OF THE BRAIN WITHOUT AND WITH CONTRAST  10/1/2019 8:17 pm TECHNIQUE: Multiplanar multisequence MRI of the head/brain was performed without and with the administration of intravenous contrast. COMPARISON: CT head 08/09/2016 HISTORY: ORDERING SYSTEM PROVIDED HISTORY: small cell lung cancer TECHNOLOGIST PROVIDED HISTORY: Reason for Exam: newly dx of lung ca Acuity: Acute Type of Exam: Ongoing FINDINGS: INTRACRANIAL STRUCTURES/VENTRICLES:  There is no acute infarct. No mass effect or midline shift. No evidence of an acute intracranial hemorrhage. The ventricles and sulci are normal in size and configuration for the patient's age. Mild periventricular subcortical T2 prolongation identified suggestive chronic microvascular disease. .  The sellar/suprasellar regions appear unremarkable. The normal signal voids within the major intracranial vessels appear maintained. No abnormal focus of enhancement is seen within the brain. ORBITS: The visualized portion of the orbits demonstrate no acute abnormality. SINUSES: The visualized paranasal sinuses and mastoid air cells are well aerated. BONES/SOFT TISSUES: Multiple lesions identified involving the calvarium and upper cervical spine most suggestive of osseous metastatic disease.      Scattered calvarial and upper cervical spine osseous lesions suggestive of diffuse osseous metastases. Otherwise no evidence of intracranial metastatic disease. Mild chronic small vessel ischemic disease. Impressions :      1. Active Problems:    Pneumonia  Resolved Problems:    * No resolved hospital problems. *        2.  has a past medical history of Atypical depressive disorder, Cancer ([address] Hypotension, unspecified, Mitral valve disorders(424.0), Obstructive chronic bronchitis with exacerbation ([address] Other and unspecified hyperlipidemia, Other malaise and fatigue, Temporomandibular joint disorders, unspecified, Unspecified breast disorder, and Unspecified vitamin D deficiency. na 134     cr nl      small cell lung ca     liver mets     siadh      Jaundice   likely has mliver mets      No pneumonia   copd mikld excerbation   Pneumonia ?  obstructive     Plans:     1.  lft     ivf     duonebs   gi to see   liver us   alk phos   oncology to see     Iv zosyn/vanco   iv staeroids       10/19   obstructive jaundice   needs cbd stent   likely metatstatic small cell lung   chgemo      cont atb   Current Facility-Administered Medications   Medication Dose Route Frequency Provider Last Rate Last Dose    vancomycin (VANCOCIN) intermittent dosing (placeholder)   Other RX Placeholder Dusty Vallecillo MD        vancomycin (VANCOCIN) 1500 mg in dextrose 5 % 250 mL IVPB  1,500 mg Intravenous Q24H Dusty Vallecillo MD   Stopped at 10/19/19 0340    perflutren lipid microspheres (DEFINITY) injection 2.2 mg  2 mL Intravenous ONCE PRN Nia Turner MD        busPIRone (BUSPAR) tablet 10 mg  10 mg Oral TID Dusty Vallecillo MD   10 mg at 10/19/19 0857    famotidine (PEPCID) tablet 20 mg  20 mg Oral BID Dusty Vallecillo MD   20 mg at 10/19/19 0857    ondansetron (ZOFRAN-ODT) disintegrating tablet 4 mg  4 mg Oral Q8H PRN Dusty Vallecillo MD   4 mg at 10/18/19 2139    QUEtiapine (SEROQUEL) tablet 100 mg  100 mg Oral Nightly Dusty Vallecillo MD   100 mg at

## 2019-10-19 NOTE — CONSULTS
[address] [facility]                 [address] [facility], [address]                                  CONSULTATION    PATIENT NAME: Melvina Mills                     :        1955  MED REC NO:   [id number]                              ROOM:       2099  ACCOUNT NO:   [de-identified]                           ADMIT DATE: 10/17/2019  PROVIDER:     Blossom Fontaine    CONSULT DATE:  10/18/2019    REASON FOR CONSULTATION:  Evaluate for pneumonia. HISTORY OF PRESENT ILLNESS:  The patient is a very pleasant 59-year-old  unfortunate female. The patient has been diagnosed with small cell lung  cancer. A biopsy was performed 2019 at the left upper lobe. The  patient did have a port placement. She did have a pneumothorax that was  not treated with chest tube as the pneumothorax was fairly small. The  patient presented to the hospital.  Her main concern is progressively  worsening weakness to the point where she could not do her ADLs and she  got so weak that she has difficulty ambulating. Of note, she was known  to be jaundiced. We have been asked to help assist the patient's  management at this time. Currently she denies shortness of breath. She  denies chest pain. She mentions no fever, no chills, no sweats. PAST MEDICAL HISTORY:  Reveals a diagnosis of extensive small cell lung  cancer, history of vitamin D deficiency in the past, history of breast  disorder in the past, history of depression. PAST SURGICAL HISTORY:  Includes hysterectomy, TAHBSO in  and of  course the lung biopsy recently. ALLERGIES:  Include VESICARE. MEDICATIONS:  Ultram, BuSpar, Nicoderm CQ, Atrovent, multivitamin,  Ventolin HFA. SOCIAL HISTORY:  Smoking history. No alcohol or street drug use. REVIEW OF SYSTEMS:  As per HPI, otherwise systems reviewed and negative. PHYSICAL EXAMINATION:  GENERAL:  The patient is a pleasant but very ill-appearing 59-year-old  female.

## 2019-10-19 NOTE — CONSULTS
patient want to quit as soon as possible   Substance Use Topics    Alcohol use: No     Alcohol/week: 0.0 standard drinks        PSYCH HISTORY:  Depression No  Anxiety No  Suicide No       Family History   Problem Relation Age of Onset    Cancer Mother     Asthma Father     Cancer Sister     Stroke Sister       No family history of colon cancer, Crohn's disease, or ulcerative colitis. Review of Systems  Constitutional: negative  Eyes: negative  Ears, nose, mouth, throat, and face: negative  Respiratory: negative  Cardiovascular: negative  Gastrointestinal: negative  Genitourinary:negative  Integument/breast: negative  Hematologic/lymphatic: negative  Musculoskeletal:negative  Endocrine: negative           Physical Exam  Blood pressure (!) 106/54, pulse 115, temperature 97.8 °F (36.6 °C), temperature source Oral, resp. rate 16, height 5' 1\" (1.549 m), weight 207 lb 14.3 oz (94.3 kg), SpO2 94 %.          General Appearance: alert and oriented to person, place and time, well-developed and well-nourished, in no acute distress  Skin: warm and dry, no rash or erythema  Head: normocephalic and atraumatic  Eyes: pupils equal, round, and reactive to light, extraocular eye movements intact, conjunctivae normal  ENT: hearing grossly normal bilaterally  Neck: neck supple and non tender without mass, no thyromegaly or thyroid nodules, no cervical lymphadenopathy   Pulmonary/Chest: clear to auscultation bilaterally- no wheezes, rales or rhonchi, normal air movement, no respiratory distress  Cardiovascular: normal rate, regular rhythm, normal S1 and S2, no murmurs, rubs, clicks or gallops, distal pulses intact, no carotid bruits  Abdomen: soft, non-tender, non-distended, normal bowel sounds, no masses or organomegaly  Extremities: no cyanosis, clubbing or edema  Musculoskeletal: normal range of motion, no joint swelling, deformity or tenderness  Neurologic: no cranial nerve deficit and muscle strength normal    Data Review: with any questions or concerns.      Jareth Hitchcock MD

## 2019-10-20 NOTE — PROGRESS NOTES
History and Physical Service  [facility] Internal Medicine                Date:   10/20/2019  Patient name:  Kimberli Cuevas  MRN:   [id number]  Account:  [de-identified]  YOB: 1955  PCP:    Brandon Norton MD  Code Status:    DNR-CCA    Chief Complaint:     No chief complaint on file. History Obtained From:     patient    History of Present Illness: The patient is a 59 y.o. Non-/non  female who presents  Admitted from office   jaundice     sob baseline   no fever       10/19     obstructive jaundice   no fever       10/20       Stable   no fever   lft worse    Cr 1.4   wbc 18      Past Medical History:     Past Medical History:   Diagnosis Date    Atypical depressive disorder     Cancer ([address]     Lung Cancer mets to bone    Hypotension, unspecified     Mitral valve disorders(424.0)     Obstructive chronic bronchitis with exacerbation (HCC)     Other and unspecified hyperlipidemia     Other malaise and fatigue     Temporomandibular joint disorders, unspecified     Unspecified breast disorder     Unspecified vitamin D deficiency         Past Surgical History:     Past Surgical History:   Procedure Laterality Date    HYSTERECTOMY, TOTAL ABDOMINAL      LUNG BIOPSY      IRIS AND BSO  1995        Medications Prior to Admission:     Prior to Admission medications    Medication Sig Start Date End Date Taking?  Authorizing Provider   busPIRone (BUSPAR) 10 MG tablet Take 1 tablet by mouth 3 times daily 10/3/19  Yes Lila Miguel MD   nicotine (NICODERM CQ) 21 MG/24HR Place 1 patch onto the skin daily 10/4/19  Yes Lila Miguel MD   sodium chloride 1 g tablet Take 1 tablet by mouth 3 times daily (with meals) 10/3/19  Yes Lila Miguel MD   ipratropium (ATROVENT) 0.02 % nebulizer solution Take 2.5 mLs by nebulization 4 times daily 10/2/19 11/1/19 Yes Arabella Turner MD   simvastatin (ZOCOR) 40 MG tablet TAKE 1 TABLET BY MOUTH EVERY NIGHT AT BEDTIME 7/2/19  Yes Rosemary Lockett MD   Multiple Vitamins-Minerals (MULTIVITAMIN ADULT PO) Take 1 tablet by mouth daily    Yes Historical Provider, MD   albuterol sulfate HFA (VENTOLIN HFA) 108 (90 Base) MCG/ACT inhaler Inhale 2 puffs into the lungs every 6 hours as needed for Wheezing 6/1/18  Yes Rosemary Lockett MD   QUEtiapine (SEROQUEL) 100 MG tablet Take 100 mg by mouth nightly  8/23/16  Yes Historical Provider, MD   Misc. Devices (HOME STYLE BED RAILS) MISC 1 Units by Does not apply route as needed (as needed) 10/8/19   Sonu Brooks MD   ondansetron (ZOFRAN ODT) 4 MG disintegrating tablet Take 1 tablet by mouth every 8 hours as needed for Nausea 9/19/19   Tarsha Hall MD   famotidine (PEPCID) 20 MG tablet TAKE 1 TABLET BY MOUTH TWICE DAILY 1/4/19   Rosemary Lockett MD        Allergies:     Vesicare [solifenacin]    Social History:     Tobacco:    reports that she has been smoking cigarettes. She has a 10.50 pack-year smoking history. She has never used smokeless tobacco.  Alcohol:      reports that she does not drink alcohol. Drug Use:  reports that she does not use drugs. Family History:     Family History   Problem Relation Age of Onset    Cancer Mother     Asthma Father     Cancer Sister     Stroke Sister        Review of Systems:     Positive and Negative as described in HPI. CONSTITUTIONAL:  negative for fevers, chills, sweats, fatigue, weight losspos jaundice  HEENT:  negative for vision, hearing changes, runny nose, throat pain  RESPIRATORY:  Pos for shortness of breath, cough, congestion, wheezing. CARDIOVASCULAR:  negative for chest pain, palpitations.   GASTROINTESTINAL:  negative for nausea, vomiting, diarrhea, constipation, change in bowel habits, abdominal pain   GENITOURINARY:  negative for difficulty of urination, burning with urination, frequency   INTEGUMENT:  negative for rash, skin lesions, easy bruising   HEMATOLOGIC/LYMPHATIC:  negative for swelling/edema   ALLERGIC/IMMUNOLOGIC: negative for urticaria , itching  ENDOCRINE:  negative increase in drinking, increase in urination, hot or cold intolerance  MUSCULOSKELETAL:  negative joint pains, muscle aches, swelling of joints  NEUROLOGICAL:  negative for headaches, dizziness, lightheadedness, numbness, pain, tingling extremities  BEHAVIOR/PSYCH:  negative for depression, anxiety    Physical Exam:   /80   Pulse 108   Temp 97.5 °F (36.4 °C) (Oral)   Resp 16   Ht 5' 1\" (1.549 m)   Wt 207 lb 14.3 oz (94.3 kg)   SpO2 91%   BMI 39.28 kg/m²   No results for input(s): POCGLU in the last 72 hours. General Appearance:  alert, well appearing, and in no acute distress jaundice  Mental status: oriented to person, place, and time with normal affect  Head:  normocephalic, atraumatic. Eye: no icterus, redness, pupils equal and reactive, extraocular eye movements intact, conjunctiva clear  Mouth: mucous membranes moist  Neck: supple, no carotid bruits, thyroid not palpable  Lungs:   decreaseed air entry   Cardiovascular: normal rate, regular rhythm, no murmur, gallop, rub.   Abdomen: Soft, nontender, nondistended, normal bowel sounds, no hepatomegaly or splenomegaly  Neurologic: There are no new focal motor or sensory deficits, normal muscle tone and bulk, no abnormal sensation, normal speech, cranial nerves II through XII grossly intact  Skin: No gross lesions, rashes, bruising or bleeding on exposed skin area  Extremities:  peripheral pulses palpable, no pedal edema or calf pain with palpation      Investigations:      Laboratory Testing:  Recent Results (from the past 24 hour(s))   VANCOMYCIN, TROUGH    Collection Time: 10/20/19 12:13 AM   Result Value Ref Range    Vancomycin Tr 13.7 10.0 - 20.0 ug/mL    Vancomycin Trough Dose amount 1500 MG     Vancomycin Trough Date last dose 10,192,019     Vancomycin Trough Time last dose 208    Comprehensive Metabolic Panel w/ Reflex to MG    Collection Time: 10/20/19  5:17 AM   Result Value Ref Range RENAL FAILURE, ACUTE (KIDNEY INJURY) FINDINGS: Right kidney measures 12.3 x 4.1 x 4.9 cm without hydronephrosis. Cortical thickness measures 14.2 mm. Left kidney measures 11.9 x 6.3 x 3.7 cm without hydronephrosis. Cortical thickness measures 13.7 mm. Echogenicity appears grossly intact bilaterally. 8-9 mm echogenic focus in the right kidney suspicious for nonobstructing calculus. No perirenal fluid collections. Nonobstructing right renal calculus. Otherwise unremarkable renal ultrasound. Ct Abdomen Pelvis W Iv Contrast Additional Contrast? Oral    Result Date: 9/28/2019  EXAMINATION: CT OF THE ABDOMEN AND PELVIS WITH CONTRAST 9/28/2019 11:16 am TECHNIQUE: CT of the abdomen and pelvis was performed with the administration of intravenous contrast. Multiplanar reformatted images are provided for review. Dose modulation, iterative reconstruction, and/or weight based adjustment of the mA/kV was utilized to reduce the radiation dose to as low as reasonably achievable. COMPARISON: CT chest 09/27/2019 HISTORY: ORDERING SYSTEM PROVIDED HISTORY: liver lesions, lung mass TECHNOLOGIST PROVIDED HISTORY: Reason for Exam: liver lesions, lung mass Acuity: Unknown Type of Exam: Unknown Additional signs and symptoms: PTY STATES RECENT DIAGNOSIS OF LUNG CANCER, R/O METS Relevant Medical/Surgical History: SURGERIES-IRIS AND BSO FINDINGS: Lower Chest: Bibasilar dependent atelectasis. Partially visualized enlarged subcarinal left hilar lymph nodes. Multiple mildly prominent pericardial lymph nodes. Enlarged distal paraesophageal lymph nodes. Organs: Innumerable hypodense lesions in the liver consistent with metastatic disease. 1.2 cm well-circumscribed hypodense lesion along the inferior gallbladder fossa in the right hepatic lobe which is consistent with simple cyst.  Diffusely nodular hepatic contour. Spleen, pancreas, adrenal glands and left kidney appear unremarkable.   2 non obstructing calculi lower pole right Xr Chest Portable    Result Date: 10/1/2019  EXAMINATION: ONE XRAY VIEW OF THE CHEST 10/1/2019 3:58 pm COMPARISON: Chest radiograph performed earlier the same day. HISTORY: ORDERING SYSTEM PROVIDED HISTORY: left apical ptx TECHNOLOGIST PROVIDED HISTORY: left apical ptx Reason for Exam: F/U Pneumo Acuity: Unknown Type of Exam: Unknown FINDINGS: Stable cardiomediastinal silhouette. Normal pulmonary vascularity. Left upper lung opacity is consistent with known left upper lobe nodule. Minimal, 1 mm left apical pneumothorax has not significantly changed. Minimal, 1 mm left apical pneumothorax is not significantly changed. Xr Chest Portable    Result Date: 10/1/2019  EXAMINATION: ONE XRAY VIEW OF THE CHEST 9/30/2019 2:32 pm COMPARISON: September 30, 2019 12:35 HISTORY: ORDERING SYSTEM PROVIDED HISTORY: follow up SRIRAM lung biopsy TECHNOLOGIST PROVIDED HISTORY: follow up SRIRAM lung biopsy Reason for Exam: Post SRIRAM. Lung Bx. Acuity: Unknown Type of Exam: Unknown FINDINGS: Exam was submitted on October 1, 2019 10:20 a.m. for interpretation. No definite pneumothorax. Persistent left upper lobe opacity. Cardiomegaly. No pulmonary edema. No definite pneumothorax. Xr Chest Portable    Result Date: 10/1/2019  EXAMINATION: ONE XRAY VIEW OF THE CHEST 9/30/2019 12:56 pm COMPARISON: Earlier same day. HISTORY: ORDERING SYSTEM PROVIDED HISTORY: follow up SRIRAM lung biopsy TECHNOLOGIST PROVIDED HISTORY: follow up SRIRAM lung biopsy Reason for Exam: Post Lung biopsy Acuity: Acute Type of Exam: Initial FINDINGS: Heart size is at the upper limits of normal.  Post biopsy changes in the left suprahilar lung. No definite pneumothorax. No pleural effusion. The pulmonary vasculature is slightly prominent. Post biopsy changes in the left suprahilar lung. No definite pneumothorax. Ir Port Placement > 5 Years    Result Date: 10/10/2019  PROCEDURE: ULTRASOUND GUIDED VASCULAR ACCESS.  FLUOROSCOPY GUIDED PLACEMENT OF A Tiny postprocedure pneumothorax. Successful CT-guided core biopsy of lesion in upper lobe of left lung with a tiny postprocedural pneumothorax. Follow-up with x-rays chest at 1 hour and 2 hours after termination of procedure. Mri Brain W Wo Contrast    Result Date: 10/1/2019  EXAMINATION: MRI OF THE BRAIN WITHOUT AND WITH CONTRAST  10/1/2019 8:17 pm TECHNIQUE: Multiplanar multisequence MRI of the head/brain was performed without and with the administration of intravenous contrast. COMPARISON: CT head 08/09/2016 HISTORY: ORDERING SYSTEM PROVIDED HISTORY: small cell lung cancer TECHNOLOGIST PROVIDED HISTORY: Reason for Exam: newly dx of lung ca Acuity: Acute Type of Exam: Ongoing FINDINGS: INTRACRANIAL STRUCTURES/VENTRICLES:  There is no acute infarct. No mass effect or midline shift. No evidence of an acute intracranial hemorrhage. The ventricles and sulci are normal in size and configuration for the patient's age. Mild periventricular subcortical T2 prolongation identified suggestive chronic microvascular disease. .  The sellar/suprasellar regions appear unremarkable. The normal signal voids within the major intracranial vessels appear maintained. No abnormal focus of enhancement is seen within the brain. ORBITS: The visualized portion of the orbits demonstrate no acute abnormality. SINUSES: The visualized paranasal sinuses and mastoid air cells are well aerated. BONES/SOFT TISSUES: Multiple lesions identified involving the calvarium and upper cervical spine most suggestive of osseous metastatic disease. Scattered calvarial and upper cervical spine osseous lesions suggestive of diffuse osseous metastases. Otherwise no evidence of intracranial metastatic disease. Mild chronic small vessel ischemic disease. Impressions :      1. Active Problems:    Pneumonia    Elevated LFTs    Hepatic cirrhosis ([address]  Resolved Problems:    * No resolved hospital problems.  *        2.  has a past medical history of Atypical depressive disorder, Cancer ([address] Hypotension, unspecified, Mitral valve disorders(424.0), Obstructive chronic bronchitis with exacerbation ([address] Other and unspecified hyperlipidemia, Other malaise and fatigue, Temporomandibular joint disorders, unspecified, Unspecified breast disorder, and Unspecified vitamin D deficiency. na 134     cr nl      small cell lung ca     liver mets     siadh      Jaundice   likely has mliver mets      No pneumonia   copd mikld excerbation   Pneumonia ?  obstructive     Plans:     1.  lft     ivf     duonebs   gi to see   liver us   alk phos   oncology to see     Iv zosyn/vanco   iv staeroids       10/19   obstructive jaundice   needs cbd stent   likely metatstatic small cell lung   chgemo      cont atb     10/20     obstructive jaundice from liver mets   wbc elevated    on steroids   no fever   cr 1.4 worse   start ns at 100    Doubt cholngitis   on impiric osyn/ vanco   will need palliative stent too relieve obstruction     Current Facility-Administered Medications   Medication Dose Route Frequency Provider Last Rate Last Dose    vancomycin (VANCOCIN) 1750 mg in dextrose 5% 500 mL IVPB  1,750 mg Intravenous Q24H Mariola Shanks MD   1,750 mg at 10/20/19 0318    0.9 % sodium chloride infusion   Intravenous Continuous Enio Boyd MD        vancomycin (VANCOCIN) intermittent dosing (placeholder)   Other RX Placeholder Mariola Shanks MD        perflutren lipid microspheres (DEFINITY) injection 2.2 mg  2 mL Intravenous ONCE PRN Jackson Cintron MD        busPIRone (BUSPAR) tablet 10 mg  10 mg Oral TID Mariola Shanks MD   10 mg at 10/20/19 0850    famotidine (PEPCID) tablet 20 mg  20 mg Oral BID Mariola Shanks MD   20 mg at 10/20/19 0849    ondansetron (ZOFRAN-ODT) disintegrating tablet 4 mg  4 mg Oral Q8H PRN Mariola Shanks MD   4 mg at 10/20/19 1215    QUEtiapine (SEROQUEL) tablet 100 mg  100 mg Oral Nightly Mariola Shanks MD

## 2019-10-20 NOTE — PROGRESS NOTES
findings or movement disorder noted   Musculoskeletal - no joint tenderness, deformity or swelling   Extremities - peripheral pulses normal, no pedal edema, no clubbing or cyanosis   Skin - ++jaundice    DATA:    Labs:   CBC:   Recent Labs     10/19/19  0526 10/20/19  0517   WBC 13.9* 18.5*   HGB 10.2* 11.0*   HCT 31.5* 33.3*    224     BMP:   Recent Labs     10/19/19  0526 10/20/19  0517   * 134*   K 3.8 4.1   CO2 19* 19*   BUN 32* 36*   CREATININE 0.96* 1.40*   LABGLOM 59* 38*   GLUCOSE 181* 164*     Lab Results   Component Value Date     (H) 10/20/2019     (H) 10/20/2019    ALKPHOS 1,406 (H) 10/20/2019    BILITOT 9.81 (H) 10/20/2019       PT/INR: No results for input(s): PROTIME, INR in the last 72 hours. IMAGING DATA:  Reviewed  Ct abd pelvis 9/28  Impression   1. Innumerable hypodense lesions in the liver consistent with metastatic   disease. 2. Upper abdominal lymphadenopathy consistent with metastatic disease as   described. 3. Partially visualized enlarged subcarinal left hilar lymph nodes.  Multiple   mildly prominent pericardial lymph nodes.  Enlarged distal paraesophageal   lymph nodes.  Findings consistent with metastatic disease please see recent   chest CT for further detail. 4. Diffusely nodular hepatic contour suggesting cirrhosis. 5. Moderate size hiatal hernia. 6. Right renal lower pole nephrolithiasis. 7. Few additional incidental/chronic findings as detailed above. Primary Problem  <principal problem not specified>    Active Hospital Problems    Diagnosis Date Noted    Elevated LFTs [R94.5]     Hepatic cirrhosis (HCC) [K74.60]     Pneumonia [J18.9] 10/18/2019     IMPRESSION:   1. Extensive stage small cell lung cancer with extensive liver metastasis, bone metastasis patient is following with Dr. Constanza Garcia and was planning to start palliative chemotherapy  2. Hyperbilirubinemia/jaundice: Likely due to advanced liver metastasis .    3. Elevated

## 2019-10-20 NOTE — PROGRESS NOTES
Idiopathic hypotension    Sweating abnormality    Mild cognitive disorder    Idiopathic hypotension    Contusion of left back wall of thorax    Pain at rest    Inability to ambulate due to knee    Fall with injury    [address] disorder in full [facility] [address] [address]    Acute cystitis without hematuria    Left elbow contusion    Mobility impaired    Tibial plateau fracture, left    Chronic pain of left knee    Obesity (BMI 35.0-39.9 without comorbidity)    Hyponatremia    Small cell lung cancer (HCC)    Pneumonia    Elevated LFTs    Hepatic cirrhosis (HCC)     Diagnosis of extensive small cell CA. Recent CT scan reveals a groundglass area in the right lung.   Continue with Solu-Medrol but will decrease to 30 every 6 hours  Continue with DuoNeb treatments  On Zosyn and Vanco  GI consult service evaluating biliary tract pathology    Electronically signed by Mayi Ramirez MD on 10/19/2019 at 8:22 PM

## 2019-10-20 NOTE — PROGRESS NOTES
Dr Anastasiia Diaz [facility] rounded, discussed need to start chemotherapy soon. Will wait for MRCP and discuss with Dr Jefferson Aguero.

## 2019-10-20 NOTE — PROGRESS NOTES
distress  Cardiovascular: normal rate, regular rhythm, normal S1 and S2, no murmurs, rubs, clicks or gallops, distal pulses intact, no carotid bruits  Abdomen: soft, non-tender, non-distended, normal bowel sounds, no masses or organomegaly obese  Extremities: no cyanosis, clubbing or edema  Musculoskeletal: normal range of motion, no joint swelling, deformity or tenderness  Neurologic: no cranial nerve deficit and muscle strength normal     Lab and Imaging Review     CBC  Recent Labs     10/18/19  1320 10/19/19  0526 10/20/19  0517   WBC 14.5* 13.9* 18.5*   HGB 11.4* 10.2* 11.0*   HCT 34.9* 31.5* 33.3*   MCV 84.7 85.0 86.2    190 224       BMP  Recent Labs     10/18/19  1320 10/19/19  0526 10/20/19  0517   * 132* 134*   K 3.9 3.8 4.1   CL 96* 100 99   CO2 18* 19* 19*   BUN 34* 32* 36*   CREATININE 1.06* 0.96* 1.40*   GLUCOSE 197* 181* 164*   CALCIUM 8.9 8.7 8.6       LFTS  Recent Labs     10/18/19  1320 10/19/19  0526 10/20/19  0517   ALKPHOS 1,348* 1,173* 1,406*   * 220* 307*   * 317* 398*   PROT 6.6 5.9* 6.3*   BILITOT 8.19* 7.86* 9.81*   BILIDIR 7.03*  --   --    LABALBU 3.2* 2.9* 3.2*   Results for Nazario Delay (MRN [id number]) as of 10/20/2019 10:55   Ref. Range 10/20/2019 08:24   GGT Latest Ref Range: 5 - 36 U/L 2,344 (H)   Results for Nazario Delay (MRN [id number]) as of 10/20/2019 10:55   Ref. Range 10/20/2019 05:17   Ferritin Latest Ref Range: 13 - 150 ug/L 474 (H)   Iron Latest Ref Range: 37 - 145 ug/dL 96   Iron Saturation Latest Ref Range: 20 - 55 % 34   UIBC Latest Ref Range: 112 - 347 ug/dL 183   TIBC Latest Ref Range: 250 - 450 ug/dL 279   Folate Latest Ref Range: >4.8 ng/mL 7.5   Vitamin B-12 Latest Ref Range: 232 - 1245 pg/mL 1262 (H)   Results for Nazario Delay (MRN [id number]) as of 10/20/2019 10:55   Ref.  Range 9/26/2019 11:56   Hep A IgM Latest Ref Range: NONREACTIVE  NONREACTIVE   Hepatitis B Surface Ag Latest Ref Range: NONREACTIVE  NONREACTIVE   Hepatitis C Ab Latest Ref for further eval of the biliary tree, she may need ercp with stenting  -recheck lft in am    2. Possible cirrhosis  -autoimmune testing pending  -2gm low salt diet    3.anemia; no overt bleeding  -anemia panel negative for iron deficiency, may be related to cancer  -trend h/h    4.possible pneumonia; antibiotics per primary service      This plan was formulated in collaboration with Dr. Jarett Evans. Electronically signed by: AUSTEN Dubon - CNP on 10/20/2019 at 7:39 AM     Attending Physician Statement  I have discussed the care of Patrick Jauregui and   I have examined the patient myselft independently, and taken ros and hpi , including pertinent history and exam findings,  with the author of this note . I have reviewed the key elements of all parts of the encounter with the nurse practitioner/resident.     I agree with the assessment, plan and orders as documented by the above health care provider         Electronically signed by Avila Quintana MD

## 2019-10-20 NOTE — PROGRESS NOTES
Physical Therapy    Facility/Department: [facility] PROGRESSIVE CARE  Initial Assessment    NAME: Courtney Yu  : 1955  MRN: [id number]    Date of Service: 10/20/2019    Discharge Recommendations:  Subacute/Skilled Nursing Facility   PT Equipment Recommendations  Equipment Needed: No    Assessment   Body structures, Functions, Activity limitations: Decreased functional mobility ; Decreased strength;Decreased endurance;Decreased balance  Assessment: continue per POC to maxmize potential for D/C. Pt would benefit from SNF at D/C due to generalized weakness and fatigue. Treatment Diagnosis: impaired mobility, weakness  Specific instructions for Next Treatment: 10-20-19 SNF; pt stood next to the bed x 2 minutes w/ min x 1, dangled at the EOB x 8 minutes w/ supervision, Refused walking or transfer to chair due to fatigue. FALL RISK. Prognosis: Fair  Decision Making: Medium Complexity  History: per chart, pt was a direct admit from PCP office due to jaundice, bilateral LE swelling and SOB. Pt has hx lung CA w/ bone mets. R/O liver mets. Exam: ROM, MMT, balance and mobility assessments  Clinical Presentation: pt stood next to the bed x 2 minutes w/ min x 1, dangled at the EOB x 8 minutes w/ supervision, Refused walking or transfer to chair due to fatigue. FALL RISK. PT Education: Goals;PT Role;Plan of Care  Barriers to Learning: none  REQUIRES PT FOLLOW UP: Yes  Activity Tolerance  Activity Tolerance: Patient limited by fatigue;Patient limited by endurance       Patient Diagnosis(es): There were no encounter diagnoses. has a past medical history of Atypical depressive disorder, Cancer ([address] Hypotension, unspecified, Mitral valve disorders(424.0), Obstructive chronic bronchitis with exacerbation ([address] Other and unspecified hyperlipidemia, Other malaise and fatigue, Temporomandibular joint disorders, unspecified, Unspecified breast disorder, and Unspecified vitamin D deficiency.    has a past surgical history that includes sunny and bso (cervix removed) (1995); Lung biopsy; and Hysterectomy, total abdominal.    Restrictions  Restrictions/Precautions  Restrictions/Precautions: Fall Risk(O2 at 3.5 L, port right chest, peripheral IV right forearm)  Required Braces or Orthoses?: No  Vision/Hearing  Vision: Impaired  Vision Exceptions: Wears glasses at all times  Hearing: Within functional limits     Subjective  General  Patient assessed for rehabilitation services?: Yes  Additional Pertinent Hx: hx lung CA w/ mets to bone  Response To Previous Treatment: Not applicable  Family / Caregiver Present: No  Referring Practitioner: Dr. Pam Dowd. Deb  Referral Date : 10/19/19  Diagnosis: small cell lung CA  Follows Commands: Within Functional Limits  Other (Comment): OK per nurse Lloyd ROSALES  General Comment  Comments: per chart, pt was a direct admit from PCP office due to jaundice, bilateral LE swelling and SOB. Pt admitted w/  total bilirubin is 8.1 with a direct bilirubin of 7.03. R/O liver mets. Venous scan for bilateral LEs are negative. Pt to have MRCP tomorrow w/ possible pallitive biliary stent if obstruction present. Subjective  Subjective: \"I had a UTI forever and couldn't get over it. They found out I had CA. \"  Pain Screening  Patient Currently in Pain: Denies  Vital Signs  Patient Currently in Pain: Denies       Orientation  Orientation  Overall Orientation Status: Impaired(place: [address] [place] [address], month: October 20th, year: Kassidy Sender corrected to [address] [place] [address];  President: You don't want to know- SERJIO; YOB: 1955)  Orientation Level: Oriented to place;Oriented to situation;Oriented to person;Disoriented to time  Social/Functional History  Social/Functional History  Lives With: Family(sister)  Type of Home: Trailer  Home Layout: One level  Home Access: Stairs to enter with rails  Entrance Stairs - Number of Steps: 4  Entrance Stairs - Rails: Both  Bathroom Shower/Tub: Tub/Shower unit, Shower chair with back, Curtain  Bathroom Static: Good;-  Sitting - Dynamic: Fair;+  Standing - Static: Fair;+(held chair back)  Standing - Dynamic: Fair(held chair back)        Plan   Plan  Times per week: 5-7 treatments/ week  Times per day: (5-7 treatments/ week)  Specific instructions for Next Treatment: 10-20-19 SNF; pt stood next to the bed x 2 minutes w/ min x 1, dangled at the EOB x 8 minutes w/ supervision, Refused walking or transfer to chair due to fatigue. FALL RISK. Current Treatment Recommendations: Strengthening, Transfer Training, Endurance Training, Patient/Caregiver Education & Training, Balance Training, Gait Training, Functional Mobility Training  Safety Devices  Type of devices:  All fall risk precautions in place, Gait belt, Patient at risk for falls, Bed alarm in place, Call light within reach, Left in bed, Nurse notified(nurse Rosita ROSALES)    G-Code       OutComes Score                                                  AM-PAC Score  AM-PAC Inpatient Mobility Raw Score : 12 (10/20/19 1435)  AM-PAC Inpatient T-Scale Score : 35.33 (10/20/19 1435)  Mobility Inpatient CMS 0-100% Score: 68.66 (10/20/19 1435)  Mobility Inpatient CMS G-Code Modifier : CL (10/20/19 1435)          Goals  Short term goals  Time Frame for Short term goals: 5-7 treatments/ week  Short term goal 1: pt to tolerate 1/2 hour of therapuetic exercise and activity  Short term goal 2: pt to demonstrate good technique for LE strengthening, balance activities and energy conservation techniques  Short term goal 3: pt to demonstrate MOD I for rolling and supervision for transfers supine <> sit for position change  Short term goal 4: pt to demonstrate transfers sit <> stand and bed <> chair using w walker w/ CGA x 1  Short term goal 5: pt to demonstrate gait w/ w walker 50-80' w/ CGA x 1 and W/C follow if needed  Short term goal 6: pt to demonstrate fair + or better  standing balance using w walker  Short term goal 7: pt to advance to 4-6\" platform step using AD w/ min x 1  Patient

## 2019-10-20 NOTE — PROGRESS NOTES
Other RX Placeholder    busPIRone  10 mg Oral TID    famotidine  20 mg Oral BID    QUEtiapine  100 mg Oral Nightly    simvastatin  40 mg Oral Nightly    sodium chloride  1 g Oral TID WC    nicotine  1 patch Transdermal Daily    piperacillin-tazobactam (ZOSYN) 3.375 g in dextrose 5% IVPB extended infusion (mini-bag)  3.375 g Intravenous Q8H    ipratropium-albuterol  1 ampule Inhalation Q4H WA      sodium chloride 100 mL/hr at 10/20/19 1309     morphine, ondansetron, perflutren lipid microspheres, traMADol    LABS   CBC   Recent Labs     10/20/19  0517   WBC 18.5*   HGB 11.0*   HCT 33.3*   MCV 86.2        BMP:   Lab Results   Component Value Date     10/20/2019    K 4.1 10/20/2019    CL 99 10/20/2019    CO2 19 10/20/2019    BUN 36 10/20/2019    LABALBU 3.2 10/20/2019    LABALBU 4.3 05/17/2012    CREATININE 1.40 10/20/2019    CALCIUM 8.6 10/20/2019    GFRAA 46 10/20/2019    LABGLOM 38 10/20/2019     ABGs:No results found for: PHART, PO2ART, PZA1POU No results found for: IFIO2, MODE, SETTIDVOL, SETPEEP  Ionized Calcium:  No results found for: IONCA  Magnesium:    Lab Results   Component Value Date    MG 1.8 09/25/2019     Phosphorus:  No results found for: PHOS     LIVER PROFILE   Recent Labs     10/18/19  1320  10/20/19  0517   *   < > 398*   *   < > 307*   BILIDIR 7.03*  --   --    BILITOT 8.19*   < > 9.81*   ALKPHOS 1,348*   < > 1,406*    < > = values in this interval not displayed. INR No results for input(s): INR in the last 72 hours.   PTT   Lab Results   Component Value Date    APTT 27.0 10/10/2019         RADIOLOGY     (See actual reports for details)    ASSESSMENT/PLAN     Patient Active Problem List   Diagnosis    COPD (chronic obstructive pulmonary disease) ([address]    Atypical depressive disorder    Mitral valve disorder    Other hyperlipidemia    Temporomandibular joint disorder    Vitamin D deficiency    Tobacco abuse    Precordial pain    Adverse drug effect  Idiopathic hypotension    Sweating abnormality    Mild cognitive disorder    Idiopathic hypotension    Contusion of left back wall of thorax    Pain at rest    Inability to ambulate due to knee    Fall with injury    Bipolar disorder in full remission ([facility]    Acute cystitis without hematuria    Left elbow contusion    Mobility impaired    Tibial plateau fracture, left    Chronic pain of left knee    Obesity (BMI 35.0-39.9 without comorbidity)    Hyponatremia    Small cell lung cancer (HCC)    Pneumonia    Elevated LFTs    Hepatic cirrhosis (HCC)     Wheezing. Likely COPD  Extensive small cell lung cancer    Wean Solu-Medrol for 40 every 6 hours to 30 every 8 hours. Continue DuoNeb treatments every 4 hours while awake    On Zosyn and Vanco for now.   Follow for any signs of respiratory decline        Electronically signed by Arabella Turner MD on 10/20/2019 at 6:33 PM

## 2019-10-21 NOTE — PROGRESS NOTES
[facility]    Gastroenterology Daily Progress Note      Patient:   Fredrick Jeans   :    1955   Facility:   [facility]  Date:     10/21/2019  Consultant:   Corbin Marino CNP      SUBJECTIVE  59 y.o. female admitted 10/17/2019 with Small cell lung cancer ([facility] [C34.90]  Pneumonia [J18.9] and seen for elevated lft's. The pt was seen and examined. She is scheduled for MRI today to evaluate the biliary tree. Her bilirubin continues to rise. No c/o abdominal pain, nausea or itching. She was under the impression that she would be having a ERCP this morning. Autoimmune testing is still pending. Afebrile.         OBJECTIVE  Scheduled Meds:   vancomycin  1,750 mg Intravenous Q24H    methylPREDNISolone  30 mg Intravenous Q8H    vancomycin (VANCOCIN) intermittent dosing (placeholder)   Other RX Placeholder    busPIRone  10 mg Oral TID    famotidine  20 mg Oral BID    QUEtiapine  100 mg Oral Nightly    simvastatin  40 mg Oral Nightly    sodium chloride  1 g Oral TID WC    nicotine  1 patch Transdermal Daily    piperacillin-tazobactam (ZOSYN) 3.375 g in dextrose 5% IVPB extended infusion (mini-bag)  3.375 g Intravenous Q8H    ipratropium-albuterol  1 ampule Inhalation Q4H WA       Vital Signs:  /72   Pulse 106   Temp 97.9 °F (36.6 °C) (Oral)   Resp 17   Ht 5' 1\" (1.549 m)   Wt 206 lb 2.1 oz (93.5 kg)   SpO2 91%   BMI 38.95 kg/m²      Exam  General Appearance: alert and oriented to person, place and time, well-developed and well-nourished, in no acute distress  Skin: warm and dry, no rash or erythema, she is jaundice  Head: normocephalic and atraumatic  Eyes: pupils equal, round, and reactive to light, extraocular eye movements intact, scleral icterus present  ENT: hearing grossly normal bilaterally  Neck: neck supple and non tender without mass, no thyromegaly or thyroid nodules, no cervical lymphadenopathy   Pulmonary/Chest: clear and diminished to auscultation provider         Electronically signed by Hina Coats MD

## 2019-10-21 NOTE — PROGRESS NOTES
chronic respiratory failure with hypoxia/home O2 at 2 L  Bronchospasm and possible COPD  Extensive small cell lung cancer  Obstructive jaundice- MRI abdomen today     Plan:  Day #4 of antibiotic, check MRSA  Bronchodilator, wean steroids  GI following for possible ERCP and stent placement  PT/INR if unremarkable then may need DVT prophylaxis      Electronically signed by Washington Esqueda MD on 10/21/2019 at 3:21 PM

## 2019-10-21 NOTE — PROGRESS NOTES
performed 11/21/2016. HISTORY: ORDERING SYSTEM PROVIDED HISTORY: SOB TECHNOLOGIST PROVIDED HISTORY: SOB Reason for Exam: Pneumonia Acuity: Unknown Type of Exam: Unknown FINDINGS: There is chronic pulmonary change. There is no acute consolidation or effusion. Rounded opacity in the left midlung. There is no pneumothorax. The mediastinal structures are unremarkable. The upper abdomen is unremarkable. The extrathoracic soft tissues are unremarkable. Chronic pulmonary change without acute cardiopulmonary process. Rounded opacity in the left midlung and correlation with chest CT with contrast may be helpful in further characterization. Ct Chest W Contrast    Result Date: 9/27/2019  EXAMINATION: CT OF THE CHEST WITH CONTRAST 9/27/2019 11:33 am TECHNIQUE: CT of the chest was performed with the administration of intravenous contrast. Multiplanar reformatted images are provided for review. Dose modulation, iterative reconstruction, and/or weight based adjustment of the mA/kV was utilized to reduce the radiation dose to as low as reasonably achievable. COMPARISON: CT lung screening from 10/29/2018 HISTORY: ORDERING SYSTEM PROVIDED HISTORY: Pulmonary Nodule TECHNOLOGIST PROVIDED HISTORY: Reason for Exam: follow up pulmonary nodule Acuity: Unknown Type of Exam: Unknown FINDINGS: Mediastinum: Mediastinal lymphadenopathy, new from the prior CT. Lymph node conglomerate interposed between the left common carotid and the left subclavian artery measures 3.8 x 2.1 cm. Conglomerate prevascular lymphadenopathy increased from prior exam.  A rounded centrally necrotic lymph node (series 2, image 33) measures 3.2 x 2.2 cm. Subcarinal lymphadenopathy measuring 3.6 x 2.7 cm (series 2, image 51). Left hilar node (series 2, image 41) measures 2.4 x 1.7 cm. Cardiomegaly. Coronary artery disease. Lungs/pleura: Left upper lobe nodule measures 3.0 x 2.3 cm (series 4, image 34). Dependent changes.   Atelectasis in the left lower Acuity: Unknown Type of Exam: Unknown FINDINGS: Exam was submitted on October 1, 2019 10:20 a.m. for interpretation. No definite pneumothorax. Persistent left upper lobe opacity. Cardiomegaly. No pulmonary edema. No definite pneumothorax. Xr Chest Portable    Result Date: 10/1/2019  EXAMINATION: ONE XRAY VIEW OF THE CHEST 9/30/2019 12:56 pm COMPARISON: Earlier same day. HISTORY: ORDERING SYSTEM PROVIDED HISTORY: follow up SRIRAM lung biopsy TECHNOLOGIST PROVIDED HISTORY: follow up SRIRAM lung biopsy Reason for Exam: Post Lung biopsy Acuity: Acute Type of Exam: Initial FINDINGS: Heart size is at the upper limits of normal.  Post biopsy changes in the left suprahilar lung. No definite pneumothorax. No pleural effusion. The pulmonary vasculature is slightly prominent. Post biopsy changes in the left suprahilar lung. No definite pneumothorax. Ir Port Placement > 5 Years    Result Date: 10/10/2019  PROCEDURE: ULTRASOUND GUIDED VASCULAR ACCESS. FLUOROSCOPY GUIDED PLACEMENT OF A SUBCUTANEOUS PORT-A-CATH. 10/10/2019. HISTORY: ORDERING SYSTEM PROVIDED HISTORY: Primary malignant neoplasm of lung metastatic to other site, unspecified laterality (HCC) SEDATION: None FLUOROSCOPY DOSE AND TYPE OR TIME AND EXPOSURES: 1 minute 18 seconds; D  cGy cm2 TECHNIQUE: This procedure was performed by Dr. Jose Guzmán. Informed consent was obtained after a detailed explanation of the procedure including risks, benefits, and alternatives. All elements of maximal sterile barrier technique were utilized. Initial ultrasound evaluation shows the right internal jugular vein to be patent and accessible. Static image was obtained for the patient's medical record. Right neck and chest were prepped and draped in standard sterile fashion. The patient was pretreated with intravenous antibiotics. 1% lidocaine and lidocaine with epinephrine used for local anesthesia.   Using realtime ultrasound guidance the right internal jugular vein adjustment of the mA/kV was utilized to reduce the radiation dose to as low as reasonably achievable. CONTRAST: None SEDATION: None DESCRIPTION OF PROCEDURE: Informed consent was obtained after a detailed explanation of the procedure including risks, benefits, and alternatives. Universal protocol was observed. Patient was placed supine on the CT table. The left upper hemithorax was cleaned, prepped and draped in the usual sterile fashion. Local anesthetic was administered after CT localization of the area of interest. An 18 gauge core biopsy needle was advanced to the lesion located in the left upper lung under CT guidance. 3 core biopsy specimens were obtained and sent to pathology for further evaluation. The core biopsy needle was then removed and sterile dressing was applied. A very tiny pneumothorax was noted at the termination of the procedure. Patient tolerated procedure well. FINDINGS: Successful CT-guided core biopsy of lesion located in the upper lobe of the left lung. Tiny postprocedure pneumothorax. Successful CT-guided core biopsy of lesion in upper lobe of left lung with a tiny postprocedural pneumothorax. Follow-up with x-rays chest at 1 hour and 2 hours after termination of procedure. Mri Brain W Wo Contrast    Result Date: 10/1/2019  EXAMINATION: MRI OF THE BRAIN WITHOUT AND WITH CONTRAST  10/1/2019 8:17 pm TECHNIQUE: Multiplanar multisequence MRI of the head/brain was performed without and with the administration of intravenous contrast. COMPARISON: CT head 08/09/2016 HISTORY: ORDERING SYSTEM PROVIDED HISTORY: small cell lung cancer TECHNOLOGIST PROVIDED HISTORY: Reason for Exam: newly dx of lung ca Acuity: Acute Type of Exam: Ongoing FINDINGS: INTRACRANIAL STRUCTURES/VENTRICLES:  There is no acute infarct. No mass effect or midline shift. No evidence of an acute intracranial hemorrhage. The ventricles and sulci are normal in size and configuration for the patient's age.   Mild will need palliative stent too relieve obstruction.        Current Facility-Administered Medications   Medication Dose Route Frequency Provider Last Rate Last Dose    vancomycin (VANCOCIN) 1750 mg in dextrose 5% 500 mL IVPB  1,750 mg Intravenous Q24H Gardenia Dash MD   1,750 mg at 10/21/19 0537    0.9 % sodium chloride infusion   Intravenous Continuous Jose Boyd  mL/hr at 10/21/19 0042      morphine (PF) injection 2 mg  2 mg Intravenous Q4H PRN Duke Suarez MD   2 mg at 10/21/19 0856    methylPREDNISolone sodium (SOLU-MEDROL) injection 30 mg  30 mg Intravenous Q8H Onesimo Cabrera MD   30 mg at 10/21/19 0856    ondansetron (ZOFRAN-ODT) disintegrating tablet 4 mg  4 mg Oral Q6H PRN Duke Suarez MD   4 mg at 10/20/19 1835    vancomycin (VANCOCIN) intermittent dosing (placeholder)   Other RX Placeholder Gardenia Dash MD        perflutren lipid microspheres (DEFINITY) injection 2.2 mg  2 mL Intravenous ONCE PRN Neto Dorantes MD        busPIRone (BUSPAR) tablet 10 mg  10 mg Oral TID Gardenia Dash MD   Stopped at 10/21/19 0842    famotidine (PEPCID) tablet 20 mg  20 mg Oral BID Gardenia Dash MD   Stopped at 10/21/19 0842    QUEtiapine (SEROQUEL) tablet 100 mg  100 mg Oral Nightly Gardenia Dash MD   100 mg at 10/20/19 2030    simvastatin (ZOCOR) tablet 40 mg  40 mg Oral Nightly Gardenia Dash MD   40 mg at 10/20/19 2030    sodium chloride tablet 1 g  1 g Oral TID WC Gardenia Dash MD   Stopped at 10/21/19 0842    traMADol (ULTRAM) tablet 50 mg  50 mg Oral Q4H PRN Venbrendan Knapp MD   50 mg at 10/20/19 1308    nicotine (NICODERM CQ) 21 MG/24HR 1 patch  1 patch Transdermal Daily Gardenia Dash MD   Stopped at 10/21/19 0843    piperacillin-tazobactam (ZOSYN) 3.375 g in dextrose 5 % 50 mL IVPB extended infusion (mini-bag)  3.375 g Intravenous Q8H Gardenia Dash MD 12.5 mL/hr at 10/21/19 0856 3.375 g at 10/21/19 0856    ipratropium-albuterol

## 2019-10-21 NOTE — PROGRESS NOTES
24/7 [address] OT. Overall Orientation Status: Impaired  Orientation Level: Oriented to place, Oriented to person, Oriented to situation, Disoriented to time  Vision  Vision: Impaired  Vision Exceptions: Wears glasses at all times  Hearing  Hearing: Within functional limits  Social/Functional History  Lives With: Family(sister)  Type of Home: Trailer  Home Layout: One level  Home Access: Stairs to enter with rails  Entrance Stairs - Number of Steps: 4  Entrance Stairs - Rails: Both(too wide)  Bathroom Shower/Tub: Tub/Shower unit, Shower chair with back, Curtain  Bathroom Toilet: Standard(w/ toilet raiser and GB across from toilet)  [address]: Shower chair, Toilet raiser, Grab bars around toilet, Grab bars in shower, Hand-held shower  Bathroom Accessibility: Walker accessible  Home Equipment: 4 wheeled walker, Oxygen(uses O2 at 2 L 24/7, nebulizer)  Receives Help From: Family  ADL Assistance: Independent(prior to 3 weeks ago, Pt was IND)  Homemaking Assistance: Needs assistance(sister is primary for cooking, cleaning, laundry and grocery shopping)  Homemaking Responsibilities: No  Ambulation Assistance: Needs assistance(uses 4  w walker w/ assist limited distances, uses O2 at 2 L)  Transfer Assistance: Needs assistance  Active : No  Patient's  Info: Sister   Mode of Transportation: Family, Car  Occupation: On disability  Additional Comments: sister works first shift, states she has her friends as a support system to help when sister works  Pain Assessment  Pain Assessment: 0-10  Pain Level: 9  Pain Type: Acute pain  Pain Location: Abdomen  Pain Frequency: Intermittent  Clinical Progression: Gradually improving    Objective  Vision - Basic Assessment  Prior Vision: Wears glasses all the time   Cognition  Overall Cognitive Status: Impaired  Memory: Decreased recall of recent events, Decreased short term memory  Following Commands:  Follows one step commands with repetition  Safety Judgement: Decreased awareness of need for assistance  Awareness of Errors: Assistance required to identify errors made  Insights: Decreased awareness of deficits  Sequencing and Organization: Assistance required to identify errors made, Assistance required to generate solutions   Perception  Overall Perceptual Status: WFL  Sensation  Overall Sensation Status: WFL(denies)   ADL  Feeding: NPO  Grooming: Supervision, Setup  UE Bathing: Supervision, Setup  LE Bathing: Moderate assistance, Setup  UE Dressing: Stand by assistance, Setup  LE Dressing: Maximum assistance, Setup  Toileting: Moderate assistance, Setup  Additional Comments: ADL scores based on skilled observations and clinical reasoning unless otherwise noted. UE Function           LUE Strength  Gross LUE Strength: WFL  L Hand General: 4/5     LUE Tone: Normotonic     LUE AROM (degrees)  LUE AROM : WFL     Left Hand AROM (degrees)  Left Hand AROM: WFL  RUE Strength  Gross RUE Strength: WFL  R Hand General: 4/5      RUE Tone: Normotonic     RUE AROM (degrees)  RUE AROM : WFL     Right Hand AROM (degrees)  Right Hand AROM: WFL    Fine Motor Skills  Coordination  Movements Are Fluid And Coordinated: No  Coordination and Movement description: Left UE, Right UE, Decreased speed                           Mobility  Sit to Supine: Minimal assistance       Balance  Sitting Balance: Stand by assistance  Standing Balance: Contact guard assistance     Functional Mobility  Functional - Mobility Device: Rolling Walker  Activity: (from bedside chair to bed)  Assist Level: Contact guard assistance  Functional Mobility Comments: VCs for safety  Bed mobility  Sit to Supine: Minimal assistance  Scooting: Minimal assistance     Transfers  Sit to stand: Contact guard assistance  Stand to sit: Contact guard assistance  Transfer Comments: VCs for hand placement with Fair safety  Functional Activity Tolerance  Functional Activity Tolerance:  Tolerates 10 - 20 min exercise with multiple rests Self-Care / ADL, Balance Training, Functional Mobility Training, Endurance Training, Strengthening, Pain Management, Safety Education & Training, Patient/Caregiver Education & Training, Equipment Evaluation, Education, & procurement, Positioning       Equipment Recommendations  Equipment Needed: Yes  3-in-1 Commode: X  Grab bars: X  Gait Belt: X  Other: Hospital bed  OT Individual Minutes  Time In: 1013  Time Out: [address] [facility]  Minutes: 28    Electronically signed by Madison Sprague OT on 10/21/19 at 12:59 PM

## 2019-10-21 NOTE — PROGRESS NOTES
Physical Therapy  Facility/Department: [facility] PROGRESSIVE CARE  Daily Treatment Note  NAME: Mauricio Antunez  : 1955  MRN: [id number]    Date of Service: 10/21/2019    Discharge Recommendations:  Subacute/Skilled Nursing Facility        Assessment   Body structures, Functions, Activity limitations: Decreased functional mobility ; Decreased strength;Decreased endurance;Decreased balance  Assessment: continue per POC to maxmize potential for D/C. Pt would benefit from SNF at D/C due to generalized weakness and fatigue. Treatment Diagnosis: impaired mobility, weakness  Prognosis: Fair  REQUIRES PT FOLLOW UP: Yes  Activity Tolerance  Activity Tolerance: Patient limited by fatigue;Patient limited by endurance; Patient limited by pain     Patient Diagnosis(es): There were no encounter diagnoses. has a past medical history of Atypical depressive disorder, Cancer ([address] Hypotension, unspecified, Mitral valve disorders(424.0), Obstructive chronic bronchitis with exacerbation ([facility] [address] Other and unspecified hyperlipidemia, Other malaise and fatigue, Temporomandibular joint disorders, unspecified, Unspecified breast disorder, and Unspecified vitamin D deficiency. has a past surgical history that includes sunny and bso (cervix removed) (); Lung biopsy; and Hysterectomy, total abdominal.    Restrictions  Restrictions/Precautions  Restrictions/Precautions: Fall Risk(O2 at 3.5 L, port right chest, peripheral IV right forearm)  Required Braces or Orthoses?: No  Subjective   General  Chart Reviewed: Yes  Additional Pertinent Hx: hx lung CA w/ mets to bone  Response To Previous Treatment: Patient with no complaints from previous session. Family / Caregiver Present: Yes  Referring Practitioner: Dr. Naseem Dillon. Deb  Subjective  Subjective: Pt is sitting up on EOB conversing with family upon arrival. Pt requires Mod encouragment/education to participate in PT this date. PT reports increase pain and \"I had a bad night\".   PT agreeable to PT

## 2019-10-21 NOTE — CARE COORDINATION
ONGOING DISCHARGE PLAN:    Plan remains for LSW to continue to follow for Possible DC to SNF, to [facility], but per notes, they will not have a bed available this week. LSW is following for 2nd choice of Facility, family is hoping for a bed at [facility] closer to DC. If not, they will most likely return to home w/ VNS. [facility]. Hemoc is following for Lung CA w/ mets, Pt. Was planning to start Chemo. GI is following, Elevated LFT's, MRCP, Per notes, May need ERCP w/ Stenting. Will have MRI first.       PT rec SNF, OT rec 24 hour supervision or assist, Home with Home health OT. Will continue to follow for additional discharge needs.     Electronically signed by Ebonie Jackson RN on 10/21/2019 at 3:22 PM

## 2019-10-21 NOTE — PROGRESS NOTES
Dr. Demetri Jefferson notified of patient's NA being  10/20 and dropping to 129. RN updated that patient is NPO for MRI today and has NS running 100ml/hr.  Electronically signed by Katheryn Chandler RN on 10/21/2019 at 10:00 AM

## 2019-10-22 NOTE — PLAN OF CARE
Received a call from Dr. Germain Oliva  Attempt was made to do ERCP  However while doing the procedure it was noted that patient's stomach was full and  The procedure was aborted  Patient is having some respiratory distress  Patient is DNR CC  Has advanced malignancy

## 2019-10-22 NOTE — OP NOTE
cannulate the pancreatic duct which looked normal, she had a very tiny papilla which kept moving try to cannulate it was like a moving target because of the fast breathing this patient is doing in addition to the tachycardia, in addition to the motility of the GI tract although we gave her 1-1/2 mg total of glucagon but she kept having numerous contractions for which cannulation was supposed to be done between the contractions. The duodenum also was full with solid food. I left the wire in the pancreatic duct and try to cannulate with a different wire and with that the wire went up to the liver site for which we most likely were in the common bile duct, but with injecting dye the biliary tract would not show clearly, there was a collection contained which could be the common bile duct but I could not confirm that and I want to avoid complication in this patient for which we elected to abort this exam  I did not do any sphincterotomy  And pancreatic duct drained very well for which we did not feel that we needed the stent in the pancreatic duct  Patient abdomen was distended because of the air inflated    After the procedure the patient was having respiratory issues, anesthesia was working on her, then and the patient is getting respiratory was called respiratory treatment at this time, and she will be placed on BiPAP. I went out and talked to the family again they want to respect her wishes for no resuscitation no intubation no code, I told him she is now doing well and she might end of crashing if we do not do that they understand and they are prepared for the worst with her terminal diagnosis and they want to respect her wishes. I did call Dr. Avel Vaughn over the phone discussed the situation with him, he recommended we consult ICU team to handle her conservatively and put her in the ICU intermediate, but respect her wishes for no intubation no resuscitation no code.       Discussed with the family again and update them on all above they are agreeable  In fact they are thinking if the patient take a course to the worst she has to make her comfort care with her terminal diagnosis    Using the a wire guided approach with the sphincterotome, the CBD was cannulated and a cholangiogram was performed. Findings:      Please see above      Post sedation note : The patient's SPO2 remained above 90% throughout the procedure. the vital signs remained stable , and no immediate complication form the procedure noted, patient will be ready for d/c when criteria is met .     Plan: Please see above    Electronically signed by   Sue Riley   on 10/22/2019 at 4:21 PM

## 2019-10-22 NOTE — PROGRESS NOTES
Pharmacy Vancomycin Consult     Vancomycin Day: 5  Current Dosin mg qd    Temp max:  97.8    Recent Labs     10/21/19  0752 10/22/19  0524   BUN 46* 56*       Recent Labs     10/21/19  0752 10/22/19  0524   CREATININE 1.29* 1.40*       Recent Labs     10/21/19  0531 10/22/19  0524   WBC 18.2* 18.6*         Intake/Output Summary (Last 24 hours) at 10/22/2019 4751  Last data filed at 10/22/2019 0000  Gross per 24 hour   Intake 200 ml   Output --   Net 200 ml       Culture Date      Source                       Results  See micro report    Ht Readings from Last 1 Encounters:   10/17/19 5' 1\" (1.549 m)        Wt Readings from Last 1 Encounters:   10/21/19 206 lb 2.1 oz (93.5 kg)         Body mass index is 38.95 kg/m². Estimated Creatinine Clearance: 42 mL/min (A) (based on SCr of 1.4 mg/dL (H)). Trough: 22.3 mcg/ml    Assessment/Plan:  Trough level is slightly above target range. Will decrease dose to 1500 mg daily and recheck level after 2 doses.

## 2019-10-22 NOTE — FLOWSHEET NOTE
10/22/19 1837   Provider Notification   Reason for Communication Review case;Patient request   Provider Name Dr. Calista Gracia   Provider Notification Physician   Method of Communication Secure Message   Response No new orders   Notification Time 1927     RN notified Dr. Calista Gracia that Pt is refusing Bipap at this time and is wanting code status changed to [facility]. Dr. Spencer Keys is currently talking to pt family who confirms DNR change. Dr. Rex Glalegos just rounded and is ok with pt going on NC. No new orders at this time.

## 2019-10-22 NOTE — CARE COORDINATION
ONGOING DISCHARGE PLAN:    LSW following for rehab at [facility]    Patient having ERCP today    Per hemoc notes - had a long discussion with the patient, unfortunately most of her condition is  secondary to progressive cancer. Plan chemo once stable. Likely with carboplatin and possibly taxol depending on LFT     Possible transfer to [facility] to start palliative  Chemo. Will continue to follow for additional discharge needs.     Electronically signed by Joanie Eduardo RN on 10/22/2019 at 2:50 PM

## 2019-10-22 NOTE — PROGRESS NOTES
2ml red     Culture NO GROWTH 13 HOURS    CULTURE BLOOD #2    Collection Time: 10/21/19  9:53 AM   Result Value Ref Range    Specimen Description . BLOOD     Special Requests  left wrist 4ml purple.  2ml red     Culture NO GROWTH 13 HOURS    Protime-INR    Collection Time: 10/21/19  4:07 PM   Result Value Ref Range    Protime 21.9 (H) 11.8 - 14.6 sec    INR 1.9    VANCOMYCIN, TROUGH    Collection Time: 10/22/19  5:24 AM   Result Value Ref Range    Vancomycin Tr 22.3 (HH) 10.0 - 20.0 ug/mL    Vancomycin Trough Dose amount 1750MG     Vancomycin Trough Date last dose 10,212,019     Vancomycin Trough Time last dose 537    CBC    Collection Time: 10/22/19  5:24 AM   Result Value Ref Range    WBC 18.6 (H) 3.5 - 11.0 k/uL    RBC 3.65 (L) 4.0 - 5.2 m/uL    Hemoglobin 10.3 (L) 12.0 - 16.0 g/dL    Hematocrit 31.6 (L) 36 - 46 %    MCV 86.6 80 - 100 fL    MCH 28.1 26 - 34 pg    MCHC 32.5 31 - 37 g/dL    RDW 18.6 (H) 11.5 - 14.9 %    Platelets 552 742 - 685 k/uL    MPV 8.4 6.0 - 12.0 fL    NRBC Automated NOT REPORTED per 100 WBC   Comprehensive Metabolic Panel w/ Reflex to MG    Collection Time: 10/22/19  5:24 AM   Result Value Ref Range    Glucose 167 (H) 70 - 99 mg/dL    BUN 56 (H) 8 - 23 mg/dL    CREATININE 1.40 (H) 0.50 - 0.90 mg/dL    Bun/Cre Ratio NOT REPORTED 9 - 20    Calcium 7.7 (L) 8.6 - 10.4 mg/dL    Sodium 132 (L) 135 - 144 mmol/L    Potassium 4.6 3.7 - 5.3 mmol/L    Chloride 98 98 - 107 mmol/L    CO2 16 (L) 20 - 31 mmol/L    Anion Gap 18 (H) 9 - 17 mmol/L    Alkaline Phosphatase 1,343 (H) 35 - 104 U/L     (H) 5 - 33 U/L     (H) <32 U/L    Total Bilirubin 11.49 (H) 0.3 - 1.2 mg/dL    Total Protein 5.7 (L) 6.4 - 8.3 g/dL    Alb 2.7 (L) 3.5 - 5.2 g/dL    Albumin/Globulin Ratio NOT REPORTED 1.0 - 2.5    GFR Non- 38 (L) >60 mL/min    GFR  46 (L) >60 mL/min    GFR Comment          GFR Staging NOT REPORTED        Recent Labs     10/22/19  0524 10/21/19  1607  10/10/19  0845   HGB HISTORY: ORDERING SYSTEM PROVIDED HISTORY: RENAL FAILURE, ACUTE (KIDNEY INJURY) FINDINGS: Right kidney measures 12.3 x 4.1 x 4.9 cm without hydronephrosis. Cortical thickness measures 14.2 mm. Left kidney measures 11.9 x 6.3 x 3.7 cm without hydronephrosis. Cortical thickness measures 13.7 mm. Echogenicity appears grossly intact bilaterally. 8-9 mm echogenic focus in the right kidney suspicious for nonobstructing calculus. No perirenal fluid collections. Nonobstructing right renal calculus. Otherwise unremarkable renal ultrasound. Ct Abdomen Pelvis W Iv Contrast Additional Contrast? Oral    Result Date: 9/28/2019  EXAMINATION: CT OF THE ABDOMEN AND PELVIS WITH CONTRAST 9/28/2019 11:16 am TECHNIQUE: CT of the abdomen and pelvis was performed with the administration of intravenous contrast. Multiplanar reformatted images are provided for review. Dose modulation, iterative reconstruction, and/or weight based adjustment of the mA/kV was utilized to reduce the radiation dose to as low as reasonably achievable. COMPARISON: CT chest 09/27/2019 HISTORY: ORDERING SYSTEM PROVIDED HISTORY: liver lesions, lung mass TECHNOLOGIST PROVIDED HISTORY: Reason for Exam: liver lesions, lung mass Acuity: Unknown Type of Exam: Unknown Additional signs and symptoms: PTY STATES RECENT DIAGNOSIS OF LUNG CANCER, R/O METS Relevant Medical/Surgical History: SURGERIES-IRIS AND BSO FINDINGS: Lower Chest: Bibasilar dependent atelectasis. Partially visualized enlarged subcarinal left hilar lymph nodes. Multiple mildly prominent pericardial lymph nodes. Enlarged distal paraesophageal lymph nodes. Organs: Innumerable hypodense lesions in the liver consistent with metastatic disease. 1.2 cm well-circumscribed hypodense lesion along the inferior gallbladder fossa in the right hepatic lobe which is consistent with simple cyst.  Diffusely nodular hepatic contour. Spleen, pancreas, adrenal glands and left kidney appear unremarkable.   2 ACCESS. FLUOROSCOPY GUIDED PLACEMENT OF A SUBCUTANEOUS PORT-A-CATH. 10/10/2019. HISTORY: ORDERING SYSTEM PROVIDED HISTORY: Primary malignant neoplasm of lung metastatic to other site, unspecified laterality (HCC) SEDATION: None FLUOROSCOPY DOSE AND TYPE OR TIME AND EXPOSURES: 1 minute 18 seconds; D  cGy cm2 TECHNIQUE: This procedure was performed by Dr. Jammie Blanco. Informed consent was obtained after a detailed explanation of the procedure including risks, benefits, and alternatives. All elements of maximal sterile barrier technique were utilized. Initial ultrasound evaluation shows the right internal jugular vein to be patent and accessible. Static image was obtained for the patient's medical record. Right neck and chest were prepped and draped in standard sterile fashion. The patient was pretreated with intravenous antibiotics. 1% lidocaine and lidocaine with epinephrine used for local anesthesia. Using realtime ultrasound guidance the right internal jugular vein was accessed with a micropuncture needle and guidewire advanced centrally. Transitional dilator was placed which allowed placement of an 0.035 guidewire which had its tip directed under fluoroscopy to the inferior vena cava. Peel-away sheath was placed over the guidewire. 8 Occitan catheter was placed over the guidewire through the peel-away sheath. The guidewire and sheath were subsequently removed. A 1 inch transverse incision was made in the right infraclavicular region and a subcutaneous pocket created using sharp and blunt dissection. The pocket is shown to be clean and dry after irrigating with antibiotic solution. The catheter was pulled from the venotomy site to the pocket through a subcutaneous tunnel. Under fluoroscopy the catheter was trimmed to the appropriate length such that the catheter tip is in the proximal right atrium. The catheter is connected to the port and the system is shown to be patent without evidence of leak.   The 10 mg Oral TID Kurt Deal MD   10 mg at 10/21/19 1946    QUEtiapine (SEROQUEL) tablet 100 mg  100 mg Oral Nightly Kurt Deal MD   100 mg at 10/21/19 1946    simvastatin (ZOCOR) tablet 40 mg  40 mg Oral Nightly Kurt Deal MD   40 mg at 10/21/19 1946    sodium chloride tablet 1 g  1 g Oral TID WC Kurt Deal MD   1 g at 10/21/19 1619    traMADol (ULTRAM) tablet 50 mg  50 mg Oral Q4H PRN Deepak Knapp MD   50 mg at 10/21/19 2349    nicotine (NICODERM CQ) 21 MG/24HR 1 patch  1 patch Transdermal Daily Kurt Deal MD   1 patch at 10/22/19 0820    piperacillin-tazobactam (ZOSYN) 3.375 g in dextrose 5 % 50 mL IVPB extended infusion (mini-bag)  3.375 g Intravenous Q8H Kurt Deal MD   Stopped at 10/22/19 0529    ipratropium-albuterol (DUONEB) nebulizer solution 1 ampule  1 ampule Inhalation Q4H WA Kurt Deal MD   1 ampule at 10/22/19 0728          Megan Cyr MD  10/22/2019  9:32 AM  .Mendy Israel

## 2019-10-22 NOTE — CARE COORDINATION
[address] Transitions Interview     10/22/2019    Patient: Charley Biswas Patient : 1955   MRN: [id number]  Reason for Admission: copd  RARS: Readmission Risk Score: 29         Patient down getting ERCP, will continue to follow//JU      Readmission Risk  Patient Active Problem List   Diagnosis    COPD (chronic obstructive pulmonary disease) ([facility] [address]    Atypical depressive disorder    Mitral valve disorder    Other hyperlipidemia    Temporomandibular joint disorder    Vitamin D deficiency    Tobacco abuse    Precordial pain    Adverse drug effect    Idiopathic hypotension    Sweating abnormality    Mild cognitive disorder    Idiopathic hypotension    Contusion of left back wall of thorax    Pain at rest    Inability to ambulate due to knee    Fall with injury    Bipolar disorder in full remission ([facility] [address]    Acute cystitis without hematuria    Left elbow contusion    Mobility impaired    Tibial plateau fracture, left    Chronic pain of left knee    Obesity (BMI 35.0-39.9 without comorbidity)    Hyponatremia    Small cell lung cancer (HCC)    Pneumonia    Elevated LFTs    Hepatic cirrhosis [facility])       Inpatient Assessment  Care Transitions Summary    Care Transitions Inpatient Review  Medication Review  Do you have all of your prescriptions and are they filled?:  No   How often do you have difficulty taking your medications?:  I always take them as prescribed. Housing Review  Who do you live with?:  Roommate/Housemate  Are you an active caregiver in your home?:  No  Does the person that you care for see a [facility] PCP?:  No  Social Support  Durable Medical Equipment  Patient DME:  Hellen Wilks  Functional Review  Ability to seek help/take action for Emergent/Urgent situations i.e. fire, crime, inclement weather or health crisis. :  Independent  Ability handle personal hygiene needs (bathing/dressing/grooming):   Independent  Ability to manage medications: Independent  Ability to prepare food:  Independent  Ability to maintain home (clean home, laundry): Independent  Ability to drive and/or has transportation:  Dependent  Ability to do shopping:  Independent  Ability to manage finances: Independent  Is patient able to live independently?:  Yes  Hearing and Vision  Care Transitions Interventions         Follow Up  Future Appointments   Date Time Provider Caitlyn Chan   10/23/2019  2:30 PM [facility]   10/29/2019  8:40 AM Joce Alonso MD [address] [place] [address]   10/29/2019  9:00 AM [facility] MED ONC CHAIR 01 [facility] [place]   10/30/2019  9:00 AM [facility] MED ONC CHAIR 01 [facility] [place]   10/31/2019  2:00 PM [facility] MED ONC CHAIR 13 [facility] [place]   11/14/2019  1:00 PM Eduardo Acuña [id number]   11/15/2019  3:30 PM Riley Joshi MD [address] Maintenance  There are no preventive care reminders to display for this patient.     Earnie Simmonds, RN

## 2019-10-22 NOTE — PROGRESS NOTES
Today's Date: 10/21/2019  Patient Name: Kera Moyer  Date of admission: 10/17/2019  7:58 PM  Patient's age: 59 y.o., 1955  Admission Dx: Small cell lung cancer ([facility] [address] [C34.90]  Pneumonia [J18.9]    Reason for Consult: lung ca  Requesting Physician: Antonette Dumont MD    CHIEF COMPLAINT:  No chief complaint on file. Jaundice    History Obtained From:  Pt and chart  INTERIM HISTORY  Patient is seen and evaluated. MRCP showed extensive liver mets. I do not see obstruction but the images are difficult to assess. She is fatigued. No nausea or vomiting. She is still deeply jaundiced. HISTORY OF PRESENT ILLNESS:    This is a 70-year-old female with a new diagnosis of extensive stage small cell lung cancer was planning to start palliative chemotherapy as outpatient was admitted with worsening jaundice and on admission her bilirubin noted to be 8.1 and alkaline phosphatase 1348 and elevated transaminases. 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 Past Surgical History:   has a past surgical history that includes sunny and bso (cervix removed) (1995); Lung biopsy; and Hysterectomy, total abdominal.     Medications:  Reviewed in Epic  Family History: family history includes Asthma in her father; Cancer in her mother and sister; Stroke in her sister. REVIEW OF SYSTEMS:    Constitutional: No fever or chills.  No night sweats, no weight loss , weakness, bilateral tremor. Eyes: No eye discharge, double vision, or eye pain   HEENT: negative for sore mouth, sore throat, hoarseness and voice change   Respiratory: negative for cough , sputum,++ dyspnea, wheezing, hemoptysis, chest pain   Cardiovascular: negative for chest pain, ++dyspnea, palpitations, orthopnea, PND   Gastrointestinal: negative for nausea, vomiting, diarrhea, she has abdominal pain and  constipation  Genitourinary: negative for frequency, dysuria, nocturia, urinary incontinence, and hematuria   Integument: negative for rash, skin lesions, bruises.    Hematologic/Lymphatic: negative for easy bruising, bleeding, lymphadenopathy, or petechiae   Endocrine: negative for heat or cold intolerance,weight changes, change in bowel habits and hair loss   Musculoskeletal: negative for myalgias, arthralgias, pain, joint swelling,and bone pain   Neurological: negative for headaches, dizziness, seizures, weakness, numbness    PHYSICAL EXAM:      /67   Pulse 113   Temp 97.7 °F (36.5 °C) (Oral)   Resp 20   Ht 5' 1\" (1.549 m)   Wt 206 lb 2.1 oz (93.5 kg)   SpO2 94%   BMI 38.95 kg/m²    Temp (24hrs), Av.9 °F (36.6 °C), Min:97.7 °F (36.5 °C), Max:98.2 °F (36.8 °C)    General appearance - ill appearing, no in pain or distress , very weak, jaundiced  Mental status - alert and cooperative   Eyes -++icterus pupils equal and reactive, extraocular eye movements intact   Ears - bilateral TM's and external ear canals normal   Mouth - mucous membranes moist, pharynx normal without lesions   Neck - supple, no significant adenopathy   Lymphatics - no palpable lymphadenopathy, no hepatosplenomegaly   Chest - clear to auscultation, no wheezes, rales or rhonchi, symmetric air entry   Heart - normal rate, regular rhythm, normal S1, S2, no murmurs  Abdomen - soft, nontender, nondistended, no masses or organomegaly   Neurological - alert, oriented, normal speech, no focal findings or movement disorder noted   Musculoskeletal - no joint tenderness, deformity or swelling   Extremities - peripheral pulses normal, no pedal edema, no clubbing or cyanosis   Skin - ++jaundice    DATA:    Labs:   CBC:   Recent Labs     10/20/19  0517 10/21/19  0531   WBC 18.5* 18.2*   HGB 11.0* 10.4*   HCT 33.3* 32.0*    196     BMP:   Recent Labs     10/20/19  0517 10/21/19  0752   * 129*   K 4.1 4.4   CO2 19* 18*   BUN 36* 46*   CREATININE 1.40* 1.29*   LABGLOM 38* 42*   GLUCOSE 164* 219*     Lab Results   Component Value Date     (H) 10/21/2019     (H) 10/21/2019    ALKPHOS 1,381 (H) 10/21/2019    BILITOT 10.13 (H) 10/21/2019       PT/INR:   Recent Labs     10/21/19  1607   PROTIME 21.9*   INR 1.9       IMAGING DATA:  Reviewed  Ct abd pelvis 9/28  Impression   1. Innumerable hypodense lesions in the liver consistent with metastatic   disease. 2. Upper abdominal lymphadenopathy consistent with metastatic disease as   described. 3. Partially visualized enlarged subcarinal left hilar lymph nodes.  Multiple   mildly prominent pericardial lymph nodes.  Enlarged distal paraesophageal   lymph nodes.  Findings consistent with metastatic disease please see recent   chest CT for further detail. 4. Diffusely nodular hepatic contour suggesting cirrhosis. 5. Moderate size hiatal hernia. 6. Right renal lower pole nephrolithiasis. 7. Few additional incidental/chronic findings as detailed above. Primary Problem  <principal problem not specified>    Active Hospital Problems    Diagnosis Date Noted    Elevated LFTs [R94.5]     Hepatic cirrhosis (HCC) [K74.60]     Pneumonia [J18.9] 10/18/2019     IMPRESSION:   1. Extensive stage small cell lung cancer with extensive liver metastasis, bone metastasis patient is following with Dr. Esperanza Mckeon and was planning to start palliative chemotherapy  2. Hyperbilirubinemia/jaundice: Likely due to advanced liver metastasis .    3. Elevated alkaline phosphatase and transaminase, likely secondary to metastatic disease  4. Shortness of breath, likely secondary to extensive stage small cell lung cancer and pneumonia  5. Pneumonia in immunocompromised patient    RECOMMENDATIONS:  1. I had a long discussion with the patient, unfortunately most of her condition is  secondary to progressive cancer. 2. GI input appreciated  3. Await completion of GI workup  4. Plan chemo once stable. Likely with carboplatin and possibly taxol depending on LFT         Discussed with patient and Nurse. Thank you for asking us to see this patient.   [address] [place] [address]

## 2019-10-22 NOTE — PLAN OF CARE
Problem: Falls - Risk of:  Goal: Will remain free from falls  Description  Will remain free from falls  Outcome: Ongoing   Pt assessed as a fall risk this shift. Remains free from falls and accidental injury at this time. Fall precautions in place, including falling star sign and fall risk band on pt. Floor free from obstacles, and bed is locked and in lowest position. Adequate lighting provided. Pt encouraged to call before getting OOB for any need. Bed alarm activated. Will continue to monitor needs during hourly rounding, and reinforce education on use of call light.     Problem: Pain:  Goal: Pain level will decrease  Description  Pain level will decrease  Outcome: Ongoing   Pt receiving morphine prn   Problem: Musculor/Skeletal Functional Status  Goal: Highest potential functional level  Outcome: Ongoing

## 2019-10-22 NOTE — FLOWSHEET NOTE
10/22/19 1838   Provider Notification   Reason for Communication Review case;Patient request   Provider Name Dr. Reese Roth   Provider Notification Physician   Method of Communication Secure Message   Response No new orders   Notification Time 1847     RN notified Dr. Reese Roth thatPt is refusing Bipap at this time and is wanting code status changed to [facility]. Dr. Tirso Christie is currently talking to pt family who confirms DNR change. Pt came to ICU without an ICU order. Dr. Nathalie Dudley just rounded and is ok with pt going on NC. No new orders at this time.

## 2019-10-23 NOTE — FLOWSHEET NOTE
10/1955   Provider Notification   Reason for Communication Review case   Provider Name Chelsie Cuia   Provider Notification Nurse Practitioner   Method of Communication Face to face   Response At bedside   Notification Time 1950     Orders to change morphine to q2h PRN and give 500 ml NS fluid bolus over one hour.

## 2019-10-23 NOTE — PROGRESS NOTES
[facility]    Gastroenterology Daily Progress Note      Patient:   Charley Biswas   :    1955   Facility:   [facility]   Date:     10/23/2019  Consultant:   Rolan Diamond CNP      SUBJECTIVE  59 y.o. female admitted 10/17/2019 with Small cell lung cancer ([facility] [address] [C34.90]  Pneumonia [J18.9] and seen for elevated lfts. The pt was seen and examined. She is s/p attempted ercp yesterday but developed respiratory distress and the procedure was aborted. She is alert and oriented in mild respiratory distress. She denies abdominal pain and nausea. LFT\"s remain elevated. Low grade fever this am. She is now a [facility].         OBJECTIVE  Scheduled Meds:   metoprolol  2.5 mg Intravenous Q6H    vancomycin  1,500 mg Intravenous Q24H    famotidine  20 mg Oral Daily    methylPREDNISolone  30 mg Intravenous Q8H    vancomycin (VANCOCIN) intermittent dosing (placeholder)   Other RX Placeholder    busPIRone  10 mg Oral TID    QUEtiapine  100 mg Oral Nightly    simvastatin  40 mg Oral Nightly    sodium chloride  1 g Oral TID WC    nicotine  1 patch Transdermal Daily    piperacillin-tazobactam (ZOSYN) 3.375 g in dextrose 5% IVPB extended infusion (mini-bag)  3.375 g Intravenous Q8H    ipratropium-albuterol  1 ampule Inhalation Q4H WA       Vital Signs:  /74   Pulse 108   Temp 99.1 °F (37.3 °C) (Oral)   Resp 13   Ht 5' 1\" (1.549 m)   Wt 206 lb 2.1 oz (93.5 kg)   SpO2 92%   BMI 38.95 kg/m²      Exam  General Appearance: ill appearing with mild respiratory distress alert and oriented to person, place and time, well-developed and well-nourished  Skin: warm and dry, no rash or erythema, she is jaundice  Head: normocephalic and atraumatic  Eyes: pupils equal, round, and reactive to light, extraocular eye movements intact, scleral icterus present  ENT: hearing grossly normal bilaterally  Neck: neck supple and non tender without mass, no thyromegaly or thyroid nodules, no cervical lymphadenopathy   Pulmonary/Chest: coarse and diminished to auscultation bilaterally- no wheezes, rales or rhonchi, normal air movement, short of breath with movement on nasal cannula oxygen  Cardiovascular: tachycardic  rate, regular rhythm, normal S1 and S2, no murmurs, rubs, clicks or gallops, distal pulses intact, no carotid bruits  Abdomen: soft, non-tender, non-distended, normal bowel sounds, no masses or organomegaly obese  Extremities: no cyanosis, clubbing plus 1  Edema to both lower legs  Musculoskeletal: normal range of motion, no joint swelling, deformity or tenderness  Neurologic: no cranial nerve deficit and muscle strength normal      Lab and Imaging Review     CBC  Recent Labs     10/21/19  0531 10/22/19  0524 10/23/19  0452   WBC 18.2* 18.6* 17.7*   HGB 10.4* 10.3* 10.5*   HCT 32.0* 31.6* 33.7*   MCV 86.6 86.6 89.7    184 182       BMP  Recent Labs     10/21/19  0752 10/22/19  0524 10/23/19  0452   * 132* 134*   K 4.4 4.6 5.1   CL 96* 98 103   CO2 18* 16* 13*   BUN 46* 56* 63*   CREATININE 1.29* 1.40* 1.34*   GLUCOSE 219* 167* 156*   CALCIUM 8.0* 7.7* 7.1*       LFTS  Recent Labs     10/21/19  0752 10/22/19  0524 10/23/19  0452   ALKPHOS 1,381* 1,343* 1,185*   * 278* 317*   * 296* 406*   PROT 5.8* 5.7* 5.5*   BILITOT 10.13* 11.49* 12.22*   LABALBU 2.9* 2.7* 2.7*         PT/INR  Recent Labs     10/21/19  1607 10/22/19  1040   PROTIME 21.9* 22.7*   INR 1.9 2.0   Results for Ruba Herzog (MRN [id number]) as of 10/20/2019 10:55    Ref. Range 10/20/2019 08:24   GGT Latest Ref Range: 5 - 36 U/L 2,344 (H)   Results for Ruba Montelongoin (MRN [id number]) as of 10/20/2019 10:55    Ref.  Range 10/20/2019 05:17   Ferritin Latest Ref Range: 13 - 150 ug/L 474 (H)   Iron Latest Ref Range: 37 - 145 ug/dL 96   Iron Saturation Latest Ref Range: 20 - 55 % 34   UIBC Latest Ref Range: 112 - 347 ug/dL 183   TIBC Latest Ref Range: 250 - 450 ug/dL 279   Folate Latest Ref Range: >4.8 ng/mL 7.5   Vitamin B-12 Latest Ref Range: 232 - 1245 pg/mL 1262 (H)   Results for Leobardo Zamora (MRN [id number]) as of 10/20/2019 10:55    Ref. Range 9/26/2019 11:56   Hep A IgM Latest Ref Range: NONREACTIVE  NONREACTIVE   Hepatitis B Surface Ag Latest Ref Range: NONREACTIVE  NONREACTIVE   Hepatitis C Ab Latest Ref Range: NONREACTIVE  NONREACTIVE   Hep B Core Ab, IgM Latest Ref Range: NONREACTIVE  NONREACTIVE    Results for Leobardo Zamora (MRN [id number]) as of 10/21/2019 09:55    Ref. Range 10/20/2019 08:24   AFP (Alpha Fetoprotein) Latest Ref Range: <8.4 ug/L 3.9   Results for Leobardo Zamora (MRN [id number]) as of 10/21/2019 09:55    Ref. Range 10/20/2019 08:24   A-1 Antitrypsin Latest Ref Range: 90 - 200 mg/dL 228 (H)   Results for Leobardo Zamora (MRN [id number]) as of 10/21/2019 09:55    Ref. Range 10/20/2019 08:24   Ceruloplasmin Latest Ref Range: 16 - 45 mg/dL 37   Results for Leobardo Zamora (MRN [id number]) as of 10/21/2019 09:55    Ref. Range 10/20/2019 08:24   IgA Latest Ref Range: 70 - 400 mg/dL 312    Results for Leobardo Zamora (MRN [id number]) as of 10/23/2019 10:15   Ref. Range 10/20/2019 08:24   CMV IgG Latest Ref Range: <0.9  0.1   Results for Leobardo Zamora (MRN [id number]) as of 10/23/2019 10:15   Ref. Range 10/20/2019 08:24   CMV IgM Latest Ref Range: <0.9  0.3   Results for Leobardo Zamora (MRN [id number]) as of 10/23/2019 10:15   Ref. Range 10/20/2019 08:24   EBV EA IgG Latest Ref Range: <100 U/mL 31   Results for Leobardo Zamora (MRN [id number]) as of 10/23/2019 10:15   Ref. Range 10/20/2019 08:24   EBV Nuclear Ag Ab Latest Ref Range: <100 U/mL 755 (H)   Results for Leobardo Zamora (MRN [id number]) as of 10/23/2019 10:15   Ref. Range 10/20/2019 08:24   Anti-Mitochondrial Antibody Latest Ref Range: 0.0 - 20.0 Units 2.4   Results for Leobardo Zamora (MRN [id number]) as of 10/23/2019 10:15   Ref. Range 10/20/2019 08:24   Smooth Muscle Ab Latest Ref Range: 0 - 19 Units 5   Results for Leobardo Zamora (MRN [id number]) as of 10/23/2019 10:15   Ref.  Range liver consistent with metastatic   disease.  1.2 cm well-circumscribed hypodense lesion along the inferior   gallbladder fossa in the right hepatic lobe which is consistent with simple   cyst.  Diffusely nodular hepatic contour.       Spleen, pancreas, adrenal glands and left kidney appear unremarkable.  2 non   obstructing calculi lower pole right kidney both of which measure   approximately 5 mm.       GI/Bowel: No bowel obstruction or inflammation.  Normal appendix.  Moderate   size hiatal hernia.       Pelvis: Residual contrast in the bladder from recent contrast-enhanced chest   CT examination.  Hysterectomy.       Peritoneum/Retroperitoneum: Upper abdominal lymphadenopathy most prominent in   the portal caval and gastrohepatic regions including areas of conglomerate   bulky adenopathy measuring up to approximately 2 cm in short axis.       Normal caliber abdominal aorta with moderate infrarenal atherosclerosis no   abdominal ascites or free air.       Bones/Soft Tissues: Hypertrophic degenerative change in the visualized   thoracic spine.  No suspicious osseous lesion or acute osseous abnormality.           Impression   1. Innumerable hypodense lesions in the liver consistent with metastatic   disease. 2. Upper abdominal lymphadenopathy consistent with metastatic disease as   described. 3. Partially visualized enlarged subcarinal left hilar lymph nodes.  Multiple   mildly prominent pericardial lymph nodes.  Enlarged distal paraesophageal   lymph nodes.  Findings consistent with metastatic disease please see recent   chest CT for further detail. 4. Diffusely nodular hepatic contour suggesting cirrhosis. 5. Moderate size hiatal hernia. 6. Right renal lower pole nephrolithiasis.    7. Few additional incidental/chronic findings as detailed above.         Attempted ercp Dr Cynthia Cardona 10/22/19  Description of Procedure:  Prior to the procedure, a history and physical exam was performed and informed consent was this time, and she will be placed on BiPAP. I went out and talked to the family again they want to respect her wishes for no resuscitation no intubation no code, I told him she is now doing well and she might end of crashing if we do not do that they understand and they are prepared for the worst with her terminal diagnosis and they want to respect her wishes. I did call Dr. Keaton Fuller over the phone discussed the situation with him, he recommended we consult ICU team to handle her conservatively and put her in the ICU intermediate, but respect her wishes for no intubation no resuscitation no code.        Discussed with the family again and update them on all above they are agreeable  In fact they are thinking if the patient take a course to the worst she has to make her comfort care with her terminal diagnosis     Using the a wire guided approach with the sphincterotome, the CBD was cannulated and a cholangiogram was performed.          ASSESSMENT/PLAN:  1. Elevated lft's with known metastatic lung cancer to liver and bone  -aborted ercp yesterday and pt is now [facility]  -oncology following    2. Possible cirrhosis per ct abdomen  -autoimmune testing negative  -2gm low salt diet as tolerated     3.anemia; no overt bleeding  -anemia panel negative for iron deficiency, may be related to cancer  -trend h/h     4. pneumonia; antibiotics per primary service      Case d/w Dr Aleja Suggs signing off      This plan was formulated in collaboration with Dr. Daly Roberts . Electronically signed by: AUSTEN Caro CNP on 10/23/2019 at 7:45 AM     Attending Physician Statement  I have discussed the care of Kayla Smith and   I have examined the patient myselft independently, and taken ros and hpi , including pertinent history and exam findings,  with the author of this note . I have reviewed the key elements of all parts of the encounter with the nurse practitioner/resident.     I agree with the assessment, plan and orders as

## 2019-10-23 NOTE — PROGRESS NOTES
pain  RESPIRATORY:  Pos for shortness of breath, cough, congestion, wheezing. CARDIOVASCULAR:  negative for chest pain, palpitations. GASTROINTESTINAL:  negative for nausea, vomiting, diarrhea, constipation, change in bowel habits, abdominal pain   GENITOURINARY:  negative for difficulty of urination, burning with urination, frequency   INTEGUMENT:  negative for rash, skin lesions, easy bruising   HEMATOLOGIC/LYMPHATIC:  negative for swelling/edema   ALLERGIC/IMMUNOLOGIC:  negative for urticaria , itching  ENDOCRINE:  negative increase in drinking, increase in urination, hot or cold intolerance  MUSCULOSKELETAL:  negative joint pains, muscle aches, swelling of joints  NEUROLOGICAL:  negative for headaches, dizziness, lightheadedness, numbness, pain, tingling extremities  BEHAVIOR/PSYCH:  negative for depression, anxiety    Physical Exam:   /79   Pulse 113   Temp 99.1 °F (37.3 °C) (Oral)   Resp 18   Ht 5' 1\" (1.549 m)   Wt 206 lb 2.1 oz (93.5 kg)   SpO2 (!) 89%   BMI 38.95 kg/m²   No results for input(s): POCGLU in the last 72 hours. General Appearance:  alert, well appearing, and in no acute distress jaundice  Mental status: oriented to person, place, and time with normal affect  Head:  normocephalic, atraumatic. Eye: no icterus, redness, pupils equal and reactive, extraocular eye movements intact, conjunctiva clear  Mouth: mucous membranes moist  Neck: supple, no carotid bruits, thyroid not palpable  Lungs:   decreaseed air entry   Cardiovascular: normal rate, regular rhythm, no murmur, gallop, rub.   Abdomen: Soft, nontender, nondistended, normal bowel sounds, no hepatomegaly or splenomegaly  Neurologic: There are no new focal motor or sensory deficits, normal muscle tone and bulk, no abnormal sensation, normal speech, cranial nerves II through XII grossly intact  Skin: No gross lesions, rashes, bruising or bleeding on exposed skin area  Extremities:  peripheral pulses palpable, no pedal edema or f/u lung bx TECHNOLOGIST PROVIDED HISTORY: dyspnea, f/u lung bx Reason for Exam: f/u lung bx; dyspnea Acuity: Chronic Type of Exam: Subsequent/Follow-up FINDINGS: Minimal left apical pneumothorax. Persistent left upper lobe opacity. Small pleural effusions. Cardiomegaly. No pulmonary edema. Minimal left apical pneumothorax. Xr Chest Standard (2 Vw)    Result Date: 9/26/2019  EXAMINATION: TWO XRAY VIEWS OF THE CHEST 9/26/2019 1:52 pm COMPARISON: Chest radiograph performed 11/21/2016. HISTORY: ORDERING SYSTEM PROVIDED HISTORY: SOB TECHNOLOGIST PROVIDED HISTORY: SOB Reason for Exam: Pneumonia Acuity: Unknown Type of Exam: Unknown FINDINGS: There is chronic pulmonary change. There is no acute consolidation or effusion. Rounded opacity in the left midlung. There is no pneumothorax. The mediastinal structures are unremarkable. The upper abdomen is unremarkable. The extrathoracic soft tissues are unremarkable. Chronic pulmonary change without acute cardiopulmonary process. Rounded opacity in the left midlung and correlation with chest CT with contrast may be helpful in further characterization. Ct Chest W Contrast    Result Date: 9/27/2019  EXAMINATION: CT OF THE CHEST WITH CONTRAST 9/27/2019 11:33 am TECHNIQUE: CT of the chest was performed with the administration of intravenous contrast. Multiplanar reformatted images are provided for review. Dose modulation, iterative reconstruction, and/or weight based adjustment of the mA/kV was utilized to reduce the radiation dose to as low as reasonably achievable. COMPARISON: CT lung screening from 10/29/2018 HISTORY: ORDERING SYSTEM PROVIDED HISTORY: Pulmonary Nodule TECHNOLOGIST PROVIDED HISTORY: Reason for Exam: follow up pulmonary nodule Acuity: Unknown Type of Exam: Unknown FINDINGS: Mediastinum: Mediastinal lymphadenopathy, new from the prior CT.   Lymph node conglomerate interposed between the left common carotid and the left subclavian artery measures Otherwise unremarkable renal ultrasound. Ct Abdomen Pelvis W Iv Contrast Additional Contrast? Oral    Result Date: 9/28/2019  EXAMINATION: CT OF THE ABDOMEN AND PELVIS WITH CONTRAST 9/28/2019 11:16 am TECHNIQUE: CT of the abdomen and pelvis was performed with the administration of intravenous contrast. Multiplanar reformatted images are provided for review. Dose modulation, iterative reconstruction, and/or weight based adjustment of the mA/kV was utilized to reduce the radiation dose to as low as reasonably achievable. COMPARISON: CT chest 09/27/2019 HISTORY: ORDERING SYSTEM PROVIDED HISTORY: liver lesions, lung mass TECHNOLOGIST PROVIDED HISTORY: Reason for Exam: liver lesions, lung mass Acuity: Unknown Type of Exam: Unknown Additional signs and symptoms: PTY STATES RECENT DIAGNOSIS OF LUNG CANCER, R/O METS Relevant Medical/Surgical History: SURGERIES-IRIS AND BSO FINDINGS: Lower Chest: Bibasilar dependent atelectasis. Partially visualized enlarged subcarinal left hilar lymph nodes. Multiple mildly prominent pericardial lymph nodes. Enlarged distal paraesophageal lymph nodes. Organs: Innumerable hypodense lesions in the liver consistent with metastatic disease. 1.2 cm well-circumscribed hypodense lesion along the inferior gallbladder fossa in the right hepatic lobe which is consistent with simple cyst.  Diffusely nodular hepatic contour. Spleen, pancreas, adrenal glands and left kidney appear unremarkable. 2 non obstructing calculi lower pole right kidney both of which measure approximately 5 mm. GI/Bowel: No bowel obstruction or inflammation. Normal appendix. Moderate size hiatal hernia. Pelvis: Residual contrast in the bladder from recent contrast-enhanced chest CT examination. Hysterectomy.  Peritoneum/Retroperitoneum: Upper abdominal lymphadenopathy most prominent in the portal caval and gastrohepatic regions including areas of conglomerate bulky adenopathy measuring up to approximately 2 cm in short left upper lobe nodule. Minimal, 1 mm left apical pneumothorax has not significantly changed. Minimal, 1 mm left apical pneumothorax is not significantly changed. Xr Chest Portable    Result Date: 10/1/2019  EXAMINATION: ONE XRAY VIEW OF THE CHEST 9/30/2019 2:32 pm COMPARISON: September 30, 2019 12:35 HISTORY: ORDERING SYSTEM PROVIDED HISTORY: follow up SRIRAM lung biopsy TECHNOLOGIST PROVIDED HISTORY: follow up SRIRAM lung biopsy Reason for Exam: Post SRIRAM. Lung Bx. Acuity: Unknown Type of Exam: Unknown FINDINGS: Exam was submitted on October 1, 2019 10:20 a.m. for interpretation. No definite pneumothorax. Persistent left upper lobe opacity. Cardiomegaly. No pulmonary edema. No definite pneumothorax. Xr Chest Portable    Result Date: 10/1/2019  EXAMINATION: ONE XRAY VIEW OF THE CHEST 9/30/2019 12:56 pm COMPARISON: Earlier same day. HISTORY: ORDERING SYSTEM PROVIDED HISTORY: follow up SRIRAM lung biopsy TECHNOLOGIST PROVIDED HISTORY: follow up SRIRAM lung biopsy Reason for Exam: Post Lung biopsy Acuity: Acute Type of Exam: Initial FINDINGS: Heart size is at the upper limits of normal.  Post biopsy changes in the left suprahilar lung. No definite pneumothorax. No pleural effusion. The pulmonary vasculature is slightly prominent. Post biopsy changes in the left suprahilar lung. No definite pneumothorax. Ir Port Placement > 5 Years    Result Date: 10/10/2019  PROCEDURE: ULTRASOUND GUIDED VASCULAR ACCESS. FLUOROSCOPY GUIDED PLACEMENT OF A SUBCUTANEOUS PORT-A-CATH. 10/10/2019. HISTORY: ORDERING SYSTEM PROVIDED HISTORY: Primary malignant neoplasm of lung metastatic to other site, unspecified laterality (HCC) SEDATION: None FLUOROSCOPY DOSE AND TYPE OR TIME AND EXPOSURES: 1 minute 18 seconds; D  cGy cm2 TECHNIQUE: This procedure was performed by Dr. Timothy Zhang. Informed consent was obtained after a detailed explanation of the procedure including risks, benefits, and alternatives.   All elements of maximal sterile barrier technique were utilized. Initial ultrasound evaluation shows the right internal jugular vein to be patent and accessible. Static image was obtained for the patient's medical record. Right neck and chest were prepped and draped in standard sterile fashion. The patient was pretreated with intravenous antibiotics. 1% lidocaine and lidocaine with epinephrine used for local anesthesia. Using realtime ultrasound guidance the right internal jugular vein was accessed with a micropuncture needle and guidewire advanced centrally. Transitional dilator was placed which allowed placement of an 0.035 guidewire which had its tip directed under fluoroscopy to the inferior vena cava. Peel-away sheath was placed over the guidewire. 8 Wallisian catheter was placed over the guidewire through the peel-away sheath. The guidewire and sheath were subsequently removed. A 1 inch transverse incision was made in the right infraclavicular region and a subcutaneous pocket created using sharp and blunt dissection. The pocket is shown to be clean and dry after irrigating with antibiotic solution. The catheter was pulled from the venotomy site to the pocket through a subcutaneous tunnel. Under fluoroscopy the catheter was trimmed to the appropriate length such that the catheter tip is in the proximal right atrium. The catheter is connected to the port and the system is shown to be patent without evidence of leak. The port was flushed with heparin lock solution. Port was placed into the pocket and anchored with 2-0 Vicryl suture. The incision was closed with 2-0 Vicryl suture for the deep tissues and 4-0 Vicryl suture in a running subcuticular fashion for the superficial tissues. Dermaflex was applied. There are no immediate complications. The patient left the department in stable condition. FINDINGS: Fluoroscopic image demonstrates the tip of the port at the right atrium.  [employer] titanium power injectable port with MD Cirilo   1 g at 10/21/19 1619    traMADol (ULTRAM) tablet 50 mg  50 mg Oral Q4H PRN Turner Kerr MD   50 mg at 10/21/19 2349    nicotine (NICODERM CQ) 21 MG/24HR 1 patch  1 patch Transdermal Daily Turner Kerr MD   1 patch at 10/23/19 1206    piperacillin-tazobactam (ZOSYN) 3.375 g in dextrose 5 % 50 mL IVPB extended infusion (mini-bag)  3.375 g Intravenous Q8H Daren Redding MD   Stopped at 10/23/19 0505    ipratropium-albuterol (DUONEB) nebulizer solution 1 ampule  1 ampule Inhalation Q4H WA Turner Kerr MD   1 ampule at 10/23/19 1901 DAVID Keenan, [address] [facility]         10/23/2019, 1:08 PM    Attestation and add on       I have discussed the care of Varinder López , including pertinent history and exam findings,    10/23/19   with the resident. I have seen and examined the patient and the key elements of all parts of the encounter have been performed by me . I agree with the assessment, plan and orders as documented by the resident. Principal Problem:    Hepatic cirrhosis ([address]  Active Problems:    Pneumonia    Elevated LFTs  Resolved Problems:    * No resolved hospital problems. *        ---       Medications: Allergies:     Allergies   Allergen Reactions    Vesicare [Solifenacin] Nausea And Vomiting       Current Meds:   Scheduled Meds:    metoprolol  2.5 mg Intravenous Q6H    vancomycin  1,500 mg Intravenous Q24H    famotidine  20 mg Oral Daily    methylPREDNISolone  30 mg Intravenous Q8H    vancomycin (VANCOCIN) intermittent dosing (placeholder)   Other RX Placeholder    busPIRone  10 mg Oral TID    QUEtiapine  100 mg Oral Nightly    simvastatin  40 mg Oral Nightly    sodium chloride  1 g Oral TID WC    nicotine  1 patch Transdermal Daily    piperacillin-tazobactam (ZOSYN) 3.375 g in dextrose 5% IVPB extended infusion (mini-bag)  3.375 g Intravenous Q8H    ipratropium-albuterol  1 ampule Inhalation Q4H WA     Continuous Infusions:    sodium chloride 100 mL/hr at 10/22/19 2108     PRN Meds: morphine **OR** morphine, promethazine (PHENERGAN) in sodium chloride 0.9% IVPB, ondansetron, perflutren lipid microspheres, traMADol      ---- ;     [facility] Lencho Wilson [address] [place] [address], [address] [place] [address].    Phone [phone]   Fax: [phone]  Answering Service: [phone]

## 2019-10-23 NOTE — PROGRESS NOTES
History and Physical Service  [facility] Internal Medicine                Date:   10/23/2019  Patient name:  Kaleigh Mccollum  MRN:   [id number]  Account:  [de-identified]  YOB: 1955  PCP:    Nakul Garcia MD  Code Status:    DNR-CC    Chief Complaint:     jaundice    History Obtained From:     patient    Subjective:     Currently NPO. MRCP showing diffuse hepatic metastasis. CBD 4.3 mm. Common pancreatic duct 2 mm. Plan ERCP today to stent if possible. Received a call from Dr. Doni Esteban  Attempt was made to do ERCP  However while doing the procedure it was noted that patient's stomach was full and  The procedure was aborted  Patient is having some respiratory distress  Patient is DNR CC  Has advanced malignancy    Patient being discharged with hospice  Past Medical History:     Past Medical History:   Diagnosis Date    Atypical depressive disorder     Cancer ([address]     Lung Cancer mets to bone    Hypotension, unspecified     Mitral valve disorders(424.0)     Obstructive chronic bronchitis with exacerbation ([address]     Other and unspecified hyperlipidemia     Other malaise and fatigue     Temporomandibular joint disorders, unspecified     Unspecified breast disorder     Unspecified vitamin D deficiency         Past Surgical History:     Past Surgical History:   Procedure Laterality Date    ERCP N/A 10/22/2019    ERCP ENDOSCOPIC RETROGRADE CHOLANGIOPANCREATOGRAPHY performed by Gordy De Luna MD at [facility], [address] [place] [address]        Medications Prior to Admission:     Prior to Admission medications    Medication Sig Start Date End Date Taking? Authorizing Provider   traMADol (ULTRAM) 50 MG tablet Take 1 tablet by mouth every 4 hours as needed for Pain for up to 3 days. Intended supply: 3 days.  Take lowest dose possible to manage pain 10/23/19 10/26/19 Yes Kayla Gordon MD   ipratropium-albuterol (DUONEB) 0.5-2.5 (3) MG/3ML SOLN changes, runny nose, throat pain  RESPIRATORY:  Pos for shortness of breath, cough, congestion, wheezing. CARDIOVASCULAR:  negative for chest pain, palpitations. GASTROINTESTINAL:  negative for nausea, vomiting, diarrhea, constipation, change in bowel habits, abdominal pain   GENITOURINARY:  negative for difficulty of urination, burning with urination, frequency   INTEGUMENT:  negative for rash, skin lesions, easy bruising   HEMATOLOGIC/LYMPHATIC:  negative for swelling/edema   ALLERGIC/IMMUNOLOGIC:  negative for urticaria , itching  ENDOCRINE:  negative increase in drinking, increase in urination, hot or cold intolerance  MUSCULOSKELETAL:  negative joint pains, muscle aches, swelling of joints  NEUROLOGICAL:  negative for headaches, dizziness, lightheadedness, numbness, pain, tingling extremities  BEHAVIOR/PSYCH:  negative for depression, anxiety    Physical Exam:   /64   Pulse 111   Temp 97.9 °F (36.6 °C) (Oral)   Resp 15   Ht 5' 1\" (1.549 m)   Wt 206 lb 2.1 oz (93.5 kg)   SpO2 90%   BMI 38.95 kg/m²   No results for input(s): POCGLU in the last 72 hours. General Appearance:  alert, well appearing, and in no acute distress jaundice  Mental status: oriented to person, place, and time with normal affect  Head:  normocephalic, atraumatic. Eye: no icterus, redness, pupils equal and reactive, extraocular eye movements intact, conjunctiva clear  Mouth: mucous membranes moist  Neck: supple, no carotid bruits, thyroid not palpable  Lungs:   decreaseed air entry   Cardiovascular: normal rate, regular rhythm, no murmur, gallop, rub.   Abdomen: Soft, nontender, nondistended, normal bowel sounds, no hepatomegaly or splenomegaly  Neurologic: There are no new focal motor or sensory deficits, normal muscle tone and bulk, no abnormal sensation, normal speech, cranial nerves II through XII grossly intact  Skin: No gross lesions, rashes, bruising or bleeding on exposed skin area  Extremities:  peripheral pulses subclavian artery measures 3.8 x 2.1 cm. Conglomerate prevascular lymphadenopathy increased from prior exam.  A rounded centrally necrotic lymph node (series 2, image 33) measures 3.2 x 2.2 cm. Subcarinal lymphadenopathy measuring 3.6 x 2.7 cm (series 2, image 51). Left hilar node (series 2, image 41) measures 2.4 x 1.7 cm. Cardiomegaly. Coronary artery disease. Lungs/pleura: Left upper lobe nodule measures 3.0 x 2.3 cm (series 4, image 34). Dependent changes. Atelectasis in the left lower lobe, seemingly related to hilar lymphadenopathy but an endobronchial lesion is not entirely excluded. Upper Abdomen: Heterogeneous liver compatible with diffuse hepatic metastatic disease. Sliding-type hiatal hernia. Lymph node adjacent to the sliding-type hiatal hernia (series 2, image 85) measures 2.4 x 1.4 cm. Soft Tissues/Bones: Age-compatible degenerative change. 3.0 x 2.3 cm left upper lobe pulmonary nodule suspicious for neoplasm. Tissue sampling advised. Extensive mediastinal lymphadenopathy concerning for mediastinal metastatic disease. Diffuse replacement of hepatic parenchyma with round low-density lesions suspicious for hepatic metastatic disease. The findings were sent to the Radiology Results [address] at 11:59 am on 9/27/2019to be communicated to a licensed caregiver. Us Renal Complete    Result Date: 9/26/2019  EXAMINATION: RETROPERITONEAL ULTRASOUND OF THE KIDNEYS 9/26/2019 COMPARISON: 06/30/2006, CT 07/21/2013 HISTORY: ORDERING SYSTEM PROVIDED HISTORY: RENAL FAILURE, ACUTE (KIDNEY INJURY) FINDINGS: Right kidney measures 12.3 x 4.1 x 4.9 cm without hydronephrosis. Cortical thickness measures 14.2 mm. Left kidney measures 11.9 x 6.3 x 3.7 cm without hydronephrosis. Cortical thickness measures 13.7 mm. Echogenicity appears grossly intact bilaterally. 8-9 mm echogenic focus in the right kidney suspicious for nonobstructing calculus. No perirenal fluid collections.      Nonobstructing right renal calculus. Otherwise unremarkable renal ultrasound. Ct Abdomen Pelvis W Iv Contrast Additional Contrast? Oral    Result Date: 9/28/2019  EXAMINATION: CT OF THE ABDOMEN AND PELVIS WITH CONTRAST 9/28/2019 11:16 am TECHNIQUE: CT of the abdomen and pelvis was performed with the administration of intravenous contrast. Multiplanar reformatted images are provided for review. Dose modulation, iterative reconstruction, and/or weight based adjustment of the mA/kV was utilized to reduce the radiation dose to as low as reasonably achievable. COMPARISON: CT chest 09/27/2019 HISTORY: ORDERING SYSTEM PROVIDED HISTORY: liver lesions, lung mass TECHNOLOGIST PROVIDED HISTORY: Reason for Exam: liver lesions, lung mass Acuity: Unknown Type of Exam: Unknown Additional signs and symptoms: PTY STATES RECENT DIAGNOSIS OF LUNG CANCER, R/O METS Relevant Medical/Surgical History: SURGERIES-IRIS AND BSO FINDINGS: Lower Chest: Bibasilar dependent atelectasis. Partially visualized enlarged subcarinal left hilar lymph nodes. Multiple mildly prominent pericardial lymph nodes. Enlarged distal paraesophageal lymph nodes. Organs: Innumerable hypodense lesions in the liver consistent with metastatic disease. 1.2 cm well-circumscribed hypodense lesion along the inferior gallbladder fossa in the right hepatic lobe which is consistent with simple cyst.  Diffusely nodular hepatic contour. Spleen, pancreas, adrenal glands and left kidney appear unremarkable. 2 non obstructing calculi lower pole right kidney both of which measure approximately 5 mm. GI/Bowel: No bowel obstruction or inflammation. Normal appendix. Moderate size hiatal hernia. Pelvis: Residual contrast in the bladder from recent contrast-enhanced chest CT examination. Hysterectomy.  Peritoneum/Retroperitoneum: Upper abdominal lymphadenopathy most prominent in the portal caval and gastrohepatic regions including areas of conglomerate bulky adenopathy measuring up to opacity is consistent with known left upper lobe nodule. Minimal, 1 mm left apical pneumothorax has not significantly changed. Minimal, 1 mm left apical pneumothorax is not significantly changed. Xr Chest Portable    Result Date: 10/1/2019  EXAMINATION: ONE XRAY VIEW OF THE CHEST 9/30/2019 2:32 pm COMPARISON: September 30, 2019 12:35 HISTORY: ORDERING SYSTEM PROVIDED HISTORY: follow up SRIRAM lung biopsy TECHNOLOGIST PROVIDED HISTORY: follow up SRIRAM lung biopsy Reason for Exam: Post SRIRAM. Lung Bx. Acuity: Unknown Type of Exam: Unknown FINDINGS: Exam was submitted on October 1, 2019 10:20 a.m. for interpretation. No definite pneumothorax. Persistent left upper lobe opacity. Cardiomegaly. No pulmonary edema. No definite pneumothorax. Xr Chest Portable    Result Date: 10/1/2019  EXAMINATION: ONE XRAY VIEW OF THE CHEST 9/30/2019 12:56 pm COMPARISON: Earlier same day. HISTORY: ORDERING SYSTEM PROVIDED HISTORY: follow up SRIRAM lung biopsy TECHNOLOGIST PROVIDED HISTORY: follow up SRIRAM lung biopsy Reason for Exam: Post Lung biopsy Acuity: Acute Type of Exam: Initial FINDINGS: Heart size is at the upper limits of normal.  Post biopsy changes in the left suprahilar lung. No definite pneumothorax. No pleural effusion. The pulmonary vasculature is slightly prominent. Post biopsy changes in the left suprahilar lung. No definite pneumothorax. Ir Port Placement > 5 Years    Result Date: 10/10/2019  PROCEDURE: ULTRASOUND GUIDED VASCULAR ACCESS. FLUOROSCOPY GUIDED PLACEMENT OF A SUBCUTANEOUS PORT-A-CATH. 10/10/2019. HISTORY: ORDERING SYSTEM PROVIDED HISTORY: Primary malignant neoplasm of lung metastatic to other site, unspecified laterality (HCC) SEDATION: None FLUOROSCOPY DOSE AND TYPE OR TIME AND EXPOSURES: 1 minute 18 seconds; D  cGy cm2 TECHNIQUE: This procedure was performed by Dr. Madeline Witt.   Informed consent was obtained after a detailed explanation of the procedure including risks, benefits, and titanium power injectable port with 8 Khmer catheter was placed. Successful ultrasound and fluoroscopy guided Port-A-Cath placement     Ct Needle Biopsy Lung Percutaneous    Result Date: 9/30/2019  PROCEDURE: CT NEEDLE BIOPSY LUNG PERCUTANEOUS 9/30/2019 HISTORY: ORDERING SYSTEM PROVIDED HISTORY: left upper lobe lung mass; discussed with Dr. Fede Acosta TECHNOLOGIST PROVIDED HISTORY: left upper lobe lung mass; discussed with Dr. Yao Arredondo: Dose modulation, iterative reconstruction, and/or weight based adjustment of the mA/kV was utilized to reduce the radiation dose to as low as reasonably achievable. CONTRAST: None SEDATION: None DESCRIPTION OF PROCEDURE: Informed consent was obtained after a detailed explanation of the procedure including risks, benefits, and alternatives. Universal protocol was observed. Patient was placed supine on the CT table. The left upper hemithorax was cleaned, prepped and draped in the usual sterile fashion. Local anesthetic was administered after CT localization of the area of interest. An 18 gauge core biopsy needle was advanced to the lesion located in the left upper lung under CT guidance. 3 core biopsy specimens were obtained and sent to pathology for further evaluation. The core biopsy needle was then removed and sterile dressing was applied. A very tiny pneumothorax was noted at the termination of the procedure. Patient tolerated procedure well. FINDINGS: Successful CT-guided core biopsy of lesion located in the upper lobe of the left lung. Tiny postprocedure pneumothorax. Successful CT-guided core biopsy of lesion in upper lobe of left lung with a tiny postprocedural pneumothorax. Follow-up with x-rays chest at 1 hour and 2 hours after termination of procedure.      Mri Brain W Wo Contrast    Result Date: 10/1/2019  EXAMINATION: MRI OF THE BRAIN WITHOUT AND WITH CONTRAST  10/1/2019 8:17 pm TECHNIQUE: Multiplanar multisequence MRI of the head/brain was performed without Follow CXR and Cultures for Possible pneumonia. Plans:       1 No signs and symptoms of cholangitis or pneumonia. Looks like diffuse metastatic disease. Will stop Vanc and zosyn. ERCP today. MRCP showed diffuse metastatic disease with normal CBD.      Received a call from Dr. Yessy Aggarwal  Attempt was made to do ERCP  However while doing the procedure it was noted that patient's stomach was full and  The procedure was aborted  Patient is having some respiratory distress  Patient is DNR CC  Has advanced malignancy    Discharge with hospice  Current Facility-Administered Medications   Medication Dose Route Frequency Provider Last Rate Last Dose    morphine (PF) injection 2 mg  2 mg Intravenous Q2H PRN Dimitri Kahnh, APRN - CNP        Or    morphine sulfate (PF) injection 4 mg  4 mg Intravenous Q2H PRN Dimitri Khanh, APRN - CNP   4 mg at 10/23/19 1423    metoprolol (LOPRESSOR) injection 2.5 mg  2.5 mg Intravenous Q6H Dimitri Khanh, APRN - CNP   2.5 mg at 10/23/19 1135    vancomycin (VANCOCIN) 1500 mg in dextrose 5 % 250 mL IVPB  1,500 mg Intravenous Q24H Turner Kerr .7 mL/hr at 10/23/19 1134 1,500 mg at 10/23/19 1134    famotidine (PEPCID) tablet 20 mg  20 mg Oral Daily Turner Kerr MD        promethazine (PHENERGAN) 12.5 mg in sodium chloride 0.9 % 50 mL IVPB  12.5 mg Intravenous Q6H PRN Gianni Cole MD   Stopped at 10/21/19 2130    0.9 % sodium chloride infusion   Intravenous Continuous Turner Kerr  mL/hr at 10/22/19 2108      methylPREDNISolone sodium (SOLU-MEDROL) injection 30 mg  30 mg Intravenous Q8H Turner Kerr MD   30 mg at 10/23/19 1135    ondansetron (ZOFRAN-ODT) disintegrating tablet 4 mg  4 mg Oral Q6H PRN Turner Kerr MD   4 mg at 10/21/19 1509    vancomycin (VANCOCIN) intermittent dosing (placeholder)   Other RX Placeholder Turner Kerr MD        perflutren lipid microspheres (DEFINITY) injection 2.2 mg  2 mL Intravenous ONCE PRN Turner Kerr MD        busPIRone (BUSPAR) tablet 10 mg  10 mg Oral TID Turner Kerr MD   10 mg at 10/23/19 1130    QUEtiapine (SEROQUEL) tablet 100 mg  100 mg Oral Nightly Turner Kerr MD   100 mg at 10/22/19 1950    simvastatin (ZOCOR) tablet 40 mg  40 mg Oral Nightly Turner Kerr MD   40 mg at 10/22/19 1950    sodium chloride tablet 1 g  1 g Oral TID WC Turner Kerr MD   1 g at 10/21/19 1619    traMADol (ULTRAM) tablet 50 mg  50 mg Oral Q4H PRN Turner Kerr MD   50 mg at 10/21/19 2349    nicotine (NICODERM CQ) 21 MG/24HR 1 patch  1 patch Transdermal Daily Turner Kerr MD   1 patch at 10/23/19 1206    piperacillin-tazobactam (ZOSYN) 3.375 g in dextrose 5 % 50 mL IVPB extended infusion (mini-bag)  3.375 g Intravenous Mira Mcneil MD   Stopped at 10/23/19 0505    ipratropium-albuterol (DUONEB) nebulizer solution 1 ampule  1 ampule Inhalation Q4H WA Turner Kerr MD   1 ampule at 10/23/19 [facility] Dolores Mejía MD  10/23/2019  2:29 PM  .[email or web address]

## 2019-10-23 NOTE — DISCHARGE SUMMARY
[facility]    Discharge Summary     Patient ID: Stalin Posey  :  1955   MRN: [id number]     ACCOUNT:  [de-identified]   Patient's PCP: Huyen Ríos MD  Admit Date: 10/17/2019   Discharge Date:  10/23/19    Length of Stay: 5  Code Status:  DNR-CC  Admitting Physician: Huyen Ríos MD  Discharge Physician: Ash Liz MD     Active Discharge Diagnoses:     Primary Problem  Hepatic cirrhosis [facility])      [facility] Problems    Diagnosis Date Noted    Elevated LFTs [R94.5]     Hepatic cirrhosis ([facility] [address] [K74.60]     Pneumonia [J18.9] 10/18/2019       Admission Condition:  poor     Discharged Condition: poor    Hospital Stay:     Hospital Course:  Stalin Posey is a 59 y.o. female who was admitted for the management of   .     , presented with No chief complaint on file. ,                         Hepatic cirrhosis ([facility] [address] Principal Problem:    Hepatic cirrhosis ([facility] [address]  Active Problems:    Pneumonia    Elevated LFTs  Resolved Problems:    * No resolved hospital problems. *       Significant therapeutic interventions: The patient is a 59 y.o. Non-/non  female who presents  Admitted from office   jaundice      sob baseline   no fever  ERCP attempted overnight however patient was suffering from acute shortness of breath which it was aborted. Patient was transferred to ICU. Hematology oncology had a long discussion with the patient overnight and decided to change the CODE STATUS of DNR CC. Patient be transferred to [place] [id number]. Significant Diagnostic Studies:   Labs / Micro:       Results for orders placed or performed during the hospital encounter of 10/17/19   Cult,Blood   Result Value Ref Range    Specimen Description . BLOOD     Special Requests  left cephalic 4ml purple 2ml red     Culture NO GROWTH 2 DAYS    CULTURE BLOOD #2   Result Value Ref Range    Specimen Description 59 (L) >60 mL/min    GFR African American >60 >60 mL/min    GFR Comment          GFR Staging NOT REPORTED    CBC   Result Value Ref Range    WBC 13.9 (H) 3.5 - 11.0 k/uL    RBC 3.70 (L) 4.0 - 5.2 m/uL    Hemoglobin 10.2 (L) 12.0 - 16.0 g/dL    Hematocrit 31.5 (L) 36 - 46 %    MCV 85.0 80 - 100 fL    MCH 27.6 26 - 34 pg    MCHC 32.5 31 - 37 g/dL    RDW 17.6 (H) 11.5 - 14.9 %    Platelets 742 280 - 490 k/uL    MPV 8.5 6.0 - 12.0 fL    NRBC Automated NOT REPORTED per 100 WBC   VANCOMYCIN, TROUGH   Result Value Ref Range    Vancomycin Tr 13.7 10.0 - 20.0 ug/mL    Vancomycin Trough Dose amount 1500 MG     Vancomycin Trough Date last dose 10,192,019     Vancomycin Trough Time last dose 208    Comprehensive Metabolic Panel w/ Reflex to MG   Result Value Ref Range    Glucose 164 (H) 70 - 99 mg/dL    BUN 36 (H) 8 - 23 mg/dL    CREATININE 1.40 (H) 0.50 - 0.90 mg/dL    Bun/Cre Ratio NOT REPORTED 9 - 20    Calcium 8.6 8.6 - 10.4 mg/dL    Sodium 134 (L) 135 - 144 mmol/L    Potassium 4.1 3.7 - 5.3 mmol/L    Chloride 99 98 - 107 mmol/L    CO2 19 (L) 20 - 31 mmol/L    Anion Gap 16 9 - 17 mmol/L    Alkaline Phosphatase 1,406 (H) 35 - 104 U/L     (H) 5 - 33 U/L     (H) <32 U/L    Total Bilirubin 9.81 (H) 0.3 - 1.2 mg/dL    Total Protein 6.3 (L) 6.4 - 8.3 g/dL    Alb 3.2 (L) 3.5 - 5.2 g/dL    Albumin/Globulin Ratio NOT REPORTED 1.0 - 2.5    GFR Non- 38 (L) >60 mL/min    GFR  46 (L) >60 mL/min    GFR Comment          GFR Staging NOT REPORTED    CBC   Result Value Ref Range    WBC 18.5 (H) 3.5 - 11.0 k/uL    RBC 3.86 (L) 4.0 - 5.2 m/uL    Hemoglobin 11.0 (L) 12.0 - 16.0 g/dL    Hematocrit 33.3 (L) 36 - 46 %    MCV 86.2 80 - 100 fL    MCH 28.5 26 - 34 pg    MCHC 33.1 31 - 37 g/dL    RDW 17.7 (H) 11.5 - 14.9 %    Platelets 483 780 - 779 k/uL    MPV 8.6 6.0 - 12.0 fL    NRBC Automated NOT REPORTED per 100 WBC   Iron and TIBC   Result Value Ref Range    Iron 96 37 - 145 ug/dL    TIBC 279 250 - 450 ug/dL    Iron Saturation 34 20 - 55 %    UIBC 183 112 - 347 ug/dL   Ferritin   Result Value Ref Range    Ferritin 474 (H) 13 - 150 ug/L   Folate   Result Value Ref Range    Folate 7.5 >4.8 ng/mL   Vitamin B12   Result Value Ref Range    Vitamin B-12 1262 (H) 232 - 1245 pg/mL   Alpha-1-Antitrypsin   Result Value Ref Range    A-1 Antitrypsin 228 (H) 90 - 200 mg/dL   PREM   Result Value Ref Range    PREM NEGATIVE NEGATIVE   AFP Tumor Marker   Result Value Ref Range    AFP (Alpha Fetoprotein) 3.9 <8.4 ug/L   IgA   Result Value Ref Range    IgA 312 70 - 400 mg/dL   Ceruloplasmin   Result Value Ref Range    Ceruloplasmin 37 16 - 45 mg/dL   MITOCHONDRIAL ANTIBODIES, M2, IGG   Result Value Ref Range    Mitochondrial Ab 2.4 0.0 - 20.0 Units   Janet-Barr virus VCA antibody panel   Result Value Ref Range    EBV VCA IgG 631 (H) <100 U/mL    EBV Nuclear Ag Ab 755 (H) <100 U/mL    EBV EA IgG 31 <100 U/mL    EBV VCA IgM 17 <100 U/mL    EBV Interp. (NOTE)    Cytomegalovirus Antibody, IgM   Result Value Ref Range    CMV IgM 0.3 <0.9   Cytomegalovirus Antibody, IgG   Result Value Ref Range    CMV IgG 0.1 <0.9   GAMMA GT   Result Value Ref Range    GGT 2,344 (H) 5 - 36 U/L   Smooth Muscle Antibody Quant   Result Value Ref Range    Smooth Muscle Ab 5 0 - 19 Units   CBC   Result Value Ref Range    WBC 18.2 (H) 3.5 - 11.0 k/uL    RBC 3.69 (L) 4.0 - 5.2 m/uL    Hemoglobin 10.4 (L) 12.0 - 16.0 g/dL    Hematocrit 32.0 (L) 36 - 46 %    MCV 86.6 80 - 100 fL    MCH 28.2 26 - 34 pg    MCHC 32.5 31 - 37 g/dL    RDW 18.1 (H) 11.5 - 14.9 %    Platelets 467 960 - 885 k/uL    MPV 9.0 6.0 - 12.0 fL    NRBC Automated NOT REPORTED per 100 WBC   SPECIMEN REJECTION   Result Value Ref Range    Specimen Source . BLOOD     Ordered Test CMPX     Reason for Rejection Unable to perform testing: Specimen hemolyzed.      - NOT REPORTED    Comprehensive Metabolic Panel w/ Reflex to MG   Result Value Ref Range    Glucose 219 (H) 70 - 99 mg/dL    BUN 46 (H) 8 - 23 mg/dL    CREATININE 1.29 (H) 0.50 - 0.90 mg/dL    Bun/Cre Ratio NOT REPORTED 9 - 20    Calcium 8.0 (L) 8.6 - 10.4 mg/dL    Sodium 129 (L) 135 - 144 mmol/L    Potassium 4.4 3.7 - 5.3 mmol/L    Chloride 96 (L) 98 - 107 mmol/L    CO2 18 (L) 20 - 31 mmol/L    Anion Gap 15 9 - 17 mmol/L    Alkaline Phosphatase 1,381 (H) 35 - 104 U/L     (H) 5 - 33 U/L     (H) <32 U/L    Total Bilirubin 10.13 (H) 0.3 - 1.2 mg/dL    Total Protein 5.8 (L) 6.4 - 8.3 g/dL    Alb 2.9 (L) 3.5 - 5.2 g/dL    Albumin/Globulin Ratio NOT REPORTED 1.0 - 2.5    GFR Non-African American 42 (L) >60 mL/min    GFR African American 50 (L) >60 mL/min    GFR Comment          GFR Staging NOT REPORTED    Protime-INR   Result Value Ref Range    Protime 21.9 (H) 11.8 - 14.6 sec    INR 1.9    VANCOMYCIN, TROUGH   Result Value Ref Range    Vancomycin Tr 22.3 (HH) 10.0 - 20.0 ug/mL    Vancomycin Trough Dose amount 1750MG     Vancomycin Trough Date last dose 10,212,019     Vancomycin Trough Time last dose 537    CBC   Result Value Ref Range    WBC 18.6 (H) 3.5 - 11.0 k/uL    RBC 3.65 (L) 4.0 - 5.2 m/uL    Hemoglobin 10.3 (L) 12.0 - 16.0 g/dL    Hematocrit 31.6 (L) 36 - 46 %    MCV 86.6 80 - 100 fL    MCH 28.1 26 - 34 pg    MCHC 32.5 31 - 37 g/dL    RDW 18.6 (H) 11.5 - 14.9 %    Platelets 997 148 - 698 k/uL    MPV 8.4 6.0 - 12.0 fL    NRBC Automated NOT REPORTED per 100 WBC   Comprehensive Metabolic Panel w/ Reflex to MG   Result Value Ref Range    Glucose 167 (H) 70 - 99 mg/dL    BUN 56 (H) 8 - 23 mg/dL    CREATININE 1.40 (H) 0.50 - 0.90 mg/dL    Bun/Cre Ratio NOT REPORTED 9 - 20    Calcium 7.7 (L) 8.6 - 10.4 mg/dL    Sodium 132 (L) 135 - 144 mmol/L    Potassium 4.6 3.7 - 5.3 mmol/L    Chloride 98 98 - 107 mmol/L    CO2 16 (L) 20 - 31 mmol/L    Anion Gap 18 (H) 9 - 17 mmol/L    Alkaline Phosphatase 1,343 (H) 35 - 104 U/L     (H) 5 - 33 U/L     (H) <32 U/L    Total Bilirubin 11.49 (H) 0.3 - 1.2 mg/dL    Total Protein 5.7 (L) 6.4 - 8.3 in the right mid lung and right lung base. No pleural effusion or pneumothorax. Heart size is enlarged, stable. 1. New patchy right mid lung and right basilar opacity may represent developing multifocal pneumonia or pulmonary edema. 2. Right IJ port tip in the right atrium. Xr Chest Standard (2 Vw)    Result Date: 10/1/2019  EXAMINATION: TWO XRAY VIEWS OF THE CHEST 10/1/2019 10:11 am COMPARISON: September 30, 2019 HISTORY: ORDERING SYSTEM PROVIDED HISTORY: dyspnea, f/u lung bx TECHNOLOGIST PROVIDED HISTORY: dyspnea, f/u lung bx Reason for Exam: f/u lung bx; dyspnea Acuity: Chronic Type of Exam: Subsequent/Follow-up FINDINGS: Minimal left apical pneumothorax. Persistent left upper lobe opacity. Small pleural effusions. Cardiomegaly. No pulmonary edema. Minimal left apical pneumothorax. Xr Chest Standard (2 Vw)    Result Date: 9/26/2019  EXAMINATION: TWO XRAY VIEWS OF THE CHEST 9/26/2019 1:52 pm COMPARISON: Chest radiograph performed 11/21/2016. HISTORY: ORDERING SYSTEM PROVIDED HISTORY: SOB TECHNOLOGIST PROVIDED HISTORY: SOB Reason for Exam: Pneumonia Acuity: Unknown Type of Exam: Unknown FINDINGS: There is chronic pulmonary change. There is no acute consolidation or effusion. Rounded opacity in the left midlung. There is no pneumothorax. The mediastinal structures are unremarkable. The upper abdomen is unremarkable. The extrathoracic soft tissues are unremarkable. Chronic pulmonary change without acute cardiopulmonary process. Rounded opacity in the left midlung and correlation with chest CT with contrast may be helpful in further characterization. Ct Chest Wo Contrast    Result Date: 10/20/2019  EXAMINATION: CT OF THE CHEST WITHOUT CONTRAST 10/18/2019 4:14 pm TECHNIQUE: CT of the chest was performed without the administration of intravenous contrast. Multiplanar reformatted images are provided for review.  Dose modulation, iterative reconstruction, and/or weight based adjustment of the mA/kV was utilized to reduce the radiation dose to as low as reasonably achievable. COMPARISON: CT chest 09/27/2019 HISTORY: ORDERING SYSTEM PROVIDED HISTORY: severe dyspnea  new donovan of small cell eval for pneumonia TECHNOLOGIST PROVIDED HISTORY: No contrast for elevated creatinine Reason for Exam: severe dyspnea; new donovan of small cell eval for pneumonia Acuity: Unknown Type of Exam: Unknown Relevant Medical/Surgical History: Hx of COPD FINDINGS: Mediastinum: Normal heart size. No pericardial effusion. Right-sided medical port catheter tip terminates in the right atrium. Atherosclerotic calcification of the thoracic aorta and coronary arteries. Thoracic aorta is normal in caliber. Moderate hiatal hernia. Upper paratracheal lymph node measures 4.0 x 2.0 cm (series 2, image 25, previously 3.8 x 2.1 cm. Subaortic lymph node measures 3.1 x 2.4 cm (series 2, image 38), previously 3.2 x 2.2 cm. Subcarinal lymph node measures 3.7 x 2.5 cm (series 2, image 51), unchanged. Left hilar lymph node measures 2.3 x 1.6 cm (series 2, image 43), previously 2.4 x 1.4 cm. Non indexed mediastinal lymph nodes are similarly stable in size. Lungs/pleura: Central airways are clear. Moderate centrilobular emphysematous changes, predominantly involving the upper lobes. New consolidation and ground-glass parenchymal opacities in the right upper lobe. Left upper lobe nodule measures 3.2 x 1.5 cm (series 4, image 37). No pleural effusion or pneumothorax. Upper Abdomen: Limited images of the upper abdomen are unremarkable. Previously noted hepatic lesions are not well visualized on the basis of this noncontrast examination. Soft Tissues/Bones: No acute osseous abnormality. New consolidation and ground-glass parenchymal opacities in the right upper lobe are consistent with pneumonia. Left upper lobe pulmonary nodule is not significantly changed. Mediastinal and left hilar adenopathy is not significantly changed.      Ct Chest W Contrast    Result Date: 9/27/2019  EXAMINATION: CT OF THE CHEST WITH CONTRAST 9/27/2019 11:33 am TECHNIQUE: CT of the chest was performed with the administration of intravenous contrast. Multiplanar reformatted images are provided for review. Dose modulation, iterative reconstruction, and/or weight based adjustment of the mA/kV was utilized to reduce the radiation dose to as low as reasonably achievable. COMPARISON: CT lung screening from 10/29/2018 HISTORY: ORDERING SYSTEM PROVIDED HISTORY: Pulmonary Nodule TECHNOLOGIST PROVIDED HISTORY: Reason for Exam: follow up pulmonary nodule Acuity: Unknown Type of Exam: Unknown FINDINGS: Mediastinum: Mediastinal lymphadenopathy, new from the prior CT. Lymph node conglomerate interposed between the left common carotid and the left subclavian artery measures 3.8 x 2.1 cm. Conglomerate prevascular lymphadenopathy increased from prior exam.  A rounded centrally necrotic lymph node (series 2, image 33) measures 3.2 x 2.2 cm. Subcarinal lymphadenopathy measuring 3.6 x 2.7 cm (series 2, image 51). Left hilar node (series 2, image 41) measures 2.4 x 1.7 cm. Cardiomegaly. Coronary artery disease. Lungs/pleura: Left upper lobe nodule measures 3.0 x 2.3 cm (series 4, image 34). Dependent changes. Atelectasis in the left lower lobe, seemingly related to hilar lymphadenopathy but an endobronchial lesion is not entirely excluded. Upper Abdomen: Heterogeneous liver compatible with diffuse hepatic metastatic disease. Sliding-type hiatal hernia. Lymph node adjacent to the sliding-type hiatal hernia (series 2, image 85) measures 2.4 x 1.4 cm. Soft Tissues/Bones: Age-compatible degenerative change. 3.0 x 2.3 cm left upper lobe pulmonary nodule suspicious for neoplasm. Tissue sampling advised. Extensive mediastinal lymphadenopathy concerning for mediastinal metastatic disease.  Diffuse replacement of hepatic parenchyma with round low-density lesions suspicious for hepatic metastatic disease. The findings were sent to the Radiology Results [address] at 11:59 am on 9/27/2019to be communicated to a licensed caregiver. Us Renal Complete    Result Date: 9/26/2019  EXAMINATION: RETROPERITONEAL ULTRASOUND OF THE KIDNEYS 9/26/2019 COMPARISON: 06/30/2006, CT 07/21/2013 HISTORY: ORDERING SYSTEM PROVIDED HISTORY: RENAL FAILURE, ACUTE (KIDNEY INJURY) FINDINGS: Right kidney measures 12.3 x 4.1 x 4.9 cm without hydronephrosis. Cortical thickness measures 14.2 mm. Left kidney measures 11.9 x 6.3 x 3.7 cm without hydronephrosis. Cortical thickness measures 13.7 mm. Echogenicity appears grossly intact bilaterally. 8-9 mm echogenic focus in the right kidney suspicious for nonobstructing calculus. No perirenal fluid collections. Nonobstructing right renal calculus. Otherwise unremarkable renal ultrasound. Ct Abdomen Pelvis W Iv Contrast Additional Contrast? Oral    Result Date: 9/28/2019  EXAMINATION: CT OF THE ABDOMEN AND PELVIS WITH CONTRAST 9/28/2019 11:16 am TECHNIQUE: CT of the abdomen and pelvis was performed with the administration of intravenous contrast. Multiplanar reformatted images are provided for review. Dose modulation, iterative reconstruction, and/or weight based adjustment of the mA/kV was utilized to reduce the radiation dose to as low as reasonably achievable. COMPARISON: CT chest 09/27/2019 HISTORY: ORDERING SYSTEM PROVIDED HISTORY: liver lesions, lung mass TECHNOLOGIST PROVIDED HISTORY: Reason for Exam: liver lesions, lung mass Acuity: Unknown Type of Exam: Unknown Additional signs and symptoms: PTY STATES RECENT DIAGNOSIS OF LUNG CANCER, R/O METS Relevant Medical/Surgical History: SURGERIES-IRIS AND BSO FINDINGS: Lower Chest: Bibasilar dependent atelectasis. Partially visualized enlarged subcarinal left hilar lymph nodes. Multiple mildly prominent pericardial lymph nodes. Enlarged distal paraesophageal lymph nodes.  Organs: Innumerable hypodense lesions in the liver consistent with metastatic disease. 1.2 cm well-circumscribed hypodense lesion along the inferior gallbladder fossa in the right hepatic lobe which is consistent with simple cyst.  Diffusely nodular hepatic contour. Spleen, pancreas, adrenal glands and left kidney appear unremarkable. 2 non obstructing calculi lower pole right kidney both of which measure approximately 5 mm. GI/Bowel: No bowel obstruction or inflammation. Normal appendix. Moderate size hiatal hernia. Pelvis: Residual contrast in the bladder from recent contrast-enhanced chest CT examination. Hysterectomy. Peritoneum/Retroperitoneum: Upper abdominal lymphadenopathy most prominent in the portal caval and gastrohepatic regions including areas of conglomerate bulky adenopathy measuring up to approximately 2 cm in short axis. Normal caliber abdominal aorta with moderate infrarenal atherosclerosis no abdominal ascites or free air. Bones/Soft Tissues: Hypertrophic degenerative change in the visualized thoracic spine. No suspicious osseous lesion or acute osseous abnormality. 1. Innumerable hypodense lesions in the liver consistent with metastatic disease. 2. Upper abdominal lymphadenopathy consistent with metastatic disease as described. 3. Partially visualized enlarged subcarinal left hilar lymph nodes. Multiple mildly prominent pericardial lymph nodes. Enlarged distal paraesophageal lymph nodes. Findings consistent with metastatic disease please see recent chest CT for further detail. 4. Diffusely nodular hepatic contour suggesting cirrhosis. 5. Moderate size hiatal hernia. 6. Right renal lower pole nephrolithiasis. 7. Few additional incidental/chronic findings as detailed above.      Fl Ercp Biliary And Pancreatic S&i    Result Date: 10/22/2019  EXAMINATION: 9 SPOT IMAGES FROM AN ERCP COMPARISON: None FLUOROSCOPY DOSE OR TIME/IMAGES: Fluoroscopy time- 520 seconds Air Kerma-409.73 mGy HISTORY: ORDERING SYSTEM PROVIDED 18 seconds; D  cGy cm2 TECHNIQUE: This procedure was performed by Dr. Layton Bradley. Informed consent was obtained after a detailed explanation of the procedure including risks, benefits, and alternatives. All elements of maximal sterile barrier technique were utilized. Initial ultrasound evaluation shows the right internal jugular vein to be patent and accessible. Static image was obtained for the patient's medical record. Right neck and chest were prepped and draped in standard sterile fashion. The patient was pretreated with intravenous antibiotics. 1% lidocaine and lidocaine with epinephrine used for local anesthesia. Using realtime ultrasound guidance the right internal jugular vein was accessed with a micropuncture needle and guidewire advanced centrally. Transitional dilator was placed which allowed placement of an 0.035 guidewire which had its tip directed under fluoroscopy to the inferior vena cava. Peel-away sheath was placed over the guidewire. 8 Russian catheter was placed over the guidewire through the peel-away sheath. The guidewire and sheath were subsequently removed. A 1 inch transverse incision was made in the right infraclavicular region and a subcutaneous pocket created using sharp and blunt dissection. The pocket is shown to be clean and dry after irrigating with antibiotic solution. The catheter was pulled from the venotomy site to the pocket through a subcutaneous tunnel. Under fluoroscopy the catheter was trimmed to the appropriate length such that the catheter tip is in the proximal right atrium. The catheter is connected to the port and the system is shown to be patent without evidence of leak. The port was flushed with heparin lock solution. Port was placed into the pocket and anchored with 2-0 Vicryl suture. The incision was closed with 2-0 Vicryl suture for the deep tissues and 4-0 Vicryl suture in a running subcuticular fashion for the superficial tissues.  Dermaflex was applied. There are no immediate complications. The patient left the department in stable condition. FINDINGS: Fluoroscopic image demonstrates the tip of the port at the right atrium. Xcela titanium power injectable port with 8 Khmer catheter was placed. Successful ultrasound and fluoroscopy guided Port-A-Cath placement     Mri Abdomen Wo Contrast Mrcp    Result Date: 10/21/2019  EXAMINATION: MRI OF THE ABDOMEN WITHOUT CONTRAST AND MRCP 10/21/2019 12:06 pm TECHNIQUE: Multiplanar multisequence MRI of the abdomen was performed without the administration of intravenous contrast.  After initial T2 axial and coronal images, thick slab, thin slab and 3D coronal MRCP sequences were obtained without the administration of intravenous contrast.  MIP images are provided for review. COMPARISON: CT abdomen 09/28/2019 and ultrasound liver 10/19/2019 HISTORY: ORDERING SYSTEM PROVIDED HISTORY: ABNORMAL LIVER FUNCTION TESTS TECHNOLOGIST PROVIDED HISTORY: Reason for Exam: Abnormal liver function tests Acuity: Acute Type of Exam: Initial Additional signs and symptoms: Per patient, Stage 4 Lung CA. Patient arrives to MRI on 6L - O2. Relevant Medical/Surgical History: Surgical hx - Hysterectomy, total abdominal. Hx - Stage 4 Lung CA  with Mets to liver and Hepatic cirrhosis . FINDINGS: TECHNICAL: Many of the image series are degraded by breathing motion which greatly blurs detail on some of the series and results in misregistration. LIVER: As described on prior CT abdomen and on prior ultrasound, the liver is essentially replaced with metastatic disease. The liver is enlarged as result, long dimension nearly 24 cm. A simple appearing 13 mm cyst is seen right hepatic lobe adjacent to the gallbladder. GALLBLADDER: Unremarkable appearance. No wall thickening or evidence of cholelithiasis. OTHER ORGANS: The spleen and adrenal glands appear unremarkable. Pancreas and kidneys appear unremarkable.  DUCTS: The common bile duct measures 4 mm. The pancreatic duct measures 2-3 mm. PERITONEUM/RETROPERITONEUM: Unremarkable appearance of the aorta aorta. No aneurysm. Unremarkable appearance of the IVC. Adenopathy predominating at the milady hepatis and portacaval regions is not nearly as well defined as on recent CT.  1 of these lesions further inferior at the portacaval region axial series 15, image 134 is 20 x 40 mm, previously 21 x 41 mm. Other adenopathy small more superior to this is simply not well enough defined for measurement because technical limitations of the exam. BONES/SOFT TISSUES: Unremarkable appearance. LOWER CHEST: Visualized portions of the lungs are clear. Cardiac and posterior mediastinal structures visualized are unremarkable. 1. Many of the image series are degraded by breathing motion which greatly blurs detail on some of the series and results in misregistration. 2. The liver is essentially replaced with what appear to be metastatic lesions. Tissue sampling correlation may be indicated dependent on other clinical factors. 3. Hepatic steatosis. 4. Though significantly limited because of technique, adenopathy in the upper abdomen is evident. The 1 discrete lymph node well-defined enough for measurement is unchanged from prior exam.     Ct Needle Biopsy Lung Percutaneous    Result Date: 9/30/2019  PROCEDURE: CT NEEDLE BIOPSY LUNG PERCUTANEOUS 9/30/2019 HISTORY: ORDERING SYSTEM PROVIDED HISTORY: left upper lobe lung mass; discussed with Dr. Duff Innocent TECHNOLOGIST PROVIDED HISTORY: left upper lobe lung mass; discussed with Dr. Araceli Bell: Dose modulation, iterative reconstruction, and/or weight based adjustment of the mA/kV was utilized to reduce the radiation dose to as low as reasonably achievable. CONTRAST: None SEDATION: None DESCRIPTION OF PROCEDURE: Informed consent was obtained after a detailed explanation of the procedure including risks, benefits, and alternatives. Universal protocol was observed.  Patient was by Usman Archuleta RVT(Sonographer) on  10/18/2019 08:59 AM  ----------------------------------------------------------------   ----------------------------------------------------------------  Electronically signed by Junior Potter(Interpreting  physician) on 10/18/2019 07:05 PM  ----------------------------------------------------------------  Findings:   Right Impression:                    Left Impression:  The common femoral, femoral,         The common femoral, femoral,  popliteal and tibial veins           popliteal and tibial veins  demonstrate normal compressibility   demonstrate normal compressibility  and augmentation. and augmentation. Normal compressibility of the great  Normal compressibility of the great  saphenous vein. saphenous vein. Normal compressibility of the small  Normal compressibility of the small  saphenous vein. saphenous vein. Velocities are measured in cm/s ; Diameters are measured in cm Right Lower Extremities DVT Study Measurements Right 2D Measurements +------------------------------------+----------+---------------+----------+ ! Location                            ! Visualized! Compressibility! Thrombosis! +------------------------------------+----------+---------------+----------+ ! Common Femoral                      !Yes       ! Yes            ! None      ! +------------------------------------+----------+---------------+----------+ ! Prox Femoral                        !Yes       ! Yes            ! None      ! +------------------------------------+----------+---------------+----------+ ! Mid Femoral                         !Yes       ! Yes            ! None      ! +------------------------------------+----------+---------------+----------+ ! Dist Femoral                        !Yes       ! Yes            ! None      ! +------------------------------------+----------+---------------+----------+ ! Deep Femoral Type of Exam: Ongoing FINDINGS: INTRACRANIAL STRUCTURES/VENTRICLES:  There is no acute infarct. No mass effect or midline shift. No evidence of an acute intracranial hemorrhage. The ventricles and sulci are normal in size and configuration for the patient's age. Mild periventricular subcortical T2 prolongation identified suggestive chronic microvascular disease. .  The sellar/suprasellar regions appear unremarkable. The normal signal voids within the major intracranial vessels appear maintained. No abnormal focus of enhancement is seen within the brain. ORBITS: The visualized portion of the orbits demonstrate no acute abnormality. SINUSES: The visualized paranasal sinuses and mastoid air cells are well aerated. BONES/SOFT TISSUES: Multiple lesions identified involving the calvarium and upper cervical spine most suggestive of osseous metastatic disease. Scattered calvarial and upper cervical spine osseous lesions suggestive of diffuse osseous metastases. Otherwise no evidence of intracranial metastatic disease. Mild chronic small vessel ischemic disease. Consultations:    Consults:     Final Specialist Recommendations/Findings:   PHARMACY TO DOSE VANCOMYCIN  IP CONSULT TO GI  IP CONSULT TO HEM/ONC  IP CONSULT TO PULMONOLOGY  IP CONSULT TO PALLIATIVE CARE  IP CONSULT TO HOSPICE      The patient was seen and examined on day of discharge and this discharge summary is in conjunction with any daily progress note from day of discharge. Discharge plan:     Disposition: Home with hospice    Physician Follow Up:   With PCP and as specified     Requiring Further Evaluation/Follow Up POST HOSPITALIZATION/Incidental Findings:    Diet: regular     Activity: As tolerated    I  Discharge Medications:      Medication List      START taking these medications    ipratropium-albuterol 0.5-2.5 (3) MG/3ML Soln nebulizer solution  Commonly known as:  DUONEB  Inhale 3 mLs into the lungs every 4 hours (while awake) lidocaine-prilocaine 2.5-2.5 % cream  Commonly known as:  EMLA  Apply topically as needed. Apply a quarter size amount to port site 1 hour before chemotherapy. Cover with plastic wrap. CHANGE how you take these medications    ondansetron 8 MG Tbdp disintegrating tablet  Commonly known as:  ZOFRAN-ODT  Take 1 tablet by mouth 3 times daily as needed for Nausea or Vomiting  What changed:    · medication strength  · how much to take  · when to take this  · reasons to take this        CONTINUE taking these medications    albuterol sulfate  (90 Base) MCG/ACT inhaler  Inhale 2 puffs into the lungs every 6 hours as needed for Wheezing     busPIRone 10 MG tablet  Commonly known as:  BUSPAR  Take 1 tablet by mouth 3 times daily     famotidine 20 MG tablet  Commonly known as:  PEPCID  TAKE 1 TABLET BY MOUTH TWICE DAILY     Home Style Bed Rails Misc  1 Units by Does not apply route as needed (as needed)     nicotine 21 MG/24HR  Commonly known as:  NICODERM CQ  Place 1 patch onto the skin daily     QUEtiapine 100 MG tablet  Commonly known as:  SEROQUEL     sodium chloride 1 g tablet  Take 1 tablet by mouth 3 times daily (with meals)     traMADol 50 MG tablet  Commonly known as:  ULTRAM  Take 1 tablet by mouth every 4 hours as needed for Pain for up to 3 days. Intended supply: 3 days.  Take lowest dose possible to manage pain        STOP taking these medications    ipratropium 0.02 % nebulizer solution  Commonly known as:  ATROVENT     MULTIVITAMIN ADULT PO     simvastatin 40 MG tablet  Commonly known as:  ZOCOR           Where to Get Your Medications      These medications were sent to [facility] [phone] - F [phone]  [address] [place] [address]    Phone:  [phone]   · lidocaine-prilocaine 2.5-2.5 % cream  · ondansetron 8 MG Tbdp disintegrating tablet     You can get these medications from any pharmacy    Bring a paper prescription for each of these medications  · traMADol 50 MG tablet     Information about where to get these medications is not yet available    Ask your nurse or doctor about these medications  · ipratropium-albuterol 0.5-2.5 (3) MG/3ML Soln nebulizer solution           Time spent on discharge planning ;          [] less than 30 minutes . [x]   more  than 30 minutes . Ellectronically signed by   Denilson Armendariz MD      Thank you Dr. Ryan Romero MD for the opportunity to be involved in this patient's care. Please note that this chart was generated using voice recognition Dragon dictation software. Although every effort was made to ensure the accuracy of this automated transcription, some errors in transcription may have occurred. Attestation and add on       I have discussed the care of Charley Biswas , including pertinent history and exam findings,    10/23/19   with the resident. I have seen and examined the patient and the key elements of all parts of the encounter have been performed by me . I agree with the assessment, plan and orders as documented by the resident. Principal Problem:    Hepatic cirrhosis ([facility] [address]  Active Problems:    Pneumonia    Elevated LFTs  Resolved Problems:    * No resolved hospital problems. *        ---       Medications: Allergies:     Allergies   Allergen Reactions    Vesicare [Solifenacin] Nausea And Vomiting       Current Meds:   Scheduled Meds:    metoprolol  2.5 mg Intravenous Q6H    vancomycin  1,500 mg Intravenous Q24H    famotidine  20 mg Oral Daily    methylPREDNISolone  30 mg Intravenous Q8H    vancomycin (VANCOCIN) intermittent dosing (placeholder)   Other RX Placeholder    busPIRone  10 mg Oral TID    QUEtiapine  100 mg Oral Nightly    simvastatin  40 mg Oral Nightly    sodium chloride  1 g Oral TID WC    nicotine  1 patch Transdermal Daily    piperacillin-tazobactam (ZOSYN) 3.375 g in dextrose 5% IVPB extended infusion

## 2019-10-23 NOTE — CONSULTS
..    Palliative Care Inpatient Consult    NAME:  Kimberli Cuevas  MEDICAL RECORD NUMBER:  [id number]  AGE: 59 y.o. GENDER: female  : 1955  TODAY'S DATE:  10/23/2019    Reasons for Consultation:    Provision of information regarding PC and/or hospice philosophies  Complex, time-intensive communication and interdisciplinary psychosocial support  Clarification of goals of care and/or assistance with difficult decision-making  Guidance in regards to resources and transition(s)    Members of PC team contributing to this consultation are :  Tj Faulkner RN    History of Present Illness     The patient is a 59 y.o. Non-/non  female who presents with No chief complaint on file. Referred to Palliative Care by   [x] Physician   [] Nursing  [] Family Request   [] Other:       She was admitted to the primary service for [facility] ([facility] [C34.90]  Pneumonia [J18.9]. Her hospital course has been associated with Hepatic cirrhosis ([facility] [address] The patient has a complicated medical history and has been hospitalized since 10/17/2019  7:58 PM.    Active Hospital Problems    Diagnosis Date Noted    Elevated LFTs [R94.5]     Hepatic cirrhosis (HCC) [K74.60]     Pneumonia [J18.9] 10/18/2019       Data        Code Status: DNR-CC     ADVANCED CARE PLANNING:  Patient has capacity for medical decisions: no  Health Care Power of : no  Living Will: no     Personal, Social, and Family History  Marital Status: single  Living situation:with family:  sister  Anabella/Spiritual History:   Not asked  Psychological Distress: moderate  Does patient understand diagnosis/treatment? somewhat  Does family/caregiver understand diagnosis/treatment?  yes    Assessment        Palliative Performance Scale:    ___100% Full ambulation; normal activity and work; no evidence of disease; able to do own self care; normal intake; fully conscious  ___90% Full ambulation; normal activity and work; some evidence of disease; able bedside. We had a lengthy discussion about patient's condition and her fairly new diagnosis of cancer just 3 weeks ago. Shruti Son states that another sister  5 months ago of the same exact disease. Family is understanding of patient's illness and we discussed options such as hospice. Family is agreeable to meet with hospice today. They request [facility] [address] [place] [address]. Meeting arranged for 1pm today with [facility] [address] [place] [address]. Nurses updated. Emotional support offered to family. CODE STATUS: Pt is a DNRCC. Education/support to family  Education/support to patient  Discharge planning/helping to coordinate care  Communications with primary service  Providing support for coping/adaptation/distress of family  Providing support for coping/adaptation/distress of patient  Discussing meaning/purpose   Continue with current plan of care  Clarification of medical condition to patient and family  Code status clarified: [facility]  Provided information about hospice  Family meeting with [facility] [address] [place] [address] today at 1pm. Will follow for support. Principle Problem/Diagnosis:  Small cell lung cancer ([facility] [address] [C34.90]  Pneumonia [J18.9]    Goals of care evaluation:  The patient goals of care are provide comfort care/support/palliation/relieve suffering   Goals of care discussed with:    [] Patient independently    [x] Patient and Family    [] Family or Healthcare DPOA independently    [] Unable to discuss with patient, family/DPOA not present    Code Status  DNR-CC    Other recommendations:  Please call with any palliative questions or concerns. Palliative Care Team is available via perfect serve or via phone - [phone]. Palliative Care will continue to follow Ms. Post's care as needed. Thank you for allowing Palliative Care to participate in the care of Ms. Post .     Electronically signed by   Krystal Allen RN  Palliative Care Team  on 10/23/2019 at 11:13 AM    Palliative care office: [phone]

## 2019-10-23 NOTE — PROGRESS NOTES
Pulmonary Progress Note  [facility]ists      Patient - Sheri Boo,  Age - 59 y.o.    - 1955      Room Number -    MRN -  [id number]   Acct # - [de-identified]  Date of Admission -  10/17/2019  7:58 PM    Consulting Gab Russo MD  Primary Care Physician - Nafisa Moon MD     SUBJECTIVE   Patient looks ill. She does not look like she is in distress    OBJECTIVE   VITALS    height is 5' 1\" (1.549 m) and weight is 206 lb 2.1 oz (93.5 kg). Her oral temperature is 99.1 °F (37.3 °C). Her blood pressure is 117/74 and her pulse is 108. Her respiration is 13 and oxygen saturation is 92%. Body mass index is 38.95 kg/m². Temperature Range: Temp: 99.1 °F (37.3 °C) Temp  Av °F (37.2 °C)  Min: 97.8 °F (36.6 °C)  Max: 101.3 °F (38.5 °C)  BP Range:  Systolic (53WRZ), CRISTOPHER:030 , Min:107 , RNK:614     Diastolic (55XYM), IWI:10, Min:50, Max:96    Pulse Range: Pulse  Av.4  Min: 108  Max: 135  Respiration Range: Resp  Av.8  Min: 13  Max: 26  Current Pulse Ox[de-identified]  SpO2: 92 %  24HR Pulse Ox Range:  SpO2  Av.7 %  Min: 86 %  Max: 96 %  Oxygen Amount and Delivery: O2 Flow Rate (L/min): 5.5 L/min    Wt Readings from Last 3 Encounters:   10/21/19 206 lb 2.1 oz (93.5 kg)   10/17/19 193 lb (87.5 kg)   10/10/19 186 lb (84.4 kg)       I/O (24 Hours)    Intake/Output Summary (Last 24 hours) at 10/23/2019 0902  Last data filed at 10/23/2019 0515  Gross per 24 hour   Intake 3779 ml   Output 150 ml   Net 3629 ml       EXAM     General Appearance  Awake, alert, oriented, in no acute distress  HEENT - normocephalic, atraumatic. Neck - Supple,  trachea midline   Lungs -coarse breath sounds no crackles rales or wheezes  Heart Exam:PMI normal. No lifts, heaves, or thrills. RRR. No murmurs, clicks, gallops, or rubs  Abdomen Exam: Abdomen soft, non-tender.  BS normal.  Extremity Exam: Edema present    MEDS      metoprolol  2.5 mg Intravenous Q6H    Vitamin D deficiency    Tobacco abuse    Precordial pain    Adverse drug effect    Idiopathic hypotension    Sweating abnormality    Mild cognitive disorder    Idiopathic hypotension    Contusion of left back wall of thorax    Pain at rest    Inability to ambulate due to knee    Fall with injury    Bipolar disorder in full remission ([facility] [address] [facility]    Acute cystitis without hematuria    Left elbow contusion    Mobility impaired    Tibial plateau fracture, left    Chronic pain of left knee    Obesity (BMI 35.0-39.9 without comorbidity)    Hyponatremia    Small cell lung cancer (HCC)    Pneumonia    Elevated LFTs    Hepatic cirrhosis (HCC)     Patient's prognosis very poor. I have been told that hospice has been requested to see patient as well as positive care.     Please call if we can be of further assistance      Electronically signed by Reynold Torres MD on 10/23/2019 at 9:02 AM

## 2019-10-24 ENCOUNTER — CARE COORDINATION (OUTPATIENT)
Dept: CARE COORDINATION | Age: 64
End: 2019-10-24

## 2019-10-24 ENCOUNTER — TELEPHONE (OUTPATIENT)
Dept: INTERNAL MEDICINE CLINIC | Age: 64
End: 2019-10-24

## 2019-10-27 LAB
CULTURE: NORMAL
CULTURE: NORMAL
Lab: NORMAL
Lab: NORMAL
SPECIMEN DESCRIPTION: NORMAL
SPECIMEN DESCRIPTION: NORMAL

## 2024-04-08 NOTE — PLAN OF CARE
Caller: Sindy Olvera    Relationship: Emergency Contact    Best call back number: [phone]    What is the best time to reach you: ANYTIME     Who are you requesting to speak with (clinical staff, provider,  specific staff member): CLINICAL STAFF    What was the call regarding: PATIENT'S WIFE IS CALLING TO SEE IF THE CLINICAL STAFF THINKS IF THE CALCIUM SCORE HEART TEST WOULD BE BENEFICIAL FOR THE PATIENT     Is it okay if the provider responds through MyChart: NO     Mri results discussed with Dr Nicole Streeter will plan on ercp tomorrow . Cora Lilly, APRN - CNP

## (undated) DEVICE — ERBE NESSY® OMEGA PLATE USA (85+23)CM² , WITH CABLE 3 M: Brand: ERBE

## (undated) DEVICE — SPHINCTEROTOME: Brand: DREAMTOME™ RX 44

## (undated) DEVICE — STRAP,POSITIONING,KNEE/BODY,FOAM,4X60": Brand: MEDLINE

## (undated) DEVICE — SYSTEM BX CAP BILI RAP EXCHG CAP LOK DEV COMPATIBLE W/ OLY

## (undated) DEVICE — GOWN,POLY REINFORCED,LG: Brand: MEDLINE

## (undated) DEVICE — GUIDEWIRE ENDOSCP STR 0.035 INX260 CM STIFF RND DREAMWIRE ST

## (undated) DEVICE — 3 ML SYRINGE LUER-LOCK TIP: Brand: MONOJECT

## (undated) DEVICE — SYRINGE, LUER LOCK, 10ML: Brand: MEDLINE

## (undated) DEVICE — DEFENDO AIR WATER SUCTION AND BIOPSY VALVE KIT FOR  OLYMPUS: Brand: DEFENDO AIR/WATER/SUCTION AND BIOPSY VALVE

## (undated) DEVICE — JELLY,LUBE,STERILE,FLIP TOP,TUBE,2-OZ: Brand: MEDLINE

## (undated) DEVICE — BITEBLOCK 54FR W/ DENT RIM BLOX